# Patient Record
Sex: FEMALE | Race: WHITE | NOT HISPANIC OR LATINO | Employment: FULL TIME | URBAN - METROPOLITAN AREA
[De-identification: names, ages, dates, MRNs, and addresses within clinical notes are randomized per-mention and may not be internally consistent; named-entity substitution may affect disease eponyms.]

---

## 2019-08-08 ENCOUNTER — OFFICE VISIT (OUTPATIENT)
Dept: FAMILY MEDICINE | Facility: CLINIC | Age: 59
End: 2019-08-08
Payer: COMMERCIAL

## 2019-08-08 VITALS
DIASTOLIC BLOOD PRESSURE: 78 MMHG | TEMPERATURE: 98 F | WEIGHT: 211.63 LBS | BODY MASS INDEX: 35.26 KG/M2 | HEIGHT: 65 IN | SYSTOLIC BLOOD PRESSURE: 126 MMHG | HEART RATE: 81 BPM | OXYGEN SATURATION: 96 %

## 2019-08-08 DIAGNOSIS — Z86.19 HISTORY OF HEPATITIS C: ICD-10-CM

## 2019-08-08 DIAGNOSIS — K57.92 DIVERTICULITIS: Primary | ICD-10-CM

## 2019-08-08 PROCEDURE — 99999 PR PBB SHADOW E&M-NEW PATIENT-LVL III: ICD-10-PCS | Mod: PBBFAC,,, | Performed by: FAMILY MEDICINE

## 2019-08-08 PROCEDURE — 99204 OFFICE O/P NEW MOD 45 MIN: CPT | Mod: S$GLB,,, | Performed by: FAMILY MEDICINE

## 2019-08-08 PROCEDURE — 99204 PR OFFICE/OUTPT VISIT, NEW, LEVL IV, 45-59 MIN: ICD-10-PCS | Mod: S$GLB,,, | Performed by: FAMILY MEDICINE

## 2019-08-08 PROCEDURE — 99999 PR PBB SHADOW E&M-NEW PATIENT-LVL III: CPT | Mod: PBBFAC,,, | Performed by: FAMILY MEDICINE

## 2019-08-08 RX ORDER — METRONIDAZOLE 500 MG/1
500 TABLET ORAL 3 TIMES DAILY
Qty: 21 TABLET | Refills: 0 | Status: SHIPPED | OUTPATIENT
Start: 2019-08-08 | End: 2020-02-03 | Stop reason: ALTCHOICE

## 2019-08-08 RX ORDER — CIPROFLOXACIN 500 MG/1
500 TABLET ORAL EVERY 12 HOURS
Qty: 14 TABLET | Refills: 0 | Status: SHIPPED | OUTPATIENT
Start: 2019-08-08 | End: 2020-02-03 | Stop reason: ALTCHOICE

## 2019-08-08 NOTE — LETTER
August 8, 2019      [facility]  [address]  [facility], [place] [address]  Phone: [phone]  Fax: [phone]       Patient: Sharri House   YOB: 1960  Date of Visit: 08/08/2019    To Whom It May Concern:    Jenifer House  was at [facility] on 08/08/2019. She may return to work on 8/12/19 with no restrictions. If you have any questions or concerns, or if I can be of further assistance, please do not hesitate to contact me.    Sincerely,    Rajani Bowens MA
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
## 2020-02-03 ENCOUNTER — OFFICE VISIT (OUTPATIENT)
Dept: FAMILY MEDICINE | Facility: CLINIC | Age: 60
End: 2020-02-03
Payer: COMMERCIAL

## 2020-02-03 ENCOUNTER — HOSPITAL ENCOUNTER (OUTPATIENT)
Facility: HOSPITAL | Age: 60
Discharge: HOME | End: 2020-02-04
Attending: EMERGENCY MEDICINE | Admitting: FAMILY MEDICINE
Payer: COMMERCIAL

## 2020-02-03 VITALS
HEIGHT: 65 IN | DIASTOLIC BLOOD PRESSURE: 86 MMHG | TEMPERATURE: 97 F | HEART RATE: 81 BPM | WEIGHT: 227.5 LBS | BODY MASS INDEX: 37.9 KG/M2 | SYSTOLIC BLOOD PRESSURE: 124 MMHG | OXYGEN SATURATION: 99 %

## 2020-02-03 DIAGNOSIS — R03.0 ELEVATED BLOOD PRESSURE READING WITHOUT DIAGNOSIS OF HYPERTENSION: ICD-10-CM

## 2020-02-03 DIAGNOSIS — Z72.0 TOBACCO ABUSE DISORDER: ICD-10-CM

## 2020-02-03 DIAGNOSIS — R07.9 CHEST PAIN WITH MODERATE RISK OF ACUTE CORONARY SYNDROME: Primary | ICD-10-CM

## 2020-02-03 DIAGNOSIS — F17.218 CIGARETTE NICOTINE DEPENDENCE WITH OTHER NICOTINE-INDUCED DISORDER: ICD-10-CM

## 2020-02-03 DIAGNOSIS — R07.9 CHEST PAIN: ICD-10-CM

## 2020-02-03 DIAGNOSIS — R07.9 CHEST PAIN, UNSPECIFIED TYPE: Primary | ICD-10-CM

## 2020-02-03 LAB
ALBUMIN SERPL BCP-MCNC: 3.8 G/DL (ref 3.5–5.2)
ALP SERPL-CCNC: 68 U/L (ref 55–135)
ALT SERPL W/O P-5'-P-CCNC: 28 U/L (ref 10–44)
ANION GAP SERPL CALC-SCNC: 12 MMOL/L (ref 8–16)
AST SERPL-CCNC: 23 U/L (ref 10–40)
BASOPHILS # BLD AUTO: 0.05 K/UL (ref 0–0.2)
BASOPHILS NFR BLD: 1 % (ref 0–1.9)
BILIRUB SERPL-MCNC: 0.5 MG/DL (ref 0.1–1)
BILIRUB UR QL STRIP: NEGATIVE
BNP SERPL-MCNC: 14 PG/ML (ref 0–99)
BUN SERPL-MCNC: 14 MG/DL (ref 6–20)
CALCIUM SERPL-MCNC: 9.5 MG/DL (ref 8.7–10.5)
CHLORIDE SERPL-SCNC: 104 MMOL/L (ref 95–110)
CK SERPL-CCNC: 112 U/L (ref 20–180)
CLARITY UR: CLEAR
CO2 SERPL-SCNC: 22 MMOL/L (ref 23–29)
COLOR UR: YELLOW
CREAT SERPL-MCNC: 0.8 MG/DL (ref 0.5–1.4)
DIFFERENTIAL METHOD: NORMAL
EOSINOPHIL # BLD AUTO: 0.2 K/UL (ref 0–0.5)
EOSINOPHIL NFR BLD: 3.1 % (ref 0–8)
ERYTHROCYTE [DISTWIDTH] IN BLOOD BY AUTOMATED COUNT: 12.4 % (ref 11.5–14.5)
EST. GFR  (AFRICAN AMERICAN): >60 ML/MIN/1.73 M^2
EST. GFR  (NON AFRICAN AMERICAN): >60 ML/MIN/1.73 M^2
GLUCOSE SERPL-MCNC: 97 MG/DL (ref 70–110)
GLUCOSE UR QL STRIP: NEGATIVE
HCT VFR BLD AUTO: 42.1 % (ref 37–48.5)
HGB BLD-MCNC: 14 G/DL (ref 12–16)
HGB UR QL STRIP: ABNORMAL
IMM GRANULOCYTES # BLD AUTO: 0.01 K/UL (ref 0–0.04)
IMM GRANULOCYTES NFR BLD AUTO: 0.2 % (ref 0–0.5)
KETONES UR QL STRIP: ABNORMAL
LEUKOCYTE ESTERASE UR QL STRIP: NEGATIVE
LYMPHOCYTES # BLD AUTO: 1.7 K/UL (ref 1–4.8)
LYMPHOCYTES NFR BLD: 33.9 % (ref 18–48)
MCH RBC QN AUTO: 28.4 PG (ref 27–31)
MCHC RBC AUTO-ENTMCNC: 33.3 G/DL (ref 32–36)
MCV RBC AUTO: 85 FL (ref 82–98)
MONOCYTES # BLD AUTO: 0.5 K/UL (ref 0.3–1)
MONOCYTES NFR BLD: 10.8 % (ref 4–15)
NEUTROPHILS # BLD AUTO: 2.5 K/UL (ref 1.8–7.7)
NEUTROPHILS NFR BLD: 51 % (ref 38–73)
NITRITE UR QL STRIP: NEGATIVE
NRBC BLD-RTO: 0 /100 WBC
PH UR STRIP: 5 [PH] (ref 5–8)
PLATELET # BLD AUTO: 252 K/UL (ref 150–350)
PMV BLD AUTO: 10.2 FL (ref 9.2–12.9)
POTASSIUM SERPL-SCNC: 4.1 MMOL/L (ref 3.5–5.1)
PROT SERPL-MCNC: 7.2 G/DL (ref 6–8.4)
PROT UR QL STRIP: NEGATIVE
RBC # BLD AUTO: 4.93 M/UL (ref 4–5.4)
SODIUM SERPL-SCNC: 138 MMOL/L (ref 136–145)
SP GR UR STRIP: >=1.03 (ref 1–1.03)
TROPONIN I SERPL DL<=0.01 NG/ML-MCNC: <0.006 NG/ML (ref 0–0.03)
URN SPEC COLLECT METH UR: ABNORMAL
UROBILINOGEN UR STRIP-ACNC: NEGATIVE EU/DL
WBC # BLD AUTO: 4.89 K/UL (ref 3.9–12.7)

## 2020-02-03 PROCEDURE — 99285 EMERGENCY DEPT VISIT HI MDM: CPT | Mod: 25

## 2020-02-03 PROCEDURE — 25000003 PHARM REV CODE 250: Performed by: EMERGENCY MEDICINE

## 2020-02-03 PROCEDURE — 84484 ASSAY OF TROPONIN QUANT: CPT

## 2020-02-03 PROCEDURE — 83880 ASSAY OF NATRIURETIC PEPTIDE: CPT

## 2020-02-03 PROCEDURE — 36415 COLL VENOUS BLD VENIPUNCTURE: CPT

## 2020-02-03 PROCEDURE — 99214 PR OFFICE/OUTPT VISIT, EST, LEVL IV, 30-39 MIN: ICD-10-PCS | Mod: S$GLB,,, | Performed by: FAMILY MEDICINE

## 2020-02-03 PROCEDURE — 99214 OFFICE O/P EST MOD 30 MIN: CPT | Mod: S$GLB,,, | Performed by: FAMILY MEDICINE

## 2020-02-03 PROCEDURE — 80053 COMPREHEN METABOLIC PANEL: CPT

## 2020-02-03 PROCEDURE — G0378 HOSPITAL OBSERVATION PER HR: HCPCS

## 2020-02-03 PROCEDURE — 93005 ELECTROCARDIOGRAM TRACING: CPT

## 2020-02-03 PROCEDURE — 84484 ASSAY OF TROPONIN QUANT: CPT | Mod: 91

## 2020-02-03 PROCEDURE — 81003 URINALYSIS AUTO W/O SCOPE: CPT

## 2020-02-03 PROCEDURE — 85025 COMPLETE CBC W/AUTO DIFF WBC: CPT

## 2020-02-03 PROCEDURE — 99999 PR PBB SHADOW E&M-EST. PATIENT-LVL III: ICD-10-PCS | Mod: PBBFAC,,, | Performed by: FAMILY MEDICINE

## 2020-02-03 PROCEDURE — 82550 ASSAY OF CK (CPK): CPT

## 2020-02-03 PROCEDURE — 99999 PR PBB SHADOW E&M-EST. PATIENT-LVL III: CPT | Mod: PBBFAC,,, | Performed by: FAMILY MEDICINE

## 2020-02-03 PROCEDURE — 93010 EKG 12-LEAD: ICD-10-PCS | Mod: ,,, | Performed by: INTERNAL MEDICINE

## 2020-02-03 PROCEDURE — 93010 ELECTROCARDIOGRAM REPORT: CPT | Mod: ,,, | Performed by: INTERNAL MEDICINE

## 2020-02-03 RX ORDER — ONDANSETRON 2 MG/ML
4 INJECTION INTRAMUSCULAR; INTRAVENOUS EVERY 6 HOURS PRN
Status: DISCONTINUED | OUTPATIENT
Start: 2020-02-04 | End: 2020-02-04 | Stop reason: HOSPADM

## 2020-02-03 RX ORDER — ASPIRIN 325 MG
325 TABLET ORAL
Status: COMPLETED | OUTPATIENT
Start: 2020-02-03 | End: 2020-02-03

## 2020-02-03 RX ORDER — NITROGLYCERIN 0.4 MG/1
0.4 TABLET SUBLINGUAL EVERY 5 MIN PRN
Status: DISCONTINUED | OUTPATIENT
Start: 2020-02-04 | End: 2020-02-04 | Stop reason: HOSPADM

## 2020-02-03 RX ORDER — ACETAMINOPHEN 325 MG/1
650 TABLET ORAL EVERY 6 HOURS PRN
Status: DISCONTINUED | OUTPATIENT
Start: 2020-02-04 | End: 2020-02-04 | Stop reason: HOSPADM

## 2020-02-03 RX ORDER — ENOXAPARIN SODIUM 100 MG/ML
40 INJECTION SUBCUTANEOUS EVERY 24 HOURS
Status: DISCONTINUED | OUTPATIENT
Start: 2020-02-04 | End: 2020-02-04 | Stop reason: HOSPADM

## 2020-02-03 RX ORDER — ASPIRIN 81 MG/1
81 TABLET ORAL DAILY
Status: DISCONTINUED | OUTPATIENT
Start: 2020-02-04 | End: 2020-02-04 | Stop reason: HOSPADM

## 2020-02-03 RX ORDER — PANTOPRAZOLE SODIUM 40 MG/1
40 TABLET, DELAYED RELEASE ORAL DAILY
Status: DISCONTINUED | OUTPATIENT
Start: 2020-02-04 | End: 2020-02-04 | Stop reason: HOSPADM

## 2020-02-03 RX ORDER — TALC
6 POWDER (GRAM) TOPICAL NIGHTLY PRN
Status: DISCONTINUED | OUTPATIENT
Start: 2020-02-04 | End: 2020-02-04 | Stop reason: HOSPADM

## 2020-02-03 RX ADMIN — ASPIRIN 325 MG: 325 TABLET ORAL at 04:02

## 2020-02-03 RX ADMIN — NITROGLYCERIN 1 INCH: 20 OINTMENT TOPICAL at 04:02

## 2020-02-03 NOTE — PROGRESS NOTES
"Subjective:       Patient ID: Sharri House is a 59 y.o. female.    Chief Complaint: Chest Pain      HPI Comments:     No current facility-administered medications for this visit.   No current outpatient medications on file.    Facility-Administered Medications Ordered in Other Visits:     aspirin tablet 325 mg, 325 mg, Oral, ED 1 Time, Robbie Mcdermott MD    nitroGLYCERIN 2% TD oint ointment 1 inch, 1 inch, Topical (Top), ED 1 Time, Robbie Mcdermott MD      Was talking on the phone about 90 min ago when she began having substernal chest pressure.  Radiate up into both jaws and ears.  Currently she is having some discomfort in her chest, can't focus.  No nausea or the vomiting.  Slight shortness of breath with no diaphoresis.  EMS was called at work and informed her that her vital signs and blood sugar were stable.  EKG was normal.  Recommended she get further evaluated with enzymes.  No history of cardiac problems.  Has had some palpitations over the last month but no increase in stress levels.  History of more heartburn now than before.  History of hiatal hernia.  Takes Tums and Nexium    Review of Systems   Constitutional: Positive for fatigue. Negative for activity change, appetite change and fever.   HENT: Negative for sore throat.    Respiratory: Positive for chest tightness and shortness of breath. Negative for cough.    Cardiovascular: Negative for chest pain.   Gastrointestinal: Negative for abdominal pain, diarrhea and nausea.   Genitourinary: Negative for difficulty urinating.   Musculoskeletal: Negative for arthralgias and myalgias.   Neurological: Negative for dizziness and headaches.   Psychiatric/Behavioral: Positive for decreased concentration.       Objective:      Vitals:    02/03/20 1507   BP: 124/86   Pulse: 81   Temp: 97 °F (36.1 °C)   TempSrc: Tympanic   SpO2: 99%   Weight: 103.2 kg (227 lb 8.2 oz)   Height: 5' 5" (1.651 m)   PainSc:   8   PainLoc: Chest     Physical Exam "   Constitutional: She is oriented to person, place, and time. She appears well-developed and well-nourished. No distress.   HENT:   Head: Normocephalic.   Mouth/Throat: No oropharyngeal exudate.   Neck: Neck supple. No thyromegaly present.   Cardiovascular: Normal rate, regular rhythm and normal heart sounds.   No murmur heard.  Pulmonary/Chest: Effort normal and breath sounds normal. She has no wheezes. She has no rales.   Abdominal: Soft. She exhibits no distension.   Musculoskeletal: She exhibits no edema.   Lymphadenopathy:     She has no cervical adenopathy.   Neurological: She is alert and oriented to person, place, and time.   Skin: Skin is warm and dry. She is not diaphoretic.   Psychiatric: Her speech is normal and behavior is normal. Judgment and thought content normal. Her mood appears anxious.   Nursing note and vitals reviewed.      Assessment:       1. Chest pain, unspecified type        Plan:   Chest pain, unspecified type  Comments:  Angina verses GERD/esophageal spasm.  Sent to ER for further evaluation.   will drive patient directly

## 2020-02-03 NOTE — ED PROVIDER NOTES
SCRIBE #1 NOTE: I, Rm Trell, am scribing for, and in the presence of, Robbie Mcdermott MD and Bonny Hu MD. I have scribed the entire note.       History     Chief Complaint   Patient presents with    Chest Pain     sent by Dr. Brandt for sub-sternal CP radiating to jaw x 2 hours; denies SOB     Review of patient's allergies indicates:  No Known Allergies      History of Present Illness     HPI    2/3/2020, 3:59 PM  History obtained from the patient      History of Present Illness: Sharri House is a 59 y.o. female patient with a history of tobacco abuse who presents to the Emergency Department for evaluation of left sided CP radiating to the jaw which onset 2 hours ago. Symptoms are constant and moderate in severity. No mitigating or exacerbating factors reported. Associated sxs include diaphoresis and nausea. Patient denies any SOB, cough, palpitations, emesis, weakness, and all other sxs at this time. No prior Tx reported. No further complaints or concerns at this time. Patient referred to ED by PCP for further eval.      Arrival mode: Personal transportation    PCP: Ar Brandt MD      Past Medical History:  Past Medical History:   Diagnosis Date    GERD (gastroesophageal reflux disease)        Past Surgical History:  Past Surgical History:   Procedure Laterality Date    ABDOMINAL SURGERY       SECTION      HIP SURGERY      HYSTERECTOMY           Family History:  History reviewed. No pertinent family history.    Social History:   Social History     Tobacco Use    Smoking status: Current Every Day Smoker     Packs/day: 0.25     Types: Cigarettes    Smokeless tobacco: Never Used   Substance and Sexual Activity    Alcohol use: Yes     Comment: occasional     Drug use: Never    Sexual activity: Unknown         Review of Systems     Review of Systems   Constitutional: Positive for diaphoresis. Negative for fever.   HENT: Negative for sore throat.    Respiratory:  "Negative for cough and shortness of breath.    Cardiovascular: Positive for chest pain. Negative for palpitations.   Gastrointestinal: Positive for nausea. Negative for vomiting.   Genitourinary: Negative for dysuria.   Musculoskeletal: Negative for back pain.   Skin: Negative for rash.   Neurological: Negative for dizziness and weakness.   Hematological: Does not bruise/bleed easily.   All other systems reviewed and are negative.       Physical Exam     Initial Vitals [02/03/20 1541]   BP Pulse Resp Temp SpO2   (!) 178/84 75 20 98.1 °F (36.7 °C) 98 %      MAP       --          Physical Exam  Nursing Notes and Vital Signs Reviewed.  Constitutional: Well-developed and well-nourished. NAD  Head: Atraumatic. Normocephalic.  Eyes: PERRL. EOM intact. Conjunctivae are not pale. No scleral icterus.  ENT: Mucous membranes are moist. Oropharynx is clear and symmetric.    Neck: Supple. Full ROM. No lymphadenopathy.  Cardiovascular: Regular rate. Regular rhythm. No murmurs, rubs, or gallops. Distal pulses are 2+ and symmetric.  Pulmonary/Chest: No respiratory distress. Clear to auscultation bilaterally. No wheezing or rales.  Abdominal: Soft and non-distended.  There is no tenderness.  No rebound, guarding, or rigidity. Good bowel sounds.  Genitourinary: No CVA tenderness  Musculoskeletal: Moves all extremities. No obvious deformities. No calf tenderness.  Skin: Warm and dry.  Neurological:  Alert, awake, and appropriate.  Normal speech.  No acute focal neurological deficits are appreciated.  Psychiatric: Normal affect. Good eye contact. Appropriate in content.     ED Course   Procedures  ED Vital Signs:  Vitals:    02/03/20 1541 02/03/20 1554 02/03/20 1600 02/03/20 1615   BP: (!) 178/84  (!) 163/78 (!) 149/71   Pulse: 75 69 70 70   Resp: 20  14 20   Temp: 98.1 °F (36.7 °C)      TempSrc: Oral      SpO2: 98%  98% 96%   Weight: 103.1 kg (227 lb 6.5 oz)      Height: 5' 5" (1.651 m)       02/03/20 1630 02/03/20 1645   BP: (!) " 156/83 (!) 157/88   Pulse: 67 70   Resp: 20 19   Temp:     TempSrc:     SpO2: 98% 96%   Weight:     Height:         Abnormal Lab Results:  Labs Reviewed   COMPREHENSIVE METABOLIC PANEL - Abnormal; Notable for the following components:       Result Value    CO2 22 (*)     All other components within normal limits   CBC W/ AUTO DIFFERENTIAL   B-TYPE NATRIURETIC PEPTIDE   CK   TROPONIN I   URINALYSIS, REFLEX TO URINE CULTURE        All Lab Results:  Results for orders placed or performed during the hospital encounter of 02/03/20   CBC auto differential   Result Value Ref Range    WBC 4.89 3.90 - 12.70 K/uL    RBC 4.93 4.00 - 5.40 M/uL    Hemoglobin 14.0 12.0 - 16.0 g/dL    Hematocrit 42.1 37.0 - 48.5 %    Mean Corpuscular Volume 85 82 - 98 fL    Mean Corpuscular Hemoglobin 28.4 27.0 - 31.0 pg    Mean Corpuscular Hemoglobin Conc 33.3 32.0 - 36.0 g/dL    RDW 12.4 11.5 - 14.5 %    Platelets 252 150 - 350 K/uL    MPV 10.2 9.2 - 12.9 fL    Immature Granulocytes 0.2 0.0 - 0.5 %    Gran # (ANC) 2.5 1.8 - 7.7 K/uL    Immature Grans (Abs) 0.01 0.00 - 0.04 K/uL    Lymph # 1.7 1.0 - 4.8 K/uL    Mono # 0.5 0.3 - 1.0 K/uL    Eos # 0.2 0.0 - 0.5 K/uL    Baso # 0.05 0.00 - 0.20 K/uL    nRBC 0 0 /100 WBC    Gran% 51.0 38.0 - 73.0 %    Lymph% 33.9 18.0 - 48.0 %    Mono% 10.8 4.0 - 15.0 %    Eosinophil% 3.1 0.0 - 8.0 %    Basophil% 1.0 0.0 - 1.9 %    Differential Method Automated    Comprehensive metabolic panel   Result Value Ref Range    Sodium 138 136 - 145 mmol/L    Potassium 4.1 3.5 - 5.1 mmol/L    Chloride 104 95 - 110 mmol/L    CO2 22 (L) 23 - 29 mmol/L    Glucose 97 70 - 110 mg/dL    BUN, Bld 14 6 - 20 mg/dL    Creatinine 0.8 0.5 - 1.4 mg/dL    Calcium 9.5 8.7 - 10.5 mg/dL    Total Protein 7.2 6.0 - 8.4 g/dL    Albumin 3.8 3.5 - 5.2 g/dL    Total Bilirubin 0.5 0.1 - 1.0 mg/dL    Alkaline Phosphatase 68 55 - 135 U/L    AST 23 10 - 40 U/L    ALT 28 10 - 44 U/L    Anion Gap 12 8 - 16 mmol/L    eGFR if African American >60 >60  mL/min/1.73 m^2    eGFR if non African American >60 >60 mL/min/1.73 m^2   Brain natriuretic peptide   Result Value Ref Range    BNP 14 0 - 99 pg/mL   CK   Result Value Ref Range     20 - 180 U/L   Troponin I   Result Value Ref Range    Troponin I <0.006 0.000 - 0.026 ng/mL         Imaging Results          X-Ray Chest PA And Lateral (Final result)  Result time 02/03/20 16:30:51    Final result by YI Dunham Sr., MD (02/03/20 16:30:51)                 Impression:      Normal study.      Electronically signed by: Bruno Dunham MD  Date:    02/03/2020  Time:    16:30             Narrative:    EXAMINATION:  XR CHEST PA AND LATERAL    CLINICAL HISTORY:  chest pain;    COMPARISON:  10/15/2009    FINDINGS:  The size and contour of the heart are normal. The lungs are clear. There is no pneumothorax or pleural effusion.                                 The EKG was ordered, reviewed, and independently interpreted by the ED provider.  Interpretation time: 1556  Rate: 68 BPM  Rhythm: NSR  Interpretation: no acute ST changes. No STEMI.      The Emergency Provider reviewed the vital signs and test results, which are outlined above.     ED Discussion     4:08 PM: Dr. Mcdermott transfers care of pt to Dr. Hu pending lab and imaging results.    5:21 PM: Discussed case with  (Hospital Medicine). Dr. Altamirano agrees with current care and management of pt and accepts admission for ACS rule out.   Admitting Service: Hospital Medicine  Admit to: obs / tele    Re-evaluated pt. I have discussed test results, shared treatment plan, and the need for admission with patient and family at bedside. Pt and family express understanding at this time and agree with all information. All questions answered. Pt and family have no further questions or concerns at this time. Pt is ready for admit.       MDM        Medical Decision Making:   Clinical Tests:   Lab Tests: Ordered and Reviewed  Radiological Study: Reviewed and Ordered  Medical  Tests: Reviewed and Ordered     Additional MDM:   Smoking Cessation: The patient is a smoker. The patient was counseled on smoking cessation for: 4 minutes. The patient was counseled on tobacco related  health complications.        ED Medication(s):  Medications   nitroGLYCERIN 2% TD oint ointment 1 inch (1 inch Topical (Top) Given 2/3/20 1632)   aspirin tablet 325 mg (325 mg Oral Given 2/3/20 1632)       New Prescriptions    No medications on file               Scribe Attestation:   Scribe #1: I performed the above scribed service and the documentation accurately describes the services I performed. I attest to the accuracy of the note.     Attending:   Physician Attestation Statement for Scribe #1: I, Robbie Mcdermott MD, personally performed the services described in this documentation, as scribed by Rm Cai, in my presence, and it is both accurate and complete.     Physician Attestation Statement for Scribe #1: I, Bonny Hu MD, personally performed the services described in this documentation, as scribed by Rm Cai, in my presence, and it is both accurate and complete.           Clinical Impression       ICD-10-CM ICD-9-CM   1. Chest pain with moderate risk of acute coronary syndrome R07.9 786.50   2. Chest pain R07.9 786.50   3. Tobacco abuse disorder Z72.0 305.1       Disposition:   Disposition: Placed in Observation  Condition: Fair         Bonny Hu MD  02/03/20 7301

## 2020-02-04 VITALS
WEIGHT: 226.19 LBS | DIASTOLIC BLOOD PRESSURE: 65 MMHG | HEART RATE: 70 BPM | RESPIRATION RATE: 18 BRPM | SYSTOLIC BLOOD PRESSURE: 129 MMHG | BODY MASS INDEX: 37.69 KG/M2 | HEIGHT: 65 IN | TEMPERATURE: 98 F | OXYGEN SATURATION: 94 %

## 2020-02-04 DIAGNOSIS — R07.9 CHEST PAIN, UNSPECIFIED TYPE: Primary | ICD-10-CM

## 2020-02-04 DIAGNOSIS — E66.01 SEVERE OBESITY (BMI 35.0-35.9 WITH COMORBIDITY): ICD-10-CM

## 2020-02-04 DIAGNOSIS — Z72.0 TOBACCO USE: ICD-10-CM

## 2020-02-04 PROBLEM — J44.9 COPD (CHRONIC OBSTRUCTIVE PULMONARY DISEASE): Status: ACTIVE | Noted: 2020-02-04

## 2020-02-04 PROBLEM — F17.210 DEPENDENCE ON NICOTINE FROM CIGARETTES: Status: ACTIVE | Noted: 2020-02-03

## 2020-02-04 PROBLEM — R03.0 ELEVATED BLOOD PRESSURE READING WITHOUT DIAGNOSIS OF HYPERTENSION: Status: RESOLVED | Noted: 2020-02-03 | Resolved: 2020-02-04

## 2020-02-04 LAB
CHOLEST SERPL-MCNC: 206 MG/DL (ref 120–199)
CHOLEST/HDLC SERPL: 4.5 {RATIO} (ref 2–5)
DIASTOLIC DYSFUNCTION: NO
ESTIMATED PA SYSTOLIC PRESSURE: 22.36
HDLC SERPL-MCNC: 46 MG/DL (ref 40–75)
HDLC SERPL: 22.3 % (ref 20–50)
LDLC SERPL CALC-MCNC: 117.2 MG/DL (ref 63–159)
NONHDLC SERPL-MCNC: 160 MG/DL
RETIRED EF AND QEF - SEE NOTES: 60 (ref 55–65)
TRIGL SERPL-MCNC: 214 MG/DL (ref 30–150)
TROPONIN I SERPL DL<=0.01 NG/ML-MCNC: <0.006 NG/ML (ref 0–0.03)
TROPONIN I SERPL DL<=0.01 NG/ML-MCNC: <0.006 NG/ML (ref 0–0.03)

## 2020-02-04 PROCEDURE — 93306 2D ECHO WITH COLOR FLOW DOPPLER: ICD-10-PCS | Mod: 26,,, | Performed by: INTERNAL MEDICINE

## 2020-02-04 PROCEDURE — 99244 PR OFFICE CONSULTATION,LEVEL IV: ICD-10-PCS | Mod: 25,,, | Performed by: INTERNAL MEDICINE

## 2020-02-04 PROCEDURE — 93306 TTE W/DOPPLER COMPLETE: CPT

## 2020-02-04 PROCEDURE — 99244 OFF/OP CNSLTJ NEW/EST MOD 40: CPT | Mod: 25,,, | Performed by: INTERNAL MEDICINE

## 2020-02-04 PROCEDURE — G0378 HOSPITAL OBSERVATION PER HR: HCPCS

## 2020-02-04 PROCEDURE — 84484 ASSAY OF TROPONIN QUANT: CPT

## 2020-02-04 PROCEDURE — 80061 LIPID PANEL: CPT

## 2020-02-04 PROCEDURE — 25000003 PHARM REV CODE 250: Performed by: NURSE PRACTITIONER

## 2020-02-04 PROCEDURE — 93306 TTE W/DOPPLER COMPLETE: CPT | Mod: 26,,, | Performed by: INTERNAL MEDICINE

## 2020-02-04 RX ORDER — PANTOPRAZOLE SODIUM 40 MG/1
40 TABLET, DELAYED RELEASE ORAL DAILY
Qty: 14 TABLET | Refills: 0 | Status: SHIPPED | OUTPATIENT
Start: 2020-02-05 | End: 2020-05-28 | Stop reason: ALTCHOICE

## 2020-02-04 RX ORDER — ACETAMINOPHEN 325 MG/1
650 TABLET ORAL EVERY 6 HOURS PRN
Refills: 0
Start: 2020-02-04 | End: 2020-05-28 | Stop reason: ALTCHOICE

## 2020-02-04 RX ORDER — REGADENOSON 0.08 MG/ML
0.4 INJECTION, SOLUTION INTRAVENOUS ONCE
Status: DISCONTINUED | OUTPATIENT
Start: 2020-02-04 | End: 2020-02-04 | Stop reason: HOSPADM

## 2020-02-04 RX ADMIN — ASPIRIN 81 MG: 81 TABLET, COATED ORAL at 09:02

## 2020-02-04 RX ADMIN — ACETAMINOPHEN 650 MG: 325 TABLET ORAL at 07:02

## 2020-02-04 RX ADMIN — PANTOPRAZOLE SODIUM 40 MG: 40 TABLET, DELAYED RELEASE ORAL at 09:02

## 2020-02-04 NOTE — SUBJECTIVE & OBJECTIVE
Past Medical History:   Diagnosis Date    GERD (gastroesophageal reflux disease)        Past Surgical History:   Procedure Laterality Date    ABDOMINAL SURGERY       SECTION      HIP SURGERY      HYSTERECTOMY         Review of patient's allergies indicates:  No Known Allergies    No current facility-administered medications on file prior to encounter.      Current Outpatient Medications on File Prior to Encounter   Medication Sig    [DISCONTINUED] ciprofloxacin HCl (CIPRO) 500 MG tablet Take 1 tablet (500 mg total) by mouth every 12 (twelve) hours.    [DISCONTINUED] metroNIDAZOLE (FLAGYL) 500 MG tablet Take 1 tablet (500 mg total) by mouth 3 (three) times daily.     Family History     Reviewed and not pertinent.         Tobacco Use    Smoking status: Current Every Day Smoker     Packs/day: 0.25     Types: Cigarettes    Smokeless tobacco: Never Used   Substance and Sexual Activity    Alcohol use: Yes     Comment: occasional     Drug use: Never    Sexual activity: Not on file     Review of Systems   Constitutional: Negative for chills, diaphoresis, fatigue and fever.   HENT: Negative for congestion, postnasal drip, rhinorrhea, sinus pressure and sore throat.    Respiratory: Positive for shortness of breath. Negative for cough and wheezing.    Cardiovascular: Positive for chest pain. Negative for palpitations and leg swelling.   Gastrointestinal: Negative for abdominal pain, diarrhea, nausea and vomiting.   Genitourinary: Negative for urgency.   Musculoskeletal: Positive for neck pain. Negative for arthralgias, back pain and myalgias.   Skin: Negative for pallor and rash.   Neurological: Negative for dizziness, syncope, weakness, light-headedness, numbness and headaches.   Psychiatric/Behavioral: The patient is not nervous/anxious.    All other systems reviewed and are negative.    Objective:     Vital Signs (Most Recent):  Temp: 97.8 °F (36.6 °C)   Pulse: 72   Resp: 18   BP: 133/71   SpO2: 96 %   Vital Signs (24h Range):  Temp:  [97 °F (36.1 °C)-98.1 °F (36.7 °C)] 97.8 °F (36.6 °C)  Pulse:  [67-81] 72  Resp:  [14-21] 18  SpO2:  [94 %-99 %] 96 %  BP: (124-178)/(63-89) 133/71     Weight: 103.1 kg (227 lb 6.5 oz)  Body mass index is 37.84 kg/m².    Physical Exam   Constitutional: She is oriented to person, place, and time. She appears well-developed and well-nourished. No distress.   HENT:   Head: Normocephalic and atraumatic.   Eyes: Conjunctivae are normal.   PERRL; EOM intact.   Neck: Normal range of motion. Neck supple.   Cardiovascular: Normal rate, regular rhythm, S1 normal, S2 normal and intact distal pulses.  No extrasystoles are present. Exam reveals no gallop and no friction rub.   No murmur heard.  Pulses:       Radial pulses are 2+ on the right side, and 2+ on the left side.        Dorsalis pedis pulses are 2+ on the right side, and 2+ on the left side.        Posterior tibial pulses are 2+ on the right side, and 2+ on the left side.   Pulmonary/Chest: Effort normal and breath sounds normal. No accessory muscle usage. No tachypnea. No respiratory distress. She has no wheezes. She has no rhonchi. She has no rales.   Abdominal: Soft. Bowel sounds are normal. She exhibits no distension. There is no tenderness. There is no rebound, no guarding and no CVA tenderness.   Musculoskeletal: Normal range of motion. She exhibits no edema, tenderness or deformity.   Neurological: She is alert and oriented to person, place, and time. No cranial nerve deficit or sensory deficit.   Skin: Skin is warm, dry and intact. Capillary refill takes less than 2 seconds. No rash noted. She is not diaphoretic. No cyanosis or erythema.   Psychiatric: She has a normal mood and affect. Her speech is normal and behavior is normal. Cognition and memory are normal.   Nursing note and vitals reviewed.          Significant Labs:  Results for orders placed or performed during the hospital encounter of 02/03/20   CBC auto differential    Result Value Ref Range    WBC 4.89 3.90 - 12.70 K/uL    RBC 4.93 4.00 - 5.40 M/uL    Hemoglobin 14.0 12.0 - 16.0 g/dL    Hematocrit 42.1 37.0 - 48.5 %    Mean Corpuscular Volume 85 82 - 98 fL    Mean Corpuscular Hemoglobin 28.4 27.0 - 31.0 pg    Mean Corpuscular Hemoglobin Conc 33.3 32.0 - 36.0 g/dL    RDW 12.4 11.5 - 14.5 %    Platelets 252 150 - 350 K/uL    MPV 10.2 9.2 - 12.9 fL    Immature Granulocytes 0.2 0.0 - 0.5 %    Gran # (ANC) 2.5 1.8 - 7.7 K/uL    Immature Grans (Abs) 0.01 0.00 - 0.04 K/uL    Lymph # 1.7 1.0 - 4.8 K/uL    Mono # 0.5 0.3 - 1.0 K/uL    Eos # 0.2 0.0 - 0.5 K/uL    Baso # 0.05 0.00 - 0.20 K/uL    nRBC 0 0 /100 WBC    Gran% 51.0 38.0 - 73.0 %    Lymph% 33.9 18.0 - 48.0 %    Mono% 10.8 4.0 - 15.0 %    Eosinophil% 3.1 0.0 - 8.0 %    Basophil% 1.0 0.0 - 1.9 %    Differential Method Automated    Comprehensive metabolic panel   Result Value Ref Range    Sodium 138 136 - 145 mmol/L    Potassium 4.1 3.5 - 5.1 mmol/L    Chloride 104 95 - 110 mmol/L    CO2 22 (L) 23 - 29 mmol/L    Glucose 97 70 - 110 mg/dL    BUN, Bld 14 6 - 20 mg/dL    Creatinine 0.8 0.5 - 1.4 mg/dL    Calcium 9.5 8.7 - 10.5 mg/dL    Total Protein 7.2 6.0 - 8.4 g/dL    Albumin 3.8 3.5 - 5.2 g/dL    Total Bilirubin 0.5 0.1 - 1.0 mg/dL    Alkaline Phosphatase 68 55 - 135 U/L    AST 23 10 - 40 U/L    ALT 28 10 - 44 U/L    Anion Gap 12 8 - 16 mmol/L    eGFR if African American >60 >60 mL/min/1.73 m^2    eGFR if non African American >60 >60 mL/min/1.73 m^2   Urinalysis, Reflex to Urine Culture Urine, Clean Catch   Result Value Ref Range    Specimen UA Urine, Clean Catch     Color, UA Yellow Yellow, Straw, Ebony    Appearance, UA Clear Clear    pH, UA 5.0 5.0 - 8.0    Specific Gravity, UA >=1.030 (A) 1.005 - 1.030    Protein, UA Negative Negative    Glucose, UA Negative Negative    Ketones, UA Trace (A) Negative    Bilirubin (UA) Negative Negative    Occult Blood UA Trace (A) Negative    Nitrite, UA Negative Negative    Urobilinogen, UA  Negative <2.0 EU/dL    Leukocytes, UA Negative Negative   Brain natriuretic peptide   Result Value Ref Range    BNP 14 0 - 99 pg/mL   CK   Result Value Ref Range     20 - 180 U/L   Troponin I   Result Value Ref Range    Troponin I <0.006 0.000 - 0.026 ng/mL      All pertinent labs within the past 24 hours have been reviewed.    Significant Imaging:  Imaging Results          X-Ray Chest PA And Lateral (Final result)  Result time 02/03/20 16:30:51    Final result by YI Dunham Sr., MD (02/03/20 16:30:51)                 Impression:      Normal study.      Electronically signed by: Bruno Dunham MD  Date:    02/03/2020  Time:    16:30             Narrative:    EXAMINATION:  XR CHEST PA AND LATERAL    CLINICAL HISTORY:  chest pain;    COMPARISON:  10/15/2009    FINDINGS:  The size and contour of the heart are normal. The lungs are clear. There is no pneumothorax or pleural effusion.                               I have reviewed all pertinent imaging results/findings within the past 24 hours.     EKG: (personally reviewed)  Normal sinus rhythm, no acute ischemic ST-T abnormality.  No previous to compare.

## 2020-02-04 NOTE — DISCHARGE SUMMARY
[facility] Hospital Medicine  Discharge Summary      Patient Name: Sharri House  MRN: [id number]  Admission Date: 2/3/2020  Hospital Length of Stay: 0 days  Discharge Date and Time:  02/04/2020 12:38 PM  Attending Physician: Abner Altamirano MD   Discharging Provider: HOLLAND Greene  Primary Care Provider: Ar Brandt MD      HPI:   This is a 59 y.o. female  with a PMHx significant only for GERD and tobacco use, who presented to the ED with complaints of substernal chest pain that started suddenly approximately 2 hr PTA.  Pain was pressure-like in nature, moderate in intensity, radiated into bilateral jaw and ears, and nonexertional.  No aggravating factors.  Pain was relieved by 325 mg ASA and 1 in nitro paste given PTA.  Associated mild shortness of breath.  Denies any diaphoresis, nausea, vomiting, palpitations, cough, rhinorrhea, sore throat, congestion, abdominal pain, back pain, headache, lightheadedness or dizziness, syncope, weakness, fever or chills.  Initial workup in the ED was unremarkable with negative troponin x 1 and unrevealing EKG.  Patient was placed in the hospital for further evaluation treatment.    * No surgery found *      Hospital Course:   The patient was monitored closely during her stay.  She was kept on continuous telemetry monitoring where she remained in sinus rhythm no significant arrhythmias.  Cardiology was consulted.  Her troponins were trended and remained negative. Patient reported long history of cigarette smoking.  Patient with O2 sats on room air 93-94%.  Patient suspected with early signs of COPD.  Patient educated on the importance of smoking cessation.  The patient underwent echocardiogram.  Patient was also initially scheduled for a Lexiscan stress test however patient was found to have had a cup of non decaffeinated coffee prior to test.  Her overall condition remained stable with no further complaints of chest pain.  Patient was cleared for  discharge and to follow up with outpatient stress test to be ordered by Cardiology.     Consults:   Consults (From admission, onward)        Status Ordering Provider     Inpatient consult to Cardiology  Once     Provider:  Chidi Cohn MD    Completed SOLOMON ALCAZAR          Service: Hospital Medicine    Final Active Diagnoses:    Diagnosis Date Noted POA    Dependence on nicotine from cigarettes [F17.210] 02/03/2020 Yes      Problems Resolved During this Admission:    Diagnosis Date Noted Date Resolved POA    PRINCIPAL PROBLEM:  Chest pain [R07.9] 02/03/2020 02/04/2020 Yes    Elevated blood pressure reading without diagnosis of hypertension [R03.0] 02/03/2020 02/04/2020 Yes       Discharged Condition: stable    Disposition: Home or Self Care    Follow Up:  Follow-up Information     Ar Brandt MD In 2 weeks.    Specialty:  Family Medicine  Contact information:  [address] [place] [id number] [phone]             Outpatient stress test.    Why:  To be scheduled by Cardiology               Patient Instructions:      Diet Cardiac     Notify your health care provider if you experience any of the following:  severe uncontrolled pain     Notify your health care provider if you experience any of the following:  persistent nausea and vomiting or diarrhea     Notify your health care provider if you experience any of the following:  temperature >100.4     Notify your health care provider if you experience any of the following:  difficulty breathing or increased cough     Notify your health care provider if you experience any of the following:   Order Comments: Any decline in condition     Activity as tolerated       Significant Diagnostic Studies:     BNP 14        Recent Labs   Lab 02/04/20  0438   TROPONINI <0.006       Urinalysis, Reflex to Urine Culture Urine, Clean Catch   Status:  Final result    Ref Range & Units 02/03/20 1725   Specimen UA  Urine, Clean Catch    Color, UA Yellow,  Straw, Ebony Yellow    Appearance, UA Clear Clear    pH, UA 5.0 - 8.0 5.0    Specific Gravity, UA 1.005 - 1.030 >=1.030Abnormal     Protein, UA Negative Negative    Comment: Recommend a 24 hour urine protein or a urine   protein/creatinine ratio if globulin induced proteinuria is   clinically suspected.    Glucose, UA Negative Negative    Ketones, UA Negative TraceAbnormal     Bilirubin (UA) Negative Negative    Occult Blood UA Negative TraceAbnormal     Nitrite, UA Negative Negative    Urobilinogen, UA <2.0 EU/dL Negative    Leukocytes, UA Negative Negative    Resulting Agency  [facility]   Narrative     Preferred Collection Type->Urine, Clean Catch        Specimen Collected: 02/03/20 1725 Last Resulted: 02/03/20 1810           XR CHEST PA AND LATERAL-    CLINICAL HISTORY:  chest pain;    COMPARISON:  10/15/2009    FINDINGS:  The size and contour of the heart are normal. The lungs are clear. There is no pneumothorax or pleural effusion.      Impression       Normal study.      Electronically signed by: Bruno Dunham MD  Date: 02/03/2020  Time: 16:30             Last Resulted: 02/03/20 16:30        2D echocardiogram with final results currently pending at time of discharge    CMP   Recent Labs   Lab 02/03/20  1610      K 4.1      CO2 22*   GLU 97   BUN 14   CREATININE 0.8   CALCIUM 9.5   PROT 7.2   ALBUMIN 3.8   BILITOT 0.5   ALKPHOS 68   AST 23   ALT 28   ANIONGAP 12   ESTGFRAFRICA >60   EGFRNONAA >60   CBC   Recent Labs   Lab 02/03/20  1610   WBC 4.89   HGB 14.0   HCT 42.1         Pending Diagnostic Studies:     Procedure Component Value Units Date/Time    Lipid panel [[id number]] Collected:  02/04/20 0438    Order Status:  Sent Lab Status:  In process Updated:  02/04/20 1112    Specimen:  Blood       Medications:  Reconciled Home Medications:      Medication List      START taking these medications    acetaminophen 325 MG tablet  Commonly known as:  TYLENOL  Take 2 tablets (650 mg total) by mouth  every 6 (six) hours as needed.     pantoprazole 40 MG tablet  Commonly known as:  PROTONIX  Take 1 tablet (40 mg total) by mouth once daily. for 14 days  Start taking on:  February 5, 2020            Indwelling Lines/Drains at time of discharge:   Lines/Drains/Airways     None                 Time spent on the discharge of patient: 58 minutes  Patient was seen and examined on the date of discharge and determined to be suitable for discharge.         Jyoti Maurer, HOLLAND  Department of Hospital Medicine  [facility]

## 2020-02-04 NOTE — SUBJECTIVE & OBJECTIVE
Past Medical History:   Diagnosis Date    GERD (gastroesophageal reflux disease)        Past Surgical History:   Procedure Laterality Date    ABDOMINAL SURGERY       SECTION      HIP SURGERY      HYSTERECTOMY         Review of patient's allergies indicates:  No Known Allergies    No current facility-administered medications on file prior to encounter.      No current outpatient medications on file prior to encounter.     Family History     None        Tobacco Use    Smoking status: Current Every Day Smoker     Packs/day: 0.25     Types: Cigarettes    Smokeless tobacco: Never Used   Substance and Sexual Activity    Alcohol use: Yes     Comment: occasional     Drug use: Never    Sexual activity: Not on file     Review of Systems   Constitution: Positive for malaise/fatigue.   HENT: Negative for hearing loss and hoarse voice.    Eyes: Negative for blurred vision and visual disturbance.   Cardiovascular: Positive for chest pain. Negative for claudication, dyspnea on exertion, irregular heartbeat, leg swelling, near-syncope, orthopnea, palpitations, paroxysmal nocturnal dyspnea and syncope.   Respiratory: Positive for shortness of breath. Negative for cough, hemoptysis, sleep disturbances due to breathing, snoring and wheezing.    Endocrine: Negative for cold intolerance and heat intolerance.   Hematologic/Lymphatic: Does not bruise/bleed easily.   Skin: Negative for color change, dry skin and nail changes.   Musculoskeletal: Positive for arthritis. Negative for back pain, joint pain and myalgias.   Gastrointestinal: Negative for bloating, abdominal pain, constipation, nausea and vomiting.   Genitourinary: Negative for dysuria, flank pain, hematuria and hesitancy.   Neurological: Positive for dizziness. Negative for headaches, light-headedness, loss of balance, numbness, paresthesias and weakness.   Psychiatric/Behavioral: Negative for altered mental status.   Allergic/Immunologic: Negative for  environmental allergies.     Objective:     Vital Signs (Most Recent):  Temp: 97.9 °F (36.6 °C) (02/04/20 1104)  Pulse: 70 (02/04/20 1104)  Resp: 18 (02/04/20 1104)  BP: 129/65 (02/04/20 1104)  SpO2: (!) 94 % (02/04/20 1104) Vital Signs (24h Range):  Temp:  [97 °F (36.1 °C)-98.1 °F (36.7 °C)] 97.9 °F (36.6 °C)  Pulse:  [57-87] 70  Resp:  [14-21] 18  SpO2:  [93 %-99 %] 94 %  BP: (109-178)/(57-89) 129/65     Weight: 102.6 kg (226 lb 3.1 oz)  Body mass index is 37.64 kg/m².    SpO2: (!) 94 %  O2 Device (Oxygen Therapy): room air      Intake/Output Summary (Last 24 hours) at 2/4/2020 1116  Last data filed at 2/4/2020 0835  Gross per 24 hour   Intake 358 ml   Output 202 ml   Net 156 ml       Lines/Drains/Airways     Peripheral Intravenous Line                 Peripheral IV - Single Lumen 02/03/20 1610 20 G Left Antecubital less than 1 day                Physical Exam   Constitutional: She is oriented to person, place, and time. She appears well-developed and well-nourished. No distress.   HENT:   Head: Normocephalic and atraumatic.   Eyes: Pupils are equal, round, and reactive to light.   Neck: Normal range of motion and full passive range of motion without pain. Neck supple. No JVD present.   Cardiovascular: Normal rate, regular rhythm, S1 normal, S2 normal and intact distal pulses. PMI is not displaced. Exam reveals no distant heart sounds.   No murmur heard.  Pulses:       Radial pulses are 2+ on the right side, and 2+ on the left side.        Dorsalis pedis pulses are 2+ on the right side, and 2+ on the left side.   CHEST PAIN FREE   Pulmonary/Chest: Effort normal and breath sounds normal. No accessory muscle usage. No respiratory distress. She has no decreased breath sounds. She has no wheezes. She has no rales.   Abdominal: Soft. Bowel sounds are normal. She exhibits no distension. There is no tenderness.   +obese abdomen   Musculoskeletal: Normal range of motion. She exhibits no edema.        Right ankle: She  exhibits no swelling.        Left ankle: She exhibits no swelling.   Neurological: She is alert and oriented to person, place, and time.   Skin: Skin is warm. She is not diaphoretic. No cyanosis. Nails show no clubbing.   Psychiatric: She has a normal mood and affect. Her speech is normal and behavior is normal. Judgment and thought content normal. Cognition and memory are normal.   Nursing note and vitals reviewed.      Significant Labs:   BMP:   Recent Labs   Lab 02/03/20  1610   GLU 97      K 4.1      CO2 22*   BUN 14   CREATININE 0.8   CALCIUM 9.5   , CBC   Recent Labs   Lab 02/03/20  1610   WBC 4.89   HGB 14.0   HCT 42.1      , Troponin   Recent Labs   Lab 02/03/20  1610 02/03/20  2328 02/04/20  0438   TROPONINI <0.006 <0.006 <0.006    and All pertinent lab results from the last 24 hours have been reviewed.    Significant Imaging: Echocardiogram: 2D echo with color flow doppler: No results found for this or any previous visit.

## 2020-02-04 NOTE — CONSULTS
"[facility]   Cardiology  Consult Note    Patient Name: Sharri House  MRN: [id number]  Admission Date: 2/3/2020  Hospital Length of Stay: 0 days  Code Status: Full Code   Attending Provider: Abner Altamirano MD   Consulting Provider: BRIT Salas  Primary Care Physician: Ar Brandt MD  Principal Problem:Chest pain    Patient information was obtained from patient, spouse/SO, past medical records and ER records.     Inpatient consult to Cardiology  Consult performed by: BRIT Sherwood  Consult ordered by: HOLLAND Erazo        Subjective:     Chief Complaint:  Chest pain     HPI:   Sharri House is a 59 year old female who presented to [facility] from PCP office due to chest pain with associated SOB. She received ASA and NTG SL PTA with relief of chest pain. No previous medical history per patient. ED workup revealed negative troponin x 3, EKG unremarkable. She was placed in observation under the care of hospital medicine. Cardiology consulted to assist with medical management. Chart reviewed, Patient seen and examined. She endorses episode of tunnel vision, dizziness, chest pain at work yesterday after lunch and EMS was called to scene but told her everything was "fine" and she was seen at PCP office with additional episode of chest pain and SOB episode. No further chest pain since arrival to facility. Denies shortness of breath, WALTER or palpitations. No leg swelling or claudications. ECHO pending. Plan for MPI stress test today, further recs to follow.     Past Medical History:   Diagnosis Date    GERD (gastroesophageal reflux disease)        Past Surgical History:   Procedure Laterality Date    ABDOMINAL SURGERY       SECTION      HIP SURGERY      HYSTERECTOMY         Review of patient's allergies indicates:  No Known Allergies    No current facility-administered medications on file prior to encounter.      No current outpatient medications on file prior to " encounter.     Family History     None        Tobacco Use    Smoking status: Current Every Day Smoker     Packs/day: 0.25     Types: Cigarettes    Smokeless tobacco: Never Used   Substance and Sexual Activity    Alcohol use: Yes     Comment: occasional     Drug use: Never    Sexual activity: Not on file     Review of Systems   Constitution: Positive for malaise/fatigue.   HENT: Negative for hearing loss and hoarse voice.    Eyes: Negative for blurred vision and visual disturbance.   Cardiovascular: Positive for chest pain. Negative for claudication, dyspnea on exertion, irregular heartbeat, leg swelling, near-syncope, orthopnea, palpitations, paroxysmal nocturnal dyspnea and syncope.   Respiratory: Positive for shortness of breath. Negative for cough, hemoptysis, sleep disturbances due to breathing, snoring and wheezing.    Endocrine: Negative for cold intolerance and heat intolerance.   Hematologic/Lymphatic: Does not bruise/bleed easily.   Skin: Negative for color change, dry skin and nail changes.   Musculoskeletal: Positive for arthritis. Negative for back pain, joint pain and myalgias.   Gastrointestinal: Negative for bloating, abdominal pain, constipation, nausea and vomiting.   Genitourinary: Negative for dysuria, flank pain, hematuria and hesitancy.   Neurological: Positive for dizziness. Negative for headaches, light-headedness, loss of balance, numbness, paresthesias and weakness.   Psychiatric/Behavioral: Negative for altered mental status.   Allergic/Immunologic: Negative for environmental allergies.     Objective:     Vital Signs (Most Recent):  Temp: 97.9 °F (36.6 °C) (02/04/20 1104)  Pulse: 70 (02/04/20 1104)  Resp: 18 (02/04/20 1104)  BP: 129/65 (02/04/20 1104)  SpO2: (!) 94 % (02/04/20 1104) Vital Signs (24h Range):  Temp:  [97 °F (36.1 °C)-98.1 °F (36.7 °C)] 97.9 °F (36.6 °C)  Pulse:  [57-87] 70  Resp:  [14-21] 18  SpO2:  [93 %-99 %] 94 %  BP: (109-178)/(57-89) 129/65     Weight: 102.6 kg (226  lb 3.1 oz)  Body mass index is 37.64 kg/m².    SpO2: (!) 94 %  O2 Device (Oxygen Therapy): room air      Intake/Output Summary (Last 24 hours) at 2/4/2020 1116  Last data filed at 2/4/2020 0835  Gross per 24 hour   Intake 358 ml   Output 202 ml   Net 156 ml       Lines/Drains/Airways     Peripheral Intravenous Line                 Peripheral IV - Single Lumen 02/03/20 1610 20 G Left Antecubital less than 1 day                Physical Exam   Constitutional: She is oriented to person, place, and time. She appears well-developed and well-nourished. No distress.   HENT:   Head: Normocephalic and atraumatic.   Eyes: Pupils are equal, round, and reactive to light.   Neck: Normal range of motion and full passive range of motion without pain. Neck supple. No JVD present.   Cardiovascular: Normal rate, regular rhythm, S1 normal, S2 normal and intact distal pulses. PMI is not displaced. Exam reveals no distant heart sounds.   No murmur heard.  Pulses:       Radial pulses are 2+ on the right side, and 2+ on the left side.        Dorsalis pedis pulses are 2+ on the right side, and 2+ on the left side.   CHEST PAIN FREE   Pulmonary/Chest: Effort normal and breath sounds normal. No accessory muscle usage. No respiratory distress. She has no decreased breath sounds. She has no wheezes. She has no rales.   Abdominal: Soft. Bowel sounds are normal. She exhibits no distension. There is no tenderness.   +obese abdomen   Musculoskeletal: Normal range of motion. She exhibits no edema.        Right ankle: She exhibits no swelling.        Left ankle: She exhibits no swelling.   Neurological: She is alert and oriented to person, place, and time.   Skin: Skin is warm. She is not diaphoretic. No cyanosis. Nails show no clubbing.   Psychiatric: She has a normal mood and affect. Her speech is normal and behavior is normal. Judgment and thought content normal. Cognition and memory are normal.   Nursing note and vitals reviewed.      Significant  Labs:   BMP:   Recent Labs   Lab 02/03/20  1610   GLU 97      K 4.1      CO2 22*   BUN 14   CREATININE 0.8   CALCIUM 9.5   , CBC   Recent Labs   Lab 02/03/20  1610   WBC 4.89   HGB 14.0   HCT 42.1      , Troponin   Recent Labs   Lab 02/03/20  1610 02/03/20  2328 02/04/20  0438   TROPONINI <0.006 <0.006 <0.006    and All pertinent lab results from the last 24 hours have been reviewed.    Significant Imaging: Echocardiogram: 2D echo with color flow doppler: No results found for this or any previous visit.    Assessment and Plan:     * Chest pain  -troponin negative x 3  -ECHO pending  -Given +smoking history and multiple episodes yesterday, plan for MPI stress test today  -Continue ASA daily  -Given stigmata for RAFFAELE, would recommend OP sleep study    Tobacco abuse disorder  -encourage complete smoking cessation        VTE Risk Mitigation (From admission, onward)         Ordered     enoxaparin injection 40 mg  Daily      02/03/20 2304     IP VTE HIGH RISK PATIENT  Once      02/03/20 2304     Place sequential compression device  Until discontinued      02/03/20 2304                Thank you for your consult. I will follow-up with patient. Please contact us if you have any additional questions.    BRIT Salas  Cardiology   [facility]

## 2020-02-04 NOTE — PLAN OF CARE
SW met with patient and spouse at bedside to assess for discharge planning.  Patient was alert and oriented.  Patient denied the use of any medical/respiratory assistive equipment and home health services before coming to the hospital.  Patient identified her  as her help at home and stated that she manages her own healthcare.  Patient denied the need for any assistive equipment, home health services, and all other community resources at this time.  Patient anticipates discharging with no needs.  SW provided a transitional care folder, information on advanced directives, information on pharmacy bedside delivery, and discharge planning begins on admission with contact information for any needs/questions.     D/C Plan:  Home  PCP:  Dr. Ar Brandt  Preferred Pharmacy:    [facility] [address] [place] [address] [place] [address]  [address] [address] [address]  Phone: [phone] Fax: [phone]    Discharge transportation:  Home  [facility]:  Pending  Pharmacy Bedside Delivery:  Yes        02/04/20 1311   Discharge Assessment   Assessment Type Discharge Planning Assessment   Confirmed/corrected address and phone number on facesheet? Yes   Assessment information obtained from? Patient   Expected Length of Stay (days)   (TBD)   Communicated expected length of stay with patient/caregiver yes   Prior to hospitilization cognitive status: Alert/Oriented   Prior to hospitalization functional status: Independent   Current cognitive status: Alert/Oriented   Current Functional Status: Independent   Facility Arrived From: Home   Lives With spouse   Able to Return to Prior Arrangements yes   Is patient able to care for self after discharge? Yes   Who are your caregiver(s) and their phone number(s)? [facility] (spouse) [phone]   Patient's perception of discharge disposition home or selfcare   Readmission Within the Last 30 Days no previous admission in last 30 days   Patient currently  being followed by outpatient case management? No   Patient currently receives any other outside agency services? No   Equipment Currently Used at Home none   Do you have any problems affording any of your prescribed medications? No   Is the patient taking medications as prescribed? yes   Does the patient have transportation home? Yes   Transportation Anticipated family or friend will provide   Dialysis Name and Scheduled days N/A   Does the patient receive services at the [facility]? Yes   Discharge Plan A Home with family   DME Needed Upon Discharge  none

## 2020-02-04 NOTE — HPI
This is a 59 y.o. female with a PMHx significant only for GERD and tobacco use, who presented to the ED with complaints of substernal chest pain that started suddenly approximately 2 hr PTA.  Pain was pressure-like in nature, moderate in intensity, radiated into bilateral jaw and ears, and nonexertional.  No aggravating factors.  Pain was relieved by 325 mg ASA and 1 in nitro paste given PTA.  Associated mild shortness of breath.  Denies any diaphoresis, nausea, vomiting, palpitations, cough, rhinorrhea, sore throat, congestion, abdominal pain, back pain, headache, lightheadedness or dizziness, syncope, weakness, fever or chills.  Initial workup in the ED was unremarkable with negative troponin x 1 and unrevealing EKG.  Patient was placed in the hospital for further evaluation treatment.

## 2020-02-04 NOTE — H&P
[facility] Hospital Medicine  History & Physical    Patient Name: Sharri House  MRN: [id number]  Admission Date: 2/3/2020  Attending Physician: Zeyad Mendoza MD   Primary Care Provider: Ar Brandt MD         Patient information was obtained from patient, spouse/SO and ER records.     Subjective:     Principal Problem:Chest pain    Chief Complaint:   Chief Complaint   Patient presents with    Chest Pain     sent by Dr. Brandt for sub-sternal CP radiating to jaw x 2 hours; denies SOB        HPI: Ms. House is a 59 y.o. female  with a PMHx significant only for GERD and tobacco use, who presented to the ED with complaints of substernal chest pain that started suddenly approximately 2 hr PTA.  Pain was pressure-like in nature, moderate in intensity, radiated into bilateral jaw and ears, and nonexertional.  No aggravating factors.  Pain was relieved by 325 mg ASA and 1 in nitro paste given PTA.  Associated mild shortness of breath.  Denies any diaphoresis, nausea, vomiting, palpitations, cough, rhinorrhea, sore throat, congestion, abdominal pain, back pain, headache, lightheadedness or dizziness, syncope, weakness, fever or chills.  Initial workup in the ED was unremarkable with negative troponin x 1 and unrevealing EKG.  Hospital Medicine was called for admission.  Currently, patient appears comfortable in no acute distress. She denies any chest pain at present.      Past Medical History:   Diagnosis Date    GERD (gastroesophageal reflux disease)        Past Surgical History:   Procedure Laterality Date    ABDOMINAL SURGERY       SECTION      HIP SURGERY      HYSTERECTOMY         Review of patient's allergies indicates:  No Known Allergies    No current facility-administered medications on file prior to encounter.      Current Outpatient Medications on File Prior to Encounter   Medication Sig    [DISCONTINUED] ciprofloxacin HCl (CIPRO) 500 MG tablet Take 1 tablet (500 mg total) by mouth  every 12 (twelve) hours.    [DISCONTINUED] metroNIDAZOLE (FLAGYL) 500 MG tablet Take 1 tablet (500 mg total) by mouth 3 (three) times daily.     Family History     Reviewed and not pertinent.         Tobacco Use    Smoking status: Current Every Day Smoker     Packs/day: 0.25     Types: Cigarettes    Smokeless tobacco: Never Used   Substance and Sexual Activity    Alcohol use: Yes     Comment: occasional     Drug use: Never    Sexual activity: Not on file     Review of Systems   Constitutional: Negative for chills, diaphoresis, fatigue and fever.   HENT: Negative for congestion, postnasal drip, rhinorrhea, sinus pressure and sore throat.    Respiratory: Positive for shortness of breath. Negative for cough and wheezing.    Cardiovascular: Positive for chest pain. Negative for palpitations and leg swelling.   Gastrointestinal: Negative for abdominal pain, diarrhea, nausea and vomiting.   Genitourinary: Negative for urgency.   Musculoskeletal: Positive for neck pain. Negative for arthralgias, back pain and myalgias.   Skin: Negative for pallor and rash.   Neurological: Negative for dizziness, syncope, weakness, light-headedness, numbness and headaches.   Psychiatric/Behavioral: The patient is not nervous/anxious.    All other systems reviewed and are negative.    Objective:     Vital Signs (Most Recent):  Temp: 97.8 °F (36.6 °C)   Pulse: 72   Resp: 18   BP: 133/71   SpO2: 96 %  Vital Signs (24h Range):  Temp:  [97 °F (36.1 °C)-98.1 °F (36.7 °C)] 97.8 °F (36.6 °C)  Pulse:  [67-81] 72  Resp:  [14-21] 18  SpO2:  [94 %-99 %] 96 %  BP: (124-178)/(63-89) 133/71     Weight: 103.1 kg (227 lb 6.5 oz)  Body mass index is 37.84 kg/m².    Physical Exam   Constitutional: She is oriented to person, place, and time. She appears well-developed and well-nourished. No distress.   HENT:   Head: Normocephalic and atraumatic.   Eyes: Conjunctivae are normal.   PERRL; EOM intact.   Neck: Normal range of motion. Neck supple.    Cardiovascular: Normal rate, regular rhythm, S1 normal, S2 normal and intact distal pulses.  No extrasystoles are present. Exam reveals no gallop and no friction rub.   No murmur heard.  Pulses:       Radial pulses are 2+ on the right side, and 2+ on the left side.        Dorsalis pedis pulses are 2+ on the right side, and 2+ on the left side.        Posterior tibial pulses are 2+ on the right side, and 2+ on the left side.   Pulmonary/Chest: Effort normal and breath sounds normal. No accessory muscle usage. No tachypnea. No respiratory distress. She has no wheezes. She has no rhonchi. She has no rales.   Abdominal: Soft. Bowel sounds are normal. She exhibits no distension. There is no tenderness. There is no rebound, no guarding and no CVA tenderness.   Musculoskeletal: Normal range of motion. She exhibits no edema, tenderness or deformity.   Neurological: She is alert and oriented to person, place, and time. No cranial nerve deficit or sensory deficit.   Skin: Skin is warm, dry and intact. Capillary refill takes less than 2 seconds. No rash noted. She is not diaphoretic. No cyanosis or erythema.   Psychiatric: She has a normal mood and affect. Her speech is normal and behavior is normal. Cognition and memory are normal.   Nursing note and vitals reviewed.          Significant Labs:  Results for orders placed or performed during the hospital encounter of 02/03/20   CBC auto differential   Result Value Ref Range    WBC 4.89 3.90 - 12.70 K/uL    RBC 4.93 4.00 - 5.40 M/uL    Hemoglobin 14.0 12.0 - 16.0 g/dL    Hematocrit 42.1 37.0 - 48.5 %    Mean Corpuscular Volume 85 82 - 98 fL    Mean Corpuscular Hemoglobin 28.4 27.0 - 31.0 pg    Mean Corpuscular Hemoglobin Conc 33.3 32.0 - 36.0 g/dL    RDW 12.4 11.5 - 14.5 %    Platelets 252 150 - 350 K/uL    MPV 10.2 9.2 - 12.9 fL    Immature Granulocytes 0.2 0.0 - 0.5 %    Gran # (ANC) 2.5 1.8 - 7.7 K/uL    Immature Grans (Abs) 0.01 0.00 - 0.04 K/uL    Lymph # 1.7 1.0 - 4.8  K/uL    Mono # 0.5 0.3 - 1.0 K/uL    Eos # 0.2 0.0 - 0.5 K/uL    Baso # 0.05 0.00 - 0.20 K/uL    nRBC 0 0 /100 WBC    Gran% 51.0 38.0 - 73.0 %    Lymph% 33.9 18.0 - 48.0 %    Mono% 10.8 4.0 - 15.0 %    Eosinophil% 3.1 0.0 - 8.0 %    Basophil% 1.0 0.0 - 1.9 %    Differential Method Automated    Comprehensive metabolic panel   Result Value Ref Range    Sodium 138 136 - 145 mmol/L    Potassium 4.1 3.5 - 5.1 mmol/L    Chloride 104 95 - 110 mmol/L    CO2 22 (L) 23 - 29 mmol/L    Glucose 97 70 - 110 mg/dL    BUN, Bld 14 6 - 20 mg/dL    Creatinine 0.8 0.5 - 1.4 mg/dL    Calcium 9.5 8.7 - 10.5 mg/dL    Total Protein 7.2 6.0 - 8.4 g/dL    Albumin 3.8 3.5 - 5.2 g/dL    Total Bilirubin 0.5 0.1 - 1.0 mg/dL    Alkaline Phosphatase 68 55 - 135 U/L    AST 23 10 - 40 U/L    ALT 28 10 - 44 U/L    Anion Gap 12 8 - 16 mmol/L    eGFR if African American >60 >60 mL/min/1.73 m^2    eGFR if non African American >60 >60 mL/min/1.73 m^2   Urinalysis, Reflex to Urine Culture Urine, Clean Catch   Result Value Ref Range    Specimen UA Urine, Clean Catch     Color, UA Yellow Yellow, Straw, Ebony    Appearance, UA Clear Clear    pH, UA 5.0 5.0 - 8.0    Specific Gravity, UA >=1.030 (A) 1.005 - 1.030    Protein, UA Negative Negative    Glucose, UA Negative Negative    Ketones, UA Trace (A) Negative    Bilirubin (UA) Negative Negative    Occult Blood UA Trace (A) Negative    Nitrite, UA Negative Negative    Urobilinogen, UA Negative <2.0 EU/dL    Leukocytes, UA Negative Negative   Brain natriuretic peptide   Result Value Ref Range    BNP 14 0 - 99 pg/mL   CK   Result Value Ref Range     20 - 180 U/L   Troponin I   Result Value Ref Range    Troponin I <0.006 0.000 - 0.026 ng/mL      All pertinent labs within the past 24 hours have been reviewed.    Significant Imaging:  Imaging Results          X-Ray Chest PA And Lateral (Final result)  Result time 02/03/20 16:30:51    Final result by YI Dunham Sr., MD (02/03/20 16:30:51)                  Impression:      Normal study.      Electronically signed by: Bruno Dunham MD  Date:    02/03/2020  Time:    16:30             Narrative:    EXAMINATION:  XR CHEST PA AND LATERAL    CLINICAL HISTORY:  chest pain;    COMPARISON:  10/15/2009    FINDINGS:  The size and contour of the heart are normal. The lungs are clear. There is no pneumothorax or pleural effusion.                               I have reviewed all pertinent imaging results/findings within the past 24 hours.     EKG: (personally reviewed)  Normal sinus rhythm, no acute ischemic ST-T abnormality.  No previous to compare.            Assessment/Plan:     * Chest pain  - Chest pain-free on admission.  Troponin negative x 1.  EKG unrevealing.  - Trends serial cardiac enzymes.  - Add 81 mg aspirin daily.  - Check FLP in add statin if warranted.  - Further recs pending clinical course.    Elevated blood pressure reading without diagnosis of hypertension  - Blood pressure controlled on admission.  Will follow BP trends and add oral antihypertensive if needed.    Tobacco abuse disorder  - Counseled on cessation.  - Nicotine patch if needed.      VTE Risk Mitigation (From admission, onward)         Ordered     enoxaparin injection 40 mg  Daily      02/03/20 2304     IP VTE HIGH RISK PATIENT  Once      02/03/20 2304     Place sequential compression device  Until discontinued      02/03/20 2304                   Patricia Otero NP  Department of Hospital Medicine   [facility]

## 2020-02-04 NOTE — ASSESSMENT & PLAN NOTE
- Blood pressure controlled on admission.  Will follow BP trends and add oral antihypertensive if needed.

## 2020-02-04 NOTE — PLAN OF CARE
Pt resting quietly in bed with eyes closed. Denies cp at this time  Pt AAO x4.  Pt remains NSR on Cardiac Monitor.  Pt remains afebrile and free from falls.  Pt denies pain and needs at this time.  Poc reviewed with pt and verbalized understanding.   Safety intact. Bed in low position. Call light in reach sr up x2. Will cont to monitor and eval throughout shift.

## 2020-02-04 NOTE — DISCHARGE INSTRUCTIONS
Thank you for choosing [facility] for your medical care. The primary doctor who is taking care of you at the time of your discharge is Abner Altamirano MD.     You were admitted to the hospital with Chest pain.     Please note your discharge instructions, including diet/activity restrictions, follow-up appointments, and medication changes.  If you have any questions about your medical issues, prescriptions, or any other questions, please feel free to contact the [facility] Medicine Dept at [phone] and we will help.    Please direct all long term medication refills and follow up to your primary care provider, Ar Brandt MD. Thank you again for letting us take care of your health care needs.    Please note the following discharge instructions per your discharging physician-  Jyoti Maurer Np

## 2020-02-04 NOTE — NURSING
Discharge summary, health teachings and instructions given to patient--verbalized understanding. IV removed-no complications noted. Tele monitor removed and returned to the monitor room. All questions answered with regards to discharge instructions. Reminded of the importance of follow-up appointments. For possible stress test tomorrow--patient aware of.

## 2020-02-04 NOTE — ASSESSMENT & PLAN NOTE
- Chest pain-free on admission.  Troponin negative x 1.  EKG unrevealing.  - Trends serial cardiac enzymes.  - Add 81 mg aspirin daily.  - Check FLP in add statin if warranted.  - Further recs pending clinical course.

## 2020-02-04 NOTE — HPI
"Sharri House is a 59 year old female who presented to [facility] from PCP office due to chest pain with associated SOB. She received ASA and NTG SL PTA with relief of chest pain. No previous medical history per patient. ED workup revealed negative troponin x 3, EKG unremarkable. She was placed in observation under the care of hospital medicine. Cardiology consulted to assist with medical management. Chart reviewed, Patient seen and examined. She endorses episode of tunnel vision, dizziness, chest pain at work yesterday after lunch and EMS was called to scene but told her everything was "fine" and she was seen at PCP office with additional episode of chest pain and SOB episode. No further chest pain since arrival to facility. Denies shortness of breath, WALTER or palpitations. No leg swelling or claudications. ECHO pending. Plan for MPI stress test today, further recs to follow.   "

## 2020-02-04 NOTE — ASSESSMENT & PLAN NOTE
-troponin negative x 3  -ECHO pending  -Given +smoking history and multiple episodes yesterday, plan for MPI stress test today  -Continue ASA daily  -Given stigmata for RAFFAELE, would recommend OP sleep study

## 2020-02-05 ENCOUNTER — TELEPHONE (OUTPATIENT)
Dept: CARDIOLOGY | Facility: CLINIC | Age: 60
End: 2020-02-05

## 2020-02-05 ENCOUNTER — PATIENT MESSAGE (OUTPATIENT)
Dept: CARDIOLOGY | Facility: CLINIC | Age: 60
End: 2020-02-05

## 2020-02-07 DIAGNOSIS — R07.9 CHEST PAIN: Primary | ICD-10-CM

## 2020-02-11 ENCOUNTER — HOSPITAL ENCOUNTER (OUTPATIENT)
Dept: RADIOLOGY | Facility: HOSPITAL | Age: 60
Discharge: HOME | End: 2020-02-11
Attending: NURSE PRACTITIONER
Payer: COMMERCIAL

## 2020-02-11 ENCOUNTER — TELEPHONE (OUTPATIENT)
Dept: CARDIOLOGY | Facility: CLINIC | Age: 60
End: 2020-02-11

## 2020-02-11 ENCOUNTER — HOSPITAL ENCOUNTER (OUTPATIENT)
Dept: CARDIOLOGY | Facility: HOSPITAL | Age: 60
Discharge: HOME | End: 2020-02-11
Attending: NURSE PRACTITIONER
Payer: COMMERCIAL

## 2020-02-11 VITALS
DIASTOLIC BLOOD PRESSURE: 67 MMHG | SYSTOLIC BLOOD PRESSURE: 127 MMHG | HEIGHT: 65 IN | HEART RATE: 68 BPM | BODY MASS INDEX: 37.65 KG/M2 | WEIGHT: 226 LBS

## 2020-02-11 DIAGNOSIS — R07.9 CHEST PAIN: ICD-10-CM

## 2020-02-11 LAB
CV PHARM DOSE: 0.4 MG
CV STRESS BASE HR: 68 BPM
DIASTOLIC BLOOD PRESSURE: 67 MMHG
NUC REST EJECTION FRACTION: 77
NUC STRESS EJECTION FRACTION: 68 %
OHS CV CPX 85 PERCENT MAX PREDICTED HEART RATE MALE: 131
OHS CV CPX MAX PREDICTED HEART RATE: 154
OHS CV CPX PATIENT IS FEMALE: 1
OHS CV CPX PATIENT IS MALE: 0
OHS CV CPX PEAK DIASTOLIC BLOOD PRESSURE: 69 MMHG
OHS CV CPX PEAK HEAR RATE: 105 BPM
OHS CV CPX PEAK RATE PRESSURE PRODUCT: NORMAL
OHS CV CPX PEAK SYSTOLIC BLOOD PRESSURE: 149 MMHG
OHS CV CPX PERCENT MAX PREDICTED HEART RATE ACHIEVED: 68
OHS CV CPX RATE PRESSURE PRODUCT PRESENTING: 8636
STRESS ECHO POST EXERCISE DUR SEC: 57 SECONDS
STRESS ECHO TARGET HR: 137 BPM
SYSTOLIC BLOOD PRESSURE: 127 MMHG

## 2020-02-11 PROCEDURE — 93018 CV STRESS TEST I&R ONLY: CPT | Mod: ,,, | Performed by: INTERNAL MEDICINE

## 2020-02-11 PROCEDURE — 93018 STRESS TEST WITH MYOCARDIAL PERFUSION (CUPID ONLY): ICD-10-PCS | Mod: ,,, | Performed by: INTERNAL MEDICINE

## 2020-02-11 PROCEDURE — 78452 STRESS TEST WITH MYOCARDIAL PERFUSION (CUPID ONLY): ICD-10-PCS | Mod: 26,,, | Performed by: INTERNAL MEDICINE

## 2020-02-11 PROCEDURE — A9502 TC99M TETROFOSMIN: HCPCS

## 2020-02-11 PROCEDURE — 78452 HT MUSCLE IMAGE SPECT MULT: CPT | Mod: 26,,, | Performed by: INTERNAL MEDICINE

## 2020-02-11 PROCEDURE — 93016 STRESS TEST WITH MYOCARDIAL PERFUSION (CUPID ONLY): ICD-10-PCS | Mod: ,,, | Performed by: INTERNAL MEDICINE

## 2020-02-11 PROCEDURE — 93016 CV STRESS TEST SUPVJ ONLY: CPT | Mod: ,,, | Performed by: INTERNAL MEDICINE

## 2020-02-11 RX ORDER — REGADENOSON 0.08 MG/ML
0.4 INJECTION, SOLUTION INTRAVENOUS ONCE
Status: COMPLETED | OUTPATIENT
Start: 2020-02-11 | End: 2020-02-11

## 2020-02-11 RX ADMIN — REGADENOSON 0.4 MG: 0.08 INJECTION, SOLUTION INTRAVENOUS at 09:02

## 2020-02-11 NOTE — TELEPHONE ENCOUNTER
----- Message from HOLLAND Sherwood-DEVON sent at 2/11/2020  3:45 PM CST -----  Please call patient    I have reviewed her stress test  It did not reveal any blockages    Keiko

## 2020-02-13 PROBLEM — R07.89 ATYPICAL CHEST PAIN: Status: ACTIVE | Noted: 2020-02-03

## 2020-02-13 PROBLEM — Z01.89 NEED FOR ASSESSMENT FOR SLEEP APNEA: Status: ACTIVE | Noted: 2020-02-13

## 2020-02-18 ENCOUNTER — OFFICE VISIT (OUTPATIENT)
Dept: FAMILY MEDICINE | Facility: CLINIC | Age: 60
End: 2020-02-18
Payer: COMMERCIAL

## 2020-02-18 ENCOUNTER — LAB VISIT (OUTPATIENT)
Dept: LAB | Facility: HOSPITAL | Age: 60
End: 2020-02-18
Attending: FAMILY MEDICINE
Payer: COMMERCIAL

## 2020-02-18 VITALS
HEIGHT: 65 IN | HEART RATE: 91 BPM | SYSTOLIC BLOOD PRESSURE: 136 MMHG | BODY MASS INDEX: 37.61 KG/M2 | WEIGHT: 225.75 LBS | OXYGEN SATURATION: 96 % | DIASTOLIC BLOOD PRESSURE: 84 MMHG | TEMPERATURE: 97 F

## 2020-02-18 DIAGNOSIS — J44.9 CHRONIC OBSTRUCTIVE PULMONARY DISEASE, UNSPECIFIED COPD TYPE: ICD-10-CM

## 2020-02-18 DIAGNOSIS — Z87.19 H/O CHRONIC HEPATITIS: ICD-10-CM

## 2020-02-18 DIAGNOSIS — Z12.39 SCREENING FOR BREAST CANCER: ICD-10-CM

## 2020-02-18 DIAGNOSIS — M19.049 HAND ARTHRITIS: ICD-10-CM

## 2020-02-18 DIAGNOSIS — Z12.11 SCREENING FOR COLON CANCER: ICD-10-CM

## 2020-02-18 DIAGNOSIS — Z72.0 TOBACCO ABUSE: ICD-10-CM

## 2020-02-18 DIAGNOSIS — R07.9 CHEST PAIN, UNSPECIFIED TYPE: Primary | ICD-10-CM

## 2020-02-18 PROCEDURE — 87522 HEPATITIS C REVRS TRNSCRPJ: CPT

## 2020-02-18 PROCEDURE — 86803 HEPATITIS C AB TEST: CPT

## 2020-02-18 PROCEDURE — 99214 PR OFFICE/OUTPT VISIT, EST, LEVL IV, 30-39 MIN: ICD-10-PCS | Mod: S$GLB,,, | Performed by: FAMILY MEDICINE

## 2020-02-18 PROCEDURE — 99214 OFFICE O/P EST MOD 30 MIN: CPT | Mod: S$GLB,,, | Performed by: FAMILY MEDICINE

## 2020-02-18 PROCEDURE — 99999 PR PBB SHADOW E&M-EST. PATIENT-LVL IV: ICD-10-PCS | Mod: PBBFAC,,, | Performed by: FAMILY MEDICINE

## 2020-02-18 PROCEDURE — 99999 PR PBB SHADOW E&M-EST. PATIENT-LVL IV: CPT | Mod: PBBFAC,,, | Performed by: FAMILY MEDICINE

## 2020-02-18 RX ORDER — IBUPROFEN 200 MG
200 TABLET ORAL EVERY 6 HOURS PRN
Status: ON HOLD | COMMUNITY
End: 2020-09-11 | Stop reason: HOSPADM

## 2020-02-18 NOTE — PROGRESS NOTES
Subjective:       Patient ID: Sharri House is a 59 y.o. female.    Chief Complaint: Hospital Follow Up      HPI Comments:       Current Outpatient Medications:     ibuprofen (ADVIL,MOTRIN) 200 MG tablet, Take 200 mg by mouth every 6 (six) hours as needed for Pain., Disp: , Rfl:     acetaminophen (TYLENOL) 325 MG tablet, Take 2 tablets (650 mg total) by mouth every 6 (six) hours as needed. (Patient not taking: Reported on 2/18/2020), Disp: , Rfl: 0    pantoprazole (PROTONIX) 40 MG tablet, Take 1 tablet (40 mg total) by mouth once daily. for 14 days (Patient not taking: Reported on 2/18/2020), Disp: 14 tablet, Rfl: 0      Last time she was here I sent her to the emergency room a chest pains.  That hospitalization resulted in no good explanation for the chest pain.  She subsequently had a nuclear medicine stress test that was completely normal.  No more chest pain.     Other past medical history:  Treated by 8 years ago for hepatitis C.  Was told that she was cured.  Most of the treatment was in [place].  Like to be retested.  Also had schwannomas removed from her stomach, pancreas, liver areas years ago.    Does not get heartburn very often.  Takes Nexium when she needs something.    Has problems with her hands.  Knots on her D IP joints.  Some pain.  Would like to seek treatment for this    He smokes a handful of cigarettes a day.  Some baseline shortness of breath.  Has never been evaluated for COPD    Review of Systems   Constitutional: Negative for activity change, appetite change and fever.   HENT: Negative for sore throat.    Respiratory: Negative for cough and shortness of breath.    Cardiovascular: Negative for chest pain.   Gastrointestinal: Negative for abdominal pain, diarrhea and nausea.   Genitourinary: Negative for difficulty urinating.   Musculoskeletal: Positive for arthralgias. Negative for myalgias.   Neurological: Negative for dizziness and headaches.       Objective:      Vitals:     "02/18/20 0828   BP: 136/84   Pulse: 91   Temp: 97.4 °F (36.3 °C)   TempSrc: Tympanic   SpO2: 96%   Weight: 102.4 kg (225 lb 12 oz)   Height: 5' 5" (1.651 m)   PainSc:   4   PainLoc: Generalized     Physical Exam   Constitutional: She is oriented to person, place, and time. She appears well-developed and well-nourished. No distress.   HENT:   Head: Normocephalic.   Mouth/Throat: No oropharyngeal exudate.   Neck: Neck supple. No thyromegaly present.   Cardiovascular: Normal rate, regular rhythm and normal heart sounds.   No murmur heard.  Pulmonary/Chest: Effort normal and breath sounds normal. She has no wheezes. She has no rales.   Abdominal: Soft. She exhibits no distension.   Musculoskeletal: She exhibits no edema.        Hands:  Lymphadenopathy:     She has no cervical adenopathy.   Neurological: She is alert and oriented to person, place, and time.   Skin: Skin is warm and dry. She is not diaphoretic.   Psychiatric: She has a normal mood and affect. Her behavior is normal. Judgment and thought content normal.   Nursing note and vitals reviewed.      Assessment:       1. Chest pain, unspecified type    2. Tobacco abuse    3. H/O chronic hepatitis    4. Screening for breast cancer    5. Screening for colon cancer    6. Hand arthritis    7. Chronic obstructive pulmonary disease, unspecified COPD type        Plan:   Chest pain, unspecified type  Comments:  No further chest pain.  Will monitor.  Not taking any PPI on a regular basis currently    Tobacco abuse  Comments:  Down to few cigarettes per day    H/O chronic hepatitis  Comments:  Successfully cured several years ago.  Wants to be recheck  today  Orders:  -     Hepatitis C antibody; Future; Expected date: 02/18/2020  -     HEPATITIS C RNA, QUANTITATIVE, PCR; Future; Expected date: 02/18/2020    Screening for breast cancer  Comments:  Mammogram ordered  Orders:  -     Mammo Digital Screening Bilat; Future; Expected date: 02/18/2020    Screening for colon " cancer  Comments:  Initial screening colonoscopy ordered  Orders:  -     Case request GI: COLONOSCOPY    Hand arthritis  Comments:  DIP nodules.  Rheumatology consult  Orders:  -     Ambulatory referral/consult to Rheumatology; Future; Expected date: 02/25/2020    Chronic obstructive pulmonary disease, unspecified COPD type  Comments:  Pulmonology consult  Orders:  -     Ambulatory referral/consult to Pulmonology; Future; Expected date: 02/25/2020

## 2020-02-19 ENCOUNTER — TELEPHONE (OUTPATIENT)
Dept: ENDOSCOPY | Facility: HOSPITAL | Age: 60
End: 2020-02-19

## 2020-02-19 ENCOUNTER — HOSPITAL ENCOUNTER (OUTPATIENT)
Dept: RADIOLOGY | Facility: HOSPITAL | Age: 60
Discharge: HOME | End: 2020-02-19
Attending: INTERNAL MEDICINE
Payer: COMMERCIAL

## 2020-02-19 ENCOUNTER — OFFICE VISIT (OUTPATIENT)
Dept: RHEUMATOLOGY | Facility: CLINIC | Age: 60
End: 2020-02-19
Payer: COMMERCIAL

## 2020-02-19 VITALS
DIASTOLIC BLOOD PRESSURE: 88 MMHG | WEIGHT: 226.88 LBS | BODY MASS INDEX: 37.75 KG/M2 | SYSTOLIC BLOOD PRESSURE: 148 MMHG | HEART RATE: 75 BPM

## 2020-02-19 DIAGNOSIS — M06.4 UNDIFFERENTIATED INFLAMMATORY ARTHRITIS: Primary | ICD-10-CM

## 2020-02-19 DIAGNOSIS — M06.4 UNDIFFERENTIATED INFLAMMATORY ARTHRITIS: ICD-10-CM

## 2020-02-19 DIAGNOSIS — M19.049 HAND ARTHRITIS: ICD-10-CM

## 2020-02-19 PROBLEM — M19.90 UNDIFFERENTIATED INFLAMMATORY ARTHRITIS: Status: ACTIVE | Noted: 2020-02-19

## 2020-02-19 PROCEDURE — 99999 PR PBB SHADOW E&M-EST. PATIENT-LVL III: CPT | Mod: PBBFAC,,, | Performed by: INTERNAL MEDICINE

## 2020-02-19 PROCEDURE — 99999 PR PBB SHADOW E&M-EST. PATIENT-LVL III: ICD-10-PCS | Mod: PBBFAC,,, | Performed by: INTERNAL MEDICINE

## 2020-02-19 PROCEDURE — 73630 XR FOOT COMPLETE 3 VIEW BILATERAL: ICD-10-PCS | Mod: 26,50,, | Performed by: RADIOLOGY

## 2020-02-19 PROCEDURE — 99204 PR OFFICE/OUTPT VISIT, NEW, LEVL IV, 45-59 MIN: ICD-10-PCS | Mod: S$GLB,,, | Performed by: INTERNAL MEDICINE

## 2020-02-19 PROCEDURE — 99204 OFFICE O/P NEW MOD 45 MIN: CPT | Mod: S$GLB,,, | Performed by: INTERNAL MEDICINE

## 2020-02-19 PROCEDURE — 73630 X-RAY EXAM OF FOOT: CPT | Mod: 26,50,, | Performed by: RADIOLOGY

## 2020-02-19 PROCEDURE — 73130 XR HAND COMPLETE 3 VIEWS BILATERAL: ICD-10-PCS | Mod: 26,50,, | Performed by: RADIOLOGY

## 2020-02-19 PROCEDURE — 73130 X-RAY EXAM OF HAND: CPT | Mod: 26,50,, | Performed by: RADIOLOGY

## 2020-02-19 PROCEDURE — 73630 X-RAY EXAM OF FOOT: CPT | Mod: TC,50

## 2020-02-19 PROCEDURE — 73130 X-RAY EXAM OF HAND: CPT | Mod: TC,50

## 2020-02-19 RX ORDER — METHYLPREDNISOLONE 4 MG/1
TABLET ORAL
Qty: 1 PACKAGE | Refills: 0 | Status: SHIPPED | OUTPATIENT
Start: 2020-02-19 | End: 2020-05-28 | Stop reason: ALTCHOICE

## 2020-02-19 NOTE — LETTER
February 20, 2020      Ar Brandt MD  [address] [place] [id number]           [facility] Rheumatology  [address] [facility] [address]  Phone: [phone]  Fax: [phone]          Patient: Sharri House   MR Number: [id number]   YOB: 1960   Date of Visit: 2/19/2020       Dear Dr. Ar Brandt:    Thank you for referring Sharri House to me for evaluation. Attached you will find relevant portions of my assessment and plan of care.    If you have questions, please do not hesitate to call me. I look forward to following Sharri House along with you.    Sincerely,    Inocente Wong MD    Enclosure  CC:  No Recipients    If you would like to receive this communication electronically, please contact [email or web address] or [phone] to request more information on [employer] access.    For providers and/or their staff who would like to refer a patient to [facility], please contact us through our one-stop-shop provider referral line, [facility] , at [phone].    If you feel you have received this communication in error or would no longer like to receive these types of communications, please e-mail [email or web address]

## 2020-02-19 NOTE — PROGRESS NOTES
RHEUMATOLOGY CLINIC INITIAL VISIT    Reason for consult:-  Hand pain    Chief complaints:-  Pain over bilateral hands.    HPI:-  Sharri Gannon a 59 y.o. pleasant female comes in for an initial visit with above chief complaints.  She has longstanding history of chronic, progressive, achy, activity-related pain over bilateral fingers.  Recently the pain has gotten significantly worse associated with deformities.  Prolonged morning stiffness sometimes lasting for an hour.  Not responding to over-the-counter medications including anti-inflammatories.  Her mom had severe deformities in her hands.  She denies any personal history of psoriasis.  No family history of psoriasis.  No photosensitive malar rash, sicca syndrome or Raynaud's phenomenon.  Pain is primarily restricted to the PIP joints and sometimes involves both DIP and MCP joints.  She has activity-related pain around large joints associated with crepitus but no significant stiffness or swelling.  Some pain in her toes.    Review of Systems   Constitutional: Negative for chills and fever.   HENT: Negative for congestion and sore throat.    Eyes: Negative for blurred vision and redness.   Respiratory: Negative for cough and shortness of breath.    Cardiovascular: Negative for chest pain and leg swelling.   Gastrointestinal: Negative for abdominal pain.   Genitourinary: Negative for dysuria.   Musculoskeletal: Positive for back pain and joint pain. Negative for falls, myalgias and neck pain.   Skin: Negative for rash.   Neurological: Negative for headaches.   Endo/Heme/Allergies: Does not bruise/bleed easily.   Psychiatric/Behavioral: Negative for memory loss. The patient does not have insomnia.        Past Medical History:   Diagnosis Date    GERD (gastroesophageal reflux disease)        Past Surgical History:   Procedure Laterality Date    ABDOMINAL SURGERY       SECTION      HIP SURGERY      HYSTERECTOMY          Social History     Tobacco Use     Smoking status: Current Every Day Smoker     Packs/day: 0.25     Types: Cigarettes    Smokeless tobacco: Never Used   Substance Use Topics    Alcohol use: Yes     Comment: occasional     Drug use: Never       History reviewed. No pertinent family history.    Review of patient's allergies indicates:  No Known Allergies    Vitals:    02/19/20 1630   BP: (!) 148/88   Pulse: 75   Weight: 102.9 kg (226 lb 13.7 oz)   PainSc:   4   PainLoc: Hand       Physical Exam   Constitutional: She is oriented to person, place, and time and well-developed, well-nourished, and in no distress. No distress.   HENT:   Head: Normocephalic.   Mouth/Throat: Oropharynx is clear and moist.   Eyes: Pupils are equal, round, and reactive to light. Conjunctivae and EOM are normal.   Neck: Normal range of motion. Neck supple.   Cardiovascular: Normal rate and intact distal pulses.   Pulmonary/Chest: Effort normal. No respiratory distress.   Abdominal: Soft. There is no tenderness.   Musculoskeletal:   FUSIFORM SWELLING OF BILATERAL FINGERS WITH PIP AND DIP NODULES associated with synovitis of few nodules.  MCP joints were relatively spared.  No dactylitis of toes.  Nontender bilateral 1st MTP joints.  Crepitus over large joints.  No effusion   Neurological: She is alert and oriented to person, place, and time. No cranial nerve deficit.   Skin: Skin is warm. No rash noted. No erythema.   Psychiatric: Mood and affect normal.   Nursing note and vitals reviewed.      Labs:-  Normal CBC.     Radiographs:-  Independent visualization of images done.    Old and Outside medical records :-  Reviewed old and all outside medical records available in Care Everywhere.    Medication List with Changes/Refills   New Medications    METHYLPREDNISOLONE (MEDROL DOSEPACK) 4 MG TABLET    use as directed   Current Medications    ACETAMINOPHEN (TYLENOL) 325 MG TABLET    Take 2 tablets (650 mg total) by mouth every 6 (six) hours as needed.    IBUPROFEN (ADVIL,MOTRIN)  200 MG TABLET    Take 200 mg by mouth every 6 (six) hours as needed for Pain.    PANTOPRAZOLE (PROTONIX) 40 MG TABLET    Take 1 tablet (40 mg total) by mouth once daily. for 14 days       Assessment/Plans:-  1. Undifferentiated inflammatory arthritis    2. Hand arthritis      Problem List Items Addressed This Visit        Orthopedic    Undifferentiated inflammatory arthritis - Primary    Current Assessment & Plan     Based on her history and physical exam she most likely has osteoarthritis but depending on her size of nodules and associated synovitis she has more of an inflammatory arthritis than osteoarthritis.  It could be an inflammatory osteoarthritis or connective tissue disease arthritis like rheumatoid/psoriatic/lupus arthritis.  X-ray joints today.  Check labs to rule out any evidence of rheumatoid arthritis or lupus.  No personal or family history of psoriasis.  Check inflammatory markers today.  Try 1 Medrol Dosepak to see how much active inflammation she has.         Relevant Medications    methylPREDNISolone (MEDROL DOSEPACK) 4 mg tablet    Other Relevant Orders    ABBEY Screen w/Reflex    Cyclic citrul peptide antibody, IgG    C-reactive protein    Sedimentation rate (Completed)    Rheumatoid factor (Completed)    X-Ray Hand 3 View Bilateral (Completed)    X-Ray Foot Complete Bilateral (Completed)    Uric acid      Other Visit Diagnoses     Hand arthritis        DIP nodules.  Rheumatology consult    Relevant Medications    methylPREDNISolone (MEDROL DOSEPACK) 4 mg tablet    Other Relevant Orders    ABBEY Screen w/Reflex    Cyclic citrul peptide antibody, IgG    C-reactive protein    Sedimentation rate (Completed)    Rheumatoid factor (Completed)    X-Ray Hand 3 View Bilateral (Completed)    X-Ray Foot Complete Bilateral (Completed)          Follow up in about 3 weeks (around 3/11/2020).    Thank you for allowing me to participate in the care ofSindicandace House.    Disclaimer: This note was prepared using  voice recognition system and is likely to have sound alike errors and is not proof read.  Please call me with any questions.

## 2020-02-19 NOTE — ASSESSMENT & PLAN NOTE
Based on her history and physical exam she most likely has osteoarthritis but depending on her size of nodules and associated synovitis she has more of an inflammatory arthritis than osteoarthritis.  It could be an inflammatory osteoarthritis or connective tissue disease arthritis like rheumatoid/psoriatic/lupus arthritis.  X-ray joints today.  Check labs to rule out any evidence of rheumatoid arthritis or lupus.  No personal or family history of psoriasis.  Check inflammatory markers today.  Try 1 Medrol Dosepak to see how much active inflammation she has.

## 2020-02-20 LAB
HCV AB SERPL QL IA: POSITIVE
HCV RNA SERPL NAA+PROBE-LOG IU: <1.08 LOG (10) IU/ML
HCV RNA SERPL QL NAA+PROBE: NOT DETECTED IU/ML
HCV RNA SPEC NAA+PROBE-ACNC: <12 IU/ML

## 2020-02-24 ENCOUNTER — OFFICE VISIT (OUTPATIENT)
Dept: FAMILY MEDICINE | Facility: CLINIC | Age: 60
End: 2020-02-24
Payer: COMMERCIAL

## 2020-02-24 VITALS
OXYGEN SATURATION: 95 % | HEART RATE: 83 BPM | DIASTOLIC BLOOD PRESSURE: 74 MMHG | WEIGHT: 226.19 LBS | SYSTOLIC BLOOD PRESSURE: 122 MMHG | BODY MASS INDEX: 37.69 KG/M2 | TEMPERATURE: 98 F | HEIGHT: 65 IN

## 2020-02-24 DIAGNOSIS — N30.01 ACUTE CYSTITIS WITH HEMATURIA: Primary | ICD-10-CM

## 2020-02-24 DIAGNOSIS — Z23 NEED FOR VACCINATION: ICD-10-CM

## 2020-02-24 DIAGNOSIS — Z00.00 HEALTHCARE MAINTENANCE: ICD-10-CM

## 2020-02-24 LAB
BILIRUB SERPL-MCNC: NEGATIVE MG/DL
BLOOD URINE, POC: 250
COLOR, POC UA: ABNORMAL
GLUCOSE UR QL STRIP: NORMAL
KETONES UR QL STRIP: NEGATIVE
LEUKOCYTE ESTERASE URINE, POC: ABNORMAL
NITRITE, POC UA: POSITIVE
PH, POC UA: 5
PROTEIN, POC: 30
SPECIFIC GRAVITY, POC UA: 1.01
UROBILINOGEN, POC UA: NORMAL

## 2020-02-24 PROCEDURE — 99999 PR PBB SHADOW E&M-EST. PATIENT-LVL III: ICD-10-PCS | Mod: PBBFAC,,, | Performed by: FAMILY MEDICINE

## 2020-02-24 PROCEDURE — 99999 PR PBB SHADOW E&M-EST. PATIENT-LVL III: CPT | Mod: PBBFAC,,, | Performed by: FAMILY MEDICINE

## 2020-02-24 PROCEDURE — 87077 CULTURE AEROBIC IDENTIFY: CPT

## 2020-02-24 PROCEDURE — 87088 URINE BACTERIA CULTURE: CPT

## 2020-02-24 PROCEDURE — 90732 PNEUMOCOCCAL POLYSACCHARIDE VACCINE 23-VALENT =>2YO SQ IM: ICD-10-PCS | Mod: S$GLB,,, | Performed by: FAMILY MEDICINE

## 2020-02-24 PROCEDURE — 87186 SC STD MICRODIL/AGAR DIL: CPT

## 2020-02-24 PROCEDURE — 90471 IMMUNIZATION ADMIN: CPT | Mod: S$GLB,,, | Performed by: FAMILY MEDICINE

## 2020-02-24 PROCEDURE — 87086 URINE CULTURE/COLONY COUNT: CPT

## 2020-02-24 PROCEDURE — 99214 OFFICE O/P EST MOD 30 MIN: CPT | Mod: 25,S$GLB,, | Performed by: FAMILY MEDICINE

## 2020-02-24 PROCEDURE — 90732 PPSV23 VACC 2 YRS+ SUBQ/IM: CPT | Mod: S$GLB,,, | Performed by: FAMILY MEDICINE

## 2020-02-24 PROCEDURE — 99214 PR OFFICE/OUTPT VISIT, EST, LEVL IV, 30-39 MIN: ICD-10-PCS | Mod: 25,S$GLB,, | Performed by: FAMILY MEDICINE

## 2020-02-24 PROCEDURE — 90471 PNEUMOCOCCAL POLYSACCHARIDE VACCINE 23-VALENT =>2YO SQ IM: ICD-10-PCS | Mod: S$GLB,,, | Performed by: FAMILY MEDICINE

## 2020-02-24 PROCEDURE — 81002 URINALYSIS NONAUTO W/O SCOPE: CPT | Mod: S$GLB,,, | Performed by: FAMILY MEDICINE

## 2020-02-24 PROCEDURE — 81002 POCT URINE DIPSTICK WITHOUT MICROSCOPE: ICD-10-PCS | Mod: S$GLB,,, | Performed by: FAMILY MEDICINE

## 2020-02-24 RX ORDER — NITROFURANTOIN 25; 75 MG/1; MG/1
100 CAPSULE ORAL 2 TIMES DAILY
Qty: 20 CAPSULE | Refills: 0 | Status: SHIPPED | OUTPATIENT
Start: 2020-02-24 | End: 2020-03-19 | Stop reason: SDUPTHER

## 2020-02-24 RX ORDER — PHENAZOPYRIDINE HYDROCHLORIDE 200 MG/1
200 TABLET, FILM COATED ORAL 3 TIMES DAILY PRN
Qty: 6 TABLET | Refills: 0 | Status: SHIPPED | OUTPATIENT
Start: 2020-02-24 | End: 2020-03-05

## 2020-02-24 NOTE — PROGRESS NOTES
Subjective:       Patient ID: Sharri House is a 59 y.o. female.    Chief Complaint: Cystitis      HPI Comments:       Current Outpatient Medications:     acetaminophen (TYLENOL) 325 MG tablet, Take 2 tablets (650 mg total) by mouth every 6 (six) hours as needed. (Patient not taking: Reported on 2/18/2020), Disp: , Rfl: 0    ibuprofen (ADVIL,MOTRIN) 200 MG tablet, Take 200 mg by mouth every 6 (six) hours as needed for Pain., Disp: , Rfl:     methylPREDNISolone (MEDROL DOSEPACK) 4 mg tablet, use as directed, Disp: 1 Package, Rfl: 0    nitrofurantoin, macrocrystal-monohydrate, (MACROBID) 100 MG capsule, Take 1 capsule (100 mg total) by mouth 2 (two) times daily., Disp: 20 capsule, Rfl: 0    pantoprazole (PROTONIX) 40 MG tablet, Take 1 tablet (40 mg total) by mouth once daily. for 14 days (Patient not taking: Reported on 2/18/2020), Disp: 14 tablet, Rfl: 0    phenazopyridine (PYRIDIUM) 200 MG tablet, Take 1 tablet (200 mg total) by mouth 3 (three) times daily as needed for Pain., Disp: 6 tablet, Rfl: 0      Complains of a 4 for day history of bladder spasms, urgency, decreased urine volume, lower abdominal pain, pink tinge to the urine.  No abdominal or back pain. No fever chills.  Some nausea and vomiting.  Has not taken anything over-the-counter.    For her finger pain she started taking Turmeric.  Has not started the steroid pack get.    Health care maintenance.  Mammogram scheduled tomorrow.  Accepts  Pneumovax today.    Review of Systems   Constitutional: Negative for activity change, appetite change, chills and fever.   HENT: Negative for sore throat.    Respiratory: Negative for cough and shortness of breath.    Cardiovascular: Negative for chest pain.   Gastrointestinal: Positive for nausea and vomiting. Negative for abdominal pain and diarrhea.   Genitourinary: Positive for decreased urine volume, difficulty urinating, dysuria, frequency, hematuria and urgency. Negative for flank pain.  "  Musculoskeletal: Negative for arthralgias, back pain and myalgias.   Neurological: Negative for dizziness and headaches.       Objective:      Vitals:    02/24/20 1549   BP: 122/74   Pulse: 83   Temp: 97.5 °F (36.4 °C)   TempSrc: Tympanic   SpO2: 95%   Weight: 102.6 kg (226 lb 3.1 oz)   Height: 5' 5" (1.651 m)   PainSc:   8   PainLoc: Abdomen     Physical Exam   Constitutional: She is oriented to person, place, and time. She appears well-developed and well-nourished. No distress.   HENT:   Head: Normocephalic.   Neck: Neck supple. No thyromegaly present.   Cardiovascular: Normal rate, regular rhythm and normal heart sounds.   No murmur heard.  Pulmonary/Chest: Effort normal and breath sounds normal. She has no wheezes. She has no rales.   Abdominal: Soft. She exhibits no distension and no mass. There is no hepatosplenomegaly. There is tenderness in the suprapubic area. There is no rigidity, no guarding and no CVA tenderness.   Musculoskeletal: She exhibits no edema.   Lymphadenopathy:     She has no cervical adenopathy.   Neurological: She is alert and oriented to person, place, and time.   Skin: Skin is warm and dry. She is not diaphoretic.   Psychiatric: She has a normal mood and affect. Her behavior is normal. Judgment and thought content normal.   Nursing note and vitals reviewed.      Assessment:       1. Acute cystitis with hematuria    2. Need for vaccination    3. Healthcare maintenance        Plan:   Acute cystitis with hematuria  Comments:  Macrobid for 10 days.  Pyridium as needed.  Urine culture sent  Orders:  -     Urine culture; Future; Expected date: 02/24/2020  -     POCT URINE DIPSTICK WITHOUT MICROSCOPE    Need for vaccination  Comments:  Pneumovax today  Orders:  -     Pneumococcal Polysaccharide Vaccine (23 Valent) (SQ/IM)    Healthcare maintenance  Comments:  Mammogram scheduled tomorrow.  Colonoscopy ordered, but not yet scheduled    Other orders  -     nitrofurantoin, " macrocrystal-monohydrate, (MACROBID) 100 MG capsule; Take 1 capsule (100 mg total) by mouth 2 (two) times daily.  Dispense: 20 capsule; Refill: 0  -     phenazopyridine (PYRIDIUM) 200 MG tablet; Take 1 tablet (200 mg total) by mouth 3 (three) times daily as needed for Pain.  Dispense: 6 tablet; Refill: 0

## 2020-02-25 ENCOUNTER — HOSPITAL ENCOUNTER (OUTPATIENT)
Dept: RADIOLOGY | Facility: HOSPITAL | Age: 60
Discharge: HOME | End: 2020-02-25
Attending: FAMILY MEDICINE
Payer: COMMERCIAL

## 2020-02-25 DIAGNOSIS — Z12.39 SCREENING FOR BREAST CANCER: ICD-10-CM

## 2020-02-25 PROCEDURE — 77067 SCR MAMMO BI INCL CAD: CPT | Mod: TC

## 2020-02-25 PROCEDURE — 77067 SCR MAMMO BI INCL CAD: CPT | Mod: 26,,, | Performed by: RADIOLOGY

## 2020-02-25 PROCEDURE — 77063 MAMMO DIGITAL SCREENING BILAT WITH TOMOSYNTHESIS_CAD: ICD-10-PCS | Mod: 26,,, | Performed by: RADIOLOGY

## 2020-02-25 PROCEDURE — 77067 MAMMO DIGITAL SCREENING BILAT WITH TOMOSYNTHESIS_CAD: ICD-10-PCS | Mod: 26,,, | Performed by: RADIOLOGY

## 2020-02-25 PROCEDURE — 77063 BREAST TOMOSYNTHESIS BI: CPT | Mod: 26,,, | Performed by: RADIOLOGY

## 2020-02-26 LAB — BACTERIA UR CULT: ABNORMAL

## 2020-03-06 ENCOUNTER — PATIENT OUTREACH (OUTPATIENT)
Dept: ADMINISTRATIVE | Facility: HOSPITAL | Age: 60
End: 2020-03-06

## 2020-03-06 NOTE — PROGRESS NOTES
[facility] Mammogram Payor Report Audit         Missy CAMEJO LPN Care Coordinator  Care Coordination Department  [facility]  [phone]

## 2020-03-10 ENCOUNTER — PATIENT OUTREACH (OUTPATIENT)
Dept: ADMINISTRATIVE | Facility: HOSPITAL | Age: 60
End: 2020-03-10

## 2020-03-10 DIAGNOSIS — Z90.710 H/O: HYSTERECTOMY: ICD-10-CM

## 2020-03-10 NOTE — PROGRESS NOTES
[facility] Pap audit, Pt has Hysterectomy noted in Chart         Missy CAMEJO LPN Care Coordinator  Care Coordination Department  [facility]  [phone]

## 2020-03-11 ENCOUNTER — PATIENT OUTREACH (OUTPATIENT)
Dept: ADMINISTRATIVE | Facility: OTHER | Age: 60
End: 2020-03-11

## 2020-03-11 DIAGNOSIS — Z12.11 ENCOUNTER FOR FIT (FECAL IMMUNOCHEMICAL TEST) SCREENING: Primary | ICD-10-CM

## 2020-03-11 NOTE — PROGRESS NOTES
Chart Reviewed  Care Everywhere updated  Immunizations reconciled  Health Maintenance updated  Orders placed fit kit  Upcoming Appts: n/a

## 2020-03-19 ENCOUNTER — PATIENT MESSAGE (OUTPATIENT)
Dept: FAMILY MEDICINE | Facility: CLINIC | Age: 60
End: 2020-03-19

## 2020-03-19 RX ORDER — NITROFURANTOIN 25; 75 MG/1; MG/1
100 CAPSULE ORAL 2 TIMES DAILY
Qty: 20 CAPSULE | Refills: 0 | Status: SHIPPED | OUTPATIENT
Start: 2020-03-19 | End: 2020-05-28 | Stop reason: ALTCHOICE

## 2020-05-07 ENCOUNTER — TELEPHONE (OUTPATIENT)
Dept: PULMONOLOGY | Facility: CLINIC | Age: 60
End: 2020-05-07

## 2020-05-19 ENCOUNTER — PATIENT OUTREACH (OUTPATIENT)
Dept: ADMINISTRATIVE | Facility: OTHER | Age: 60
End: 2020-05-19

## 2020-05-19 ENCOUNTER — OFFICE VISIT (OUTPATIENT)
Dept: FAMILY MEDICINE | Facility: CLINIC | Age: 60
End: 2020-05-19
Payer: COMMERCIAL

## 2020-05-19 ENCOUNTER — OFFICE VISIT (OUTPATIENT)
Dept: DERMATOLOGY | Facility: CLINIC | Age: 60
End: 2020-05-19
Payer: COMMERCIAL

## 2020-05-19 VITALS
TEMPERATURE: 98 F | HEIGHT: 65 IN | HEART RATE: 100 BPM | BODY MASS INDEX: 38.02 KG/M2 | DIASTOLIC BLOOD PRESSURE: 83 MMHG | SYSTOLIC BLOOD PRESSURE: 127 MMHG | WEIGHT: 228.19 LBS | OXYGEN SATURATION: 96 %

## 2020-05-19 DIAGNOSIS — L82.0 INFLAMED SEBORRHEIC KERATOSIS: ICD-10-CM

## 2020-05-19 DIAGNOSIS — R21 RASH: ICD-10-CM

## 2020-05-19 DIAGNOSIS — Z72.0 TOBACCO ABUSE: Primary | ICD-10-CM

## 2020-05-19 DIAGNOSIS — R03.0 BLOOD PRESSURE ELEVATED WITHOUT HISTORY OF HTN: ICD-10-CM

## 2020-05-19 DIAGNOSIS — L50.9 URTICARIA: Primary | ICD-10-CM

## 2020-05-19 DIAGNOSIS — Z12.4 CERVICAL CANCER SCREENING: ICD-10-CM

## 2020-05-19 PROCEDURE — 99999 PR PBB SHADOW E&M-EST. PATIENT-LVL III: ICD-10-PCS | Mod: PBBFAC,,, | Performed by: DERMATOLOGY

## 2020-05-19 PROCEDURE — 99999 PR PBB SHADOW E&M-EST. PATIENT-LVL IV: ICD-10-PCS | Mod: PBBFAC,,, | Performed by: FAMILY MEDICINE

## 2020-05-19 PROCEDURE — 99202 PR OFFICE/OUTPT VISIT, NEW, LEVL II, 15-29 MIN: ICD-10-PCS | Mod: 25,S$GLB,, | Performed by: DERMATOLOGY

## 2020-05-19 PROCEDURE — 99999 PR PBB SHADOW E&M-EST. PATIENT-LVL IV: CPT | Mod: PBBFAC,,, | Performed by: FAMILY MEDICINE

## 2020-05-19 PROCEDURE — 99214 PR OFFICE/OUTPT VISIT, EST, LEVL IV, 30-39 MIN: ICD-10-PCS | Mod: S$GLB,,, | Performed by: FAMILY MEDICINE

## 2020-05-19 PROCEDURE — 99999 PR PBB SHADOW E&M-EST. PATIENT-LVL III: CPT | Mod: PBBFAC,,, | Performed by: DERMATOLOGY

## 2020-05-19 PROCEDURE — 17110 PR DESTRUCTION BENIGN LESIONS UP TO 14: ICD-10-PCS | Mod: S$GLB,,, | Performed by: DERMATOLOGY

## 2020-05-19 PROCEDURE — 99214 OFFICE O/P EST MOD 30 MIN: CPT | Mod: S$GLB,,, | Performed by: FAMILY MEDICINE

## 2020-05-19 PROCEDURE — 17110 DESTRUCTION B9 LES UP TO 14: CPT | Mod: S$GLB,,, | Performed by: DERMATOLOGY

## 2020-05-19 PROCEDURE — 99202 OFFICE O/P NEW SF 15 MIN: CPT | Mod: 25,S$GLB,, | Performed by: DERMATOLOGY

## 2020-05-19 RX ORDER — HYDROXYZINE HYDROCHLORIDE 25 MG/1
25 TABLET, FILM COATED ORAL NIGHTLY PRN
Qty: 30 TABLET | Refills: 1 | Status: SHIPPED | OUTPATIENT
Start: 2020-05-19 | End: 2024-01-29

## 2020-05-19 RX ORDER — TRIAMCINOLONE ACETONIDE 1 MG/G
CREAM TOPICAL 2 TIMES DAILY PRN
Qty: 454 G | Refills: 1 | Status: SHIPPED | OUTPATIENT
Start: 2020-05-19 | End: 2024-01-29

## 2020-05-19 NOTE — LETTER
May 19, 2020      Ar Brandt MD  [address] [place] [id number]           [facility]  [address] [address] [facility] [address]  Phone: [phone]  Fax: [phone]          Patient: Sharri House   MR Number: [id number]   YOB: 1960   Date of Visit: 5/19/2020       Dear Dr. Ar Brandt:    Thank you for referring Sharri House to me for evaluation. Attached you will find relevant portions of my assessment and plan of care.    If you have questions, please do not hesitate to call me. I look forward to following Sharri House along with you.    Sincerely,    Andrew Brown MD    Enclosure  CC:  No Recipients    If you would like to receive this communication electronically, please contact [email or web address] or [phone] to request more information on [facility] access.    For providers and/or their staff who would like to refer a patient to [facility], please contact us through our one-stop-shop provider referral line, [facility] , at [phone].    If you feel you have received this communication in error or would no longer like to receive these types of communications, please e-mail [email or web address]

## 2020-05-19 NOTE — PROGRESS NOTES
Subjective:       Patient ID: Sharri House is a 59 y.o. female.    Chief Complaint: Follow-up      HPI Comments:       Current Outpatient Medications:     acetaminophen (TYLENOL) 325 MG tablet, Take 2 tablets (650 mg total) by mouth every 6 (six) hours as needed., Disp: , Rfl: 0    ibuprofen (ADVIL,MOTRIN) 200 MG tablet, Take 200 mg by mouth every 6 (six) hours as needed for Pain., Disp: , Rfl:     methylPREDNISolone (MEDROL DOSEPACK) 4 mg tablet, use as directed, Disp: 1 Package, Rfl: 0    nitrofurantoin, macrocrystal-monohydrate, (MACROBID) 100 MG capsule, Take 1 capsule (100 mg total) by mouth 2 (two) times daily., Disp: 20 capsule, Rfl: 0    pantoprazole (PROTONIX) 40 MG tablet, Take 1 tablet (40 mg total) by mouth once daily. for 14 days (Patient not taking: Reported on 2/18/2020), Disp: 14 tablet, Rfl: 0    Presents with intermittent itchy rash X 6 weeks. Upper chest and neck, back.  Sometimes forehead. No rash below this level. Tried changing out detergent, etc. Antihistamines don't seem to work      S/P hyst. MG normal. Wants to get pap of cuff    Wants to quit smoking. Got down to 2 cigs/d but now back to about 6. Will try low dose patch and return in 3 weeks. Also interested in Chantix.     Review of Systems   Constitutional: Negative for activity change and unexpected weight change.   HENT: Negative for hearing loss, rhinorrhea and trouble swallowing.    Eyes: Negative for discharge and visual disturbance.   Respiratory: Negative for chest tightness and wheezing.    Cardiovascular: Negative for chest pain and palpitations.   Gastrointestinal: Negative for blood in stool, constipation, diarrhea and vomiting.   Endocrine: Negative for polydipsia and polyuria.   Genitourinary: Negative for difficulty urinating, dysuria, hematuria and menstrual problem.   Musculoskeletal: Positive for arthralgias and joint swelling. Negative for neck pain.   Skin: Positive for rash.   Neurological: Negative for  "weakness and headaches.   Psychiatric/Behavioral: Negative for confusion and dysphoric mood.       Objective:      Vitals:    05/19/20 0905 05/19/20 0928   BP: (!) 154/86 127/83   Pulse: 100    Temp: 97.6 °F (36.4 °C)    SpO2: 96%    Weight: 103.5 kg (228 lb 2.8 oz)    Height: 5' 5" (1.651 m)    PainSc: 0-No pain      Physical Exam   Constitutional: She is oriented to person, place, and time. She appears well-developed and well-nourished. No distress.   HENT:   Head: Normocephalic.   Mouth/Throat: No oropharyngeal exudate.   Neck: Neck supple. No thyromegaly present.   Cardiovascular: Normal rate, regular rhythm and normal heart sounds.   No murmur heard.  Pulmonary/Chest: Effort normal and breath sounds normal. She has no wheezes. She has no rales.   Abdominal: Soft. She exhibits no distension.   Musculoskeletal: She exhibits no edema.   Lymphadenopathy:     She has no cervical adenopathy.   Neurological: She is alert and oriented to person, place, and time.   Skin: Skin is warm and dry. She is not diaphoretic.   No significant rash currently. Some excoriations on back   Psychiatric: She has a normal mood and affect. Her behavior is normal. Judgment and thought content normal.   Nursing note and vitals reviewed.      Assessment:       1. Tobacco abuse    2. Rash    3. Cervical cancer screening    4. Blood pressure elevated without history of HTN        Plan:   Tobacco abuse  Comments:  start low dose patch. Recheck progress in 3 weeks    Rash  Comments:  derm consult  Orders:  -     Ambulatory referral/consult to Dermatology; Future; Expected date: 05/26/2020    Cervical cancer screening  Comments:  gyn ref  Orders:  -     Ambulatory referral/consult to Gynecology; Future; Expected date: 05/26/2020    Blood pressure elevated without history of HTN  Comments:  receheck in 3 weeks          "

## 2020-05-19 NOTE — PROGRESS NOTES
Subjective:       Patient ID:  Sharri House is a 59 y.o. female who presents for   Chief Complaint   Patient presents with    Rash     c/o rash to face and neck x 6 wks     History of Present Illness: The patient presents with chief complaint of rash.  Location: face, back, chest and neck  Duration: 6 weeks--comes and goes, individual lesion resolve within hours  Signs/Symptoms: itchy, swollen and red, then leaves scaly feeling behind    Prior treatments: OTC antihistamines     Of note, a few weeks prior to onset patient had UTI treated with antibiotics    Also today, patient c/o growth on right back. It has been present for months to years, growing, itching and tender. No previous treatment.       Review of Systems   Constitutional: Negative for fever and malaise.   Skin: Positive for itching, rash and dry skin.        Objective:    Physical Exam   Constitutional: She appears well-developed and well-nourished. No distress.   Neurological: She is alert and oriented to person, place, and time.   Psychiatric: She has a normal mood and affect.   Skin:   Areas Examined (abnormalities noted in diagram):   Head / Face Inspection Performed  Neck Inspection Performed  Chest / Axilla Inspection Performed  Back Inspection Performed  RUE Inspected  LUE Inspection Performed                  Diagram Legend     Erythematous scaling macule/papule c/w actinic keratosis       Vascular papule c/w angioma      Pigmented verrucoid papule/plaque c/w seborrheic keratosis      Yellow umbilicated papule c/w sebaceous hyperplasia      Irregularly shaped tan macule c/w lentigo     1-2 mm smooth white papules consistent with Milia      Movable subcutaneous cyst with punctum c/w epidermal inclusion cyst      Subcutaneous movable cyst c/w pilar cyst      Firm pink to brown papule c/w dermatofibroma      Pedunculated fleshy papule(s) c/w skin tag(s)      Evenly pigmented macule c/w junctional nevus     Mildly variegated pigmented,  slightly irregular-bordered macule c/w mildly atypical nevus      Flesh colored to evenly pigmented papule c/w intradermal nevus       Pink pearly papule/plaque c/w basal cell carcinoma      Erythematous hyperkeratotic cursted plaque c/w SCC      Surgical scar with no sign of skin cancer recurrence      Open and closed comedones      Inflammatory papules and pustules      Verrucoid papule consistent consistent with wart     Erythematous eczematous patches and plaques     Dystrophic onycholytic nail with subungual debris c/w onychomycosis     Umbilicated papule    Erythematous-base heme-crusted tan verrucoid plaque consistent with inflamed seborrheic keratosis     Erythematous Silvery Scaling Plaque c/w Psoriasis     See annotation      Assessment / Plan:        Urticaria, acute  - may have been precipitated by UTI  - start OTC Allegra 180mg po BID  - start triamcinolone 0.1% cream BID prn flare  - start hydroxyzine 25mg po nightly prn itch  - counseled on gentle skin care, handout and cerave anti-itch samples provided    Inflamed seborrheic keratosis  - reassurance  - cryo x 1 today, right lower back    Cryosurgery procedure note:    Verbal consent from the patient is obtained including, but not limited to, risk of hypopigmentation/hyperpigmentation, scar, recurrence of lesion. Liquid nitrogen cryosurgery is applied to 1 lesions to produce a freeze injury. The patient is aware that blisters may form and is instructed on wound care with gentle cleansing and use of vaseline ointment to keep moist until healed. The patient is supplied a handout on cryosurgery and is instructed to call if lesions do not completely resolve.               Follow up in about 1 month (around 6/19/2020).

## 2020-05-19 NOTE — PATIENT INSTRUCTIONS
**In addition to the medications prescribed, please start taking Allegra 180mg twice daily.     XEROSIS (DRY SKIN)      1. Definition    Xerosis is the term for dry skin.  We all have a natural oil coating over our skin produced by the skin oil glands.  If this oil is removed, the skin becomes dry which can lead to cracking, which can lead to inflammation.  Xerosis is usually a long-term problem that recurs often, especially in the winter.    2. Cause     Long hot baths or showers can remove our natural oil and lead to xerosis.  One should never take more than one bath or shower a day and for no longer than ten minutes.   Use of harsh soaps such as Zest, Dial, Maltese Spring, Lever and Ivory can worsen and cause xerosis.   Cold winter weather worsens xerosis because the amount of moisture contained in cold air is much less than the amount of moisture in warm air.    3. Treatment     Treatment is intended to restore the natural oil to your skin.  Keep the skin lubricated.     Do not take more than one bath or shower a day.  Use lukewarm water, not hot.  Hot water dries out the skin.     Use a gentle moisturizing soap such as Cetaphil soap, Dove, or Cetaphil Restoraderm cleanser.     When toweling dry, dont rub.  Blot the skin so there is still some water left on the skin.  You should apply a moisturizing cream to all of the skin such as Cerave cream, Cetaphil cream, Restoraderm or Eucerin Original Formula cream.   Alpha hydroxyacid lotions, i.e., AmLactin, also work very well for preventing dry skin, but may burn when used on inflamed or reddened skin.        CRYOSURGERY      Your doctor has used a method called cryosurgery to treat your skin condition. Cryosurgery refers to the use of very cold substances to treat a variety of skin conditions such as warts, pre-skin cancers, molluscum contagiosum, sun spots, and several benign growths. The substance we use in cryosurgery is liquid nitrogen and is so cold (-195  degrees Celsius) that is burns when administered.     Following treatment in the office, the skin may immediately burn and become red. You may find the area around the lesion is affected as well. It is sometimes necessary to treat not only the lesion, but a small area of the surrounding normal skin to achieve a good response.     A blister, and even a blood filled blister, may form after treatment.   This is a normal response. If the blister is painful, it is acceptable to sterilize a needle and with rubbing alcohol and gently pop the blister. It is important that you gently wash the area with soap and warm water as the blister fluid may contain wart virus if a wart was treated. Do no remove the roof of the blister.     The area treated can take anywhere from 1-3 weeks to heal. Healing time depends on the kind skin lesion treated, the location, and how aggressively the lesion was treated. It is recommended that the areas treated are covered with Vaseline or bacitracin ointment and a band-aid. If a band-aid is not practical, just ointment applied several times per day will do. Keeping these areas moist will speed the healing time.    Treatment with liquid nitrogen can leave a scar. In dark skin, it may be a light or dark scar, in light skin it may be a white or pink scar. These will generally fade with time, but may never go away completely.     If you have any concerns after your treatment, please feel free to call the office.

## 2020-05-27 ENCOUNTER — TELEPHONE (OUTPATIENT)
Dept: RHEUMATOLOGY | Facility: CLINIC | Age: 60
End: 2020-05-27

## 2020-05-27 ENCOUNTER — OFFICE VISIT (OUTPATIENT)
Dept: OBSTETRICS AND GYNECOLOGY | Facility: CLINIC | Age: 60
End: 2020-05-27
Payer: COMMERCIAL

## 2020-05-27 VITALS
DIASTOLIC BLOOD PRESSURE: 62 MMHG | BODY MASS INDEX: 38.57 KG/M2 | HEIGHT: 65 IN | WEIGHT: 231.5 LBS | SYSTOLIC BLOOD PRESSURE: 126 MMHG

## 2020-05-27 DIAGNOSIS — Z12.4 CERVICAL CANCER SCREENING: ICD-10-CM

## 2020-05-27 DIAGNOSIS — F17.200 SMOKER: Primary | ICD-10-CM

## 2020-05-27 DIAGNOSIS — Z00.00 PREVENTATIVE HEALTH CARE: ICD-10-CM

## 2020-05-27 DIAGNOSIS — Z01.419 GYNECOLOGIC EXAM NORMAL: ICD-10-CM

## 2020-05-27 PROBLEM — Z01.89 NEED FOR ASSESSMENT FOR SLEEP APNEA: Status: RESOLVED | Noted: 2020-02-13 | Resolved: 2020-05-27

## 2020-05-27 PROBLEM — R07.89 ATYPICAL CHEST PAIN: Status: RESOLVED | Noted: 2020-02-03 | Resolved: 2020-05-27

## 2020-05-27 PROCEDURE — 99999 PR PBB SHADOW E&M-EST. PATIENT-LVL III: ICD-10-PCS | Mod: PBBFAC,,, | Performed by: NURSE PRACTITIONER

## 2020-05-27 PROCEDURE — 99999 PR PBB SHADOW E&M-EST. PATIENT-LVL III: CPT | Mod: PBBFAC,,, | Performed by: NURSE PRACTITIONER

## 2020-05-27 PROCEDURE — 99386 PR PREVENTIVE VISIT,NEW,40-64: ICD-10-PCS | Mod: S$GLB,,, | Performed by: NURSE PRACTITIONER

## 2020-05-27 PROCEDURE — 99386 PREV VISIT NEW AGE 40-64: CPT | Mod: S$GLB,,, | Performed by: NURSE PRACTITIONER

## 2020-05-27 RX ORDER — VALACYCLOVIR HYDROCHLORIDE 500 MG/1
500 TABLET, FILM COATED ORAL DAILY
Qty: 30 TABLET | Refills: 12 | Status: SHIPPED | OUTPATIENT
Start: 2020-05-27 | End: 2020-05-28 | Stop reason: ALTCHOICE

## 2020-05-27 NOTE — TELEPHONE ENCOUNTER
Left message to confirm apt for 5.28.20 at 2.15 pm with Dr. Wong at the [facility].

## 2020-05-27 NOTE — LETTER
May 27, 2020      Ar Brandt MD  [address] [facility] [address]           [facility]  [address] [facility] [address]  Phone: [phone]  Fax: [phone]          Patient: Sharri House   MR Number: [id number]   YOB: 1960   Date of Visit: 5/27/2020       Dear Dr. Ar Brandt:    Thank you for referring Sharri House to me for evaluation. Attached you will find relevant portions of my assessment and plan of care.    If you have questions, please do not hesitate to call me. I look forward to following Sharri House along with you.    Sincerely,    More Pratt NP    Enclosure  CC:  No Recipients    If you would like to receive this communication electronically, please contact [email or web address] or [phone] to request more information on [facility] Link access.    For providers and/or their staff who would like to refer a patient to [facility], please contact us through our one-stop-shop provider referral line, [facility], at [phone].    If you feel you have received this communication in error or would no longer like to receive these types of communications, please e-mail [email or web address]

## 2020-05-27 NOTE — PROGRESS NOTES
"CC: Well woman exam    Sharri House is a 59 y.o. female  presents for a well woman exam.  No issues, problems, or complaints.Patient is sexually active, no issues. S/P benign hysterectomy with ovary conservation. Patient has a long h/o smoking and " was told that she is at risk for bone loss". Last mammogram,, was normal. No reported vasomotor symptoms.  H/O HSV, several outbreaks a year.     Past Medical History:   Diagnosis Date    GERD (gastroesophageal reflux disease)      Past Surgical History:   Procedure Laterality Date    ABDOMINAL SURGERY       SECTION      HIP SURGERY      HYSTERECTOMY       No family history on file.  Social History     Tobacco Use    Smoking status: Current Every Day Smoker     Packs/day: 0.25     Types: Cigarettes    Smokeless tobacco: Never Used   Substance Use Topics    Alcohol use: Yes     Frequency: 2-3 times a week     Drinks per session: 1 or 2     Binge frequency: Never     Comment: occasional     Drug use: Never     OB History        6    Para        Term                AB   1    Living   5       SAB   1    TAB        Ectopic        Multiple        Live Births   5                 /62   Ht 5' 5" (1.651 m)   Wt 105 kg (231 lb 7.7 oz)   BMI 38.52 kg/m²     ROS:  GENERAL: Denies weight gain or weight loss. Feeling well overall.   SKIN: Denies rash or lesions.   HEAD: Denies head injury or headache.   NODES: Denies enlarged lymph nodes.   CHEST: Denies chest pain or shortness of breath.   CARDIOVASCULAR: Denies palpitations or left sided chest pain.   ABDOMEN: No abdominal pain, constipation, diarrhea, nausea, vomiting or rectal bleeding.   URINARY: No frequency, dysuria, hematuria, or burning on urination.  REPRODUCTIVE: See HPI.   BREASTS: The patient performs breast self-examination and denies pain, lumps, or nipple discharge.   HEMATOLOGIC: No easy bruisability or excessive bleeding.   MUSCULOSKELETAL: Denies joint pain or " swelling.   NEUROLOGIC: Denies syncope or weakness.   PSYCHIATRIC: Denies depression, anxiety or mood swings.    PE:   APPEARANCE: Obese female, in no acute distress.  AFFECT: WNL, alert and oriented x 3.  SKIN: No acne or hirsutism.  NECK: Neck symmetric without masses or thyromegaly.  NODES: No inguinal, cervical, axillary or femoral lymph node enlargement.  CHEST: Good respiratory effort.   ABDOMEN: Soft. No tenderness or masses. No hepatosplenomegaly. No hernias.  BREASTS: Symmetrical, no skin changes or visible lesions. No palpable masses, nipple discharge bilaterally.  PELVIC: Normal external female genitalia without lesions. Normal hair distribution. Adequate perineal body, normal urethral meatus. Vagina atrophic without lesions or discharge. No significant cystocele or rectocele. Bimanual exam shows uterus and cervix to be surgically absent. Adnexa without masses or tenderness.  RECTAL: Rectovaginal exam confirms above with normal sphincter tone, no masses.  EXTREMITIES: No edema.    1. Smoker  DXA Bone Density Spine And Hip   2. Gynecologic exam normal  DXA Bone Density Spine And Hip   3. Preventative health care  DXA Bone Density Spine And Hip   4. Cervical cancer screening  Ambulatory referral/consult to Gynecology    gyn ref     PLAN:  Dexa scan  Discussed the need to improve adherence to exercise and low carb diet  Valtrex rx for daily suppression   Patient was counseled today on A.C.S. Pap guidelines and recommendations for yearly pelvic exams, mammograms and monthly self breast exams; to see her PCP for other health maintenance.

## 2020-05-28 ENCOUNTER — OFFICE VISIT (OUTPATIENT)
Dept: RHEUMATOLOGY | Facility: CLINIC | Age: 60
End: 2020-05-28
Payer: COMMERCIAL

## 2020-05-28 ENCOUNTER — HOSPITAL ENCOUNTER (OUTPATIENT)
Dept: RADIOLOGY | Facility: HOSPITAL | Age: 60
Discharge: HOME | End: 2020-05-28
Attending: INTERNAL MEDICINE
Payer: COMMERCIAL

## 2020-05-28 ENCOUNTER — PATIENT OUTREACH (OUTPATIENT)
Dept: ADMINISTRATIVE | Facility: OTHER | Age: 60
End: 2020-05-28

## 2020-05-28 VITALS
DIASTOLIC BLOOD PRESSURE: 80 MMHG | HEART RATE: 75 BPM | BODY MASS INDEX: 38.09 KG/M2 | SYSTOLIC BLOOD PRESSURE: 130 MMHG | WEIGHT: 228.63 LBS | HEIGHT: 65 IN

## 2020-05-28 DIAGNOSIS — R60.0 PEDAL EDEMA: ICD-10-CM

## 2020-05-28 DIAGNOSIS — M54.41 CHRONIC MIDLINE LOW BACK PAIN WITH RIGHT-SIDED SCIATICA: ICD-10-CM

## 2020-05-28 DIAGNOSIS — G89.29 CHRONIC MIDLINE LOW BACK PAIN WITH RIGHT-SIDED SCIATICA: ICD-10-CM

## 2020-05-28 DIAGNOSIS — M15.9 PRIMARY OSTEOARTHRITIS INVOLVING MULTIPLE JOINTS: Primary | ICD-10-CM

## 2020-05-28 PROBLEM — M15.0 PRIMARY OSTEOARTHRITIS INVOLVING MULTIPLE JOINTS: Status: ACTIVE | Noted: 2020-05-28

## 2020-05-28 PROCEDURE — 99999 PR PBB SHADOW E&M-EST. PATIENT-LVL III: CPT | Mod: PBBFAC,,, | Performed by: INTERNAL MEDICINE

## 2020-05-28 PROCEDURE — 99214 PR OFFICE/OUTPT VISIT, EST, LEVL IV, 30-39 MIN: ICD-10-PCS | Mod: S$GLB,,, | Performed by: INTERNAL MEDICINE

## 2020-05-28 PROCEDURE — 72100 X-RAY EXAM L-S SPINE 2/3 VWS: CPT | Mod: TC

## 2020-05-28 PROCEDURE — 72100 X-RAY EXAM L-S SPINE 2/3 VWS: CPT | Mod: 26,,, | Performed by: RADIOLOGY

## 2020-05-28 PROCEDURE — 99214 OFFICE O/P EST MOD 30 MIN: CPT | Mod: S$GLB,,, | Performed by: INTERNAL MEDICINE

## 2020-05-28 PROCEDURE — 99999 PR PBB SHADOW E&M-EST. PATIENT-LVL III: ICD-10-PCS | Mod: PBBFAC,,, | Performed by: INTERNAL MEDICINE

## 2020-05-28 PROCEDURE — 72100 XR LUMBAR SPINE AP AND LATERAL: ICD-10-PCS | Mod: 26,,, | Performed by: RADIOLOGY

## 2020-05-28 RX ORDER — FUROSEMIDE 20 MG/1
20 TABLET ORAL DAILY
Qty: 30 TABLET | Refills: 2 | Status: SHIPPED | OUTPATIENT
Start: 2020-05-28 | End: 2020-09-18 | Stop reason: SDUPTHER

## 2020-05-28 RX ORDER — DULOXETIN HYDROCHLORIDE 30 MG/1
30 CAPSULE, DELAYED RELEASE ORAL DAILY
Qty: 30 CAPSULE | Refills: 11 | Status: SHIPPED | OUTPATIENT
Start: 2020-05-28 | End: 2024-01-29 | Stop reason: SDUPTHER

## 2020-05-29 ENCOUNTER — PATIENT MESSAGE (OUTPATIENT)
Dept: PAIN MEDICINE | Facility: CLINIC | Age: 60
End: 2020-05-29

## 2020-05-29 ENCOUNTER — OFFICE VISIT (OUTPATIENT)
Dept: PAIN MEDICINE | Facility: CLINIC | Age: 60
End: 2020-05-29
Payer: COMMERCIAL

## 2020-05-29 VITALS
WEIGHT: 226 LBS | HEART RATE: 89 BPM | SYSTOLIC BLOOD PRESSURE: 147 MMHG | HEIGHT: 65 IN | DIASTOLIC BLOOD PRESSURE: 94 MMHG | BODY MASS INDEX: 37.65 KG/M2

## 2020-05-29 DIAGNOSIS — M54.41 CHRONIC MIDLINE LOW BACK PAIN WITH RIGHT-SIDED SCIATICA: Primary | ICD-10-CM

## 2020-05-29 DIAGNOSIS — G89.29 CHRONIC MIDLINE LOW BACK PAIN WITH RIGHT-SIDED SCIATICA: Primary | ICD-10-CM

## 2020-05-29 DIAGNOSIS — M47.816 LUMBAR SPONDYLOSIS: ICD-10-CM

## 2020-05-29 DIAGNOSIS — M54.16 LUMBAR RADICULOPATHY: ICD-10-CM

## 2020-05-29 PROCEDURE — 99204 OFFICE O/P NEW MOD 45 MIN: CPT | Mod: S$GLB,,, | Performed by: PAIN MEDICINE

## 2020-05-29 PROCEDURE — 99204 PR OFFICE/OUTPT VISIT, NEW, LEVL IV, 45-59 MIN: ICD-10-PCS | Mod: S$GLB,,, | Performed by: PAIN MEDICINE

## 2020-05-29 PROCEDURE — 99999 PR PBB SHADOW E&M-EST. PATIENT-LVL IV: ICD-10-PCS | Mod: PBBFAC,,, | Performed by: PAIN MEDICINE

## 2020-05-29 PROCEDURE — 99999 PR PBB SHADOW E&M-EST. PATIENT-LVL IV: CPT | Mod: PBBFAC,,, | Performed by: PAIN MEDICINE

## 2020-05-29 NOTE — PATIENT INSTRUCTIONS
-continue all medications as prescribed  -will provide referral for physical therapy  -obtain updated lumbar MRI  -patient can use topical Voltaren gel over-the-counter  -follow up in clinic in 6 weeks for lumbar MRI review
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
## 2020-05-29 NOTE — ASSESSMENT & PLAN NOTE
Mild pitting pedal edema.  Try low-dose Lasix.  Advised to take over-the-counter potassium supplement to avoid low potassium problems

## 2020-05-29 NOTE — PROGRESS NOTES
RHEUMATOLOGY CLINIC FOLLOW UP VISIT  Chief complaints:-  My legs are swollen.  Back and legs hurt within few min of walking.    HPI:-  Sharri Gannon a 59 y.o. pleasant female comes in for a follow up visit.  Chronic achy progressive intermittent episodic pain over large joints and lower back associated with numbness tingling pain of both legs.  No associated bladder or bowel incontinence.  Edema of bilateral lower extremities usually at the end of the day completely resolved when she wakes up in the morning.    Review of Systems   Constitutional: Negative for chills and fever.   HENT: Negative for congestion and sore throat.    Eyes: Negative for blurred vision and redness.   Respiratory: Negative for cough and shortness of breath.    Cardiovascular: Negative for chest pain and leg swelling.   Gastrointestinal: Negative for abdominal pain.   Genitourinary: Negative for dysuria.   Musculoskeletal: Positive for back pain, joint pain, myalgias and neck pain. Negative for falls.   Skin: Negative for rash.   Neurological: Negative for headaches.   Endo/Heme/Allergies: Does not bruise/bleed easily.   Psychiatric/Behavioral: Negative for memory loss. The patient does not have insomnia.        Past Medical History:   Diagnosis Date    GERD (gastroesophageal reflux disease)        Past Surgical History:   Procedure Laterality Date    ABDOMINAL SURGERY       SECTION      HIP SURGERY      HYSTERECTOMY          Social History     Tobacco Use    Smoking status: Current Every Day Smoker     Packs/day: 0.25     Types: Cigarettes    Smokeless tobacco: Never Used   Substance Use Topics    Alcohol use: Yes     Frequency: 2-3 times a week     Drinks per session: 1 or 2     Binge frequency: Never     Comment: occasional     Drug use: Never       History reviewed. No pertinent family history.    Review of patient's allergies indicates:  No Known  "Allergies    Vitals:    05/28/20 1357   BP: 130/80   Pulse: 75   Weight: 103.7 kg (228 lb 9.9 oz)   Height: 5' 5" (1.651 m)   PainSc:   7   PainLoc: Generalized       Physical Exam   Constitutional: She is oriented to person, place, and time and well-developed, well-nourished, and in no distress. No distress.   HENT:   Head: Normocephalic.   Mouth/Throat: Oropharynx is clear and moist.   Eyes: Pupils are equal, round, and reactive to light. Conjunctivae and EOM are normal.   Neck: Normal range of motion. Neck supple.   Cardiovascular: Normal rate and intact distal pulses.   Pitting pedal edema around ankles.    Pulmonary/Chest: Effort normal. No respiratory distress.   Abdominal: Soft. There is no tenderness.   Musculoskeletal:   Few tender points.  No synovitis over small joints of hands or feet.  Crepitus but No effusion over large joints.  Tender lower back.   Neurological: She is alert and oriented to person, place, and time. No cranial nerve deficit.   Skin: Skin is warm. No rash noted. No erythema.   Psychiatric: Mood and affect normal.   Nursing note and vitals reviewed.      Medication List with Changes/Refills   New Medications    DULOXETINE (CYMBALTA) 30 MG CAPSULE    Take 1 capsule (30 mg total) by mouth once daily.    FUROSEMIDE (LASIX) 20 MG TABLET    Take 1 tablet (20 mg total) by mouth once daily.   Current Medications    HYDROXYZINE HCL (ATARAX) 25 MG TABLET    Take 1 tablet (25 mg total) by mouth nightly as needed for Itching.    IBUPROFEN (ADVIL,MOTRIN) 200 MG TABLET    Take 200 mg by mouth every 6 (six) hours as needed for Pain.    TRIAMCINOLONE ACETONIDE 0.1% (KENALOG) 0.1 % CREAM    Apply topically 2 (two) times daily as needed. For itch.   Discontinued Medications    ACETAMINOPHEN (TYLENOL) 325 MG TABLET    Take 2 tablets (650 mg total) by mouth every 6 (six) hours as needed.    METHYLPREDNISOLONE (MEDROL DOSEPACK) 4 MG TABLET    use as directed    NITROFURANTOIN, MACROCRYSTAL-MONOHYDRATE, " (MACROBID) 100 MG CAPSULE    Take 1 capsule (100 mg total) by mouth 2 (two) times daily.    PANTOPRAZOLE (PROTONIX) 40 MG TABLET    Take 1 tablet (40 mg total) by mouth once daily. for 14 days    VALACYCLOVIR (VALTREX) 500 MG TABLET    Take 1 tablet (500 mg total) by mouth once daily.       Assessment/Plans:-  1. Primary osteoarthritis involving multiple joints    2. Pedal edema    3. Chronic midline low back pain with right-sided sciatica      Problem List Items Addressed This Visit        Orthopedic    Primary osteoarthritis involving multiple joints - Primary    Current Assessment & Plan     Try Cymbalta.  Not responding to conservative therapy.         Relevant Medications    DULoxetine (CYMBALTA) 30 MG capsule    Chronic midline low back pain with right-sided sciatica    Current Assessment & Plan     Referral sent to pain specialist for interventional therapy.         Relevant Orders    X-Ray Lumbar Spine AP And Lateral (Completed)    Ambulatory referral/consult to Pain Clinic       Other    Pedal edema    Current Assessment & Plan     Mild pitting pedal edema.  Try low-dose Lasix.  Advised to take over-the-counter potassium supplement to avoid low potassium problems         Relevant Medications    furosemide (LASIX) 20 MG tablet        # Follow up in about 2 months (around 7/28/2020).      Disclaimer: This note was prepared using voice recognition system and is likely to have sound alike errors and is not proof read.  Please call me with any questions.

## 2020-06-08 ENCOUNTER — CLINICAL SUPPORT (OUTPATIENT)
Dept: REHABILITATION | Facility: HOSPITAL | Age: 60
End: 2020-06-08
Attending: PAIN MEDICINE
Payer: COMMERCIAL

## 2020-06-08 DIAGNOSIS — M47.816 LUMBAR SPONDYLOSIS: ICD-10-CM

## 2020-06-08 PROCEDURE — 97161 PT EVAL LOW COMPLEX 20 MIN: CPT

## 2020-06-08 PROCEDURE — 97110 THERAPEUTIC EXERCISES: CPT
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
## 2020-06-09 ENCOUNTER — LAB VISIT (OUTPATIENT)
Dept: LAB | Facility: HOSPITAL | Age: 60
End: 2020-06-09
Attending: FAMILY MEDICINE
Payer: COMMERCIAL

## 2020-06-09 ENCOUNTER — OFFICE VISIT (OUTPATIENT)
Dept: FAMILY MEDICINE | Facility: CLINIC | Age: 60
End: 2020-06-09
Payer: COMMERCIAL

## 2020-06-09 VITALS
HEART RATE: 88 BPM | TEMPERATURE: 96 F | OXYGEN SATURATION: 96 % | BODY MASS INDEX: 37.37 KG/M2 | WEIGHT: 224.56 LBS | SYSTOLIC BLOOD PRESSURE: 124 MMHG | DIASTOLIC BLOOD PRESSURE: 64 MMHG

## 2020-06-09 DIAGNOSIS — R60.0 PERIPHERAL EDEMA: ICD-10-CM

## 2020-06-09 DIAGNOSIS — Z72.0 TOBACCO ABUSE: ICD-10-CM

## 2020-06-09 DIAGNOSIS — M54.16 LUMBAR RADICULOPATHY: ICD-10-CM

## 2020-06-09 DIAGNOSIS — L02.419 AXILLARY ABSCESS: Primary | ICD-10-CM

## 2020-06-09 DIAGNOSIS — Z23 NEED FOR VACCINATION: ICD-10-CM

## 2020-06-09 DIAGNOSIS — M15.9 PRIMARY OSTEOARTHRITIS INVOLVING MULTIPLE JOINTS: ICD-10-CM

## 2020-06-09 DIAGNOSIS — R03.0 BLOOD PRESSURE ELEVATED WITHOUT HISTORY OF HTN: ICD-10-CM

## 2020-06-09 LAB
ANION GAP SERPL CALC-SCNC: 12 MMOL/L (ref 8–16)
BUN SERPL-MCNC: 12 MG/DL (ref 6–20)
CALCIUM SERPL-MCNC: 9.6 MG/DL (ref 8.7–10.5)
CHLORIDE SERPL-SCNC: 102 MMOL/L (ref 95–110)
CO2 SERPL-SCNC: 24 MMOL/L (ref 23–29)
CREAT SERPL-MCNC: 0.9 MG/DL (ref 0.5–1.4)
EST. GFR  (AFRICAN AMERICAN): >60 ML/MIN/1.73 M^2
EST. GFR  (NON AFRICAN AMERICAN): >60 ML/MIN/1.73 M^2
GLUCOSE SERPL-MCNC: 113 MG/DL (ref 70–110)
POTASSIUM SERPL-SCNC: 4.6 MMOL/L (ref 3.5–5.1)
SODIUM SERPL-SCNC: 138 MMOL/L (ref 136–145)

## 2020-06-09 PROCEDURE — 99214 PR OFFICE/OUTPT VISIT, EST, LEVL IV, 30-39 MIN: ICD-10-PCS | Mod: 25,S$GLB,, | Performed by: FAMILY MEDICINE

## 2020-06-09 PROCEDURE — 99999 PR PBB SHADOW E&M-EST. PATIENT-LVL III: ICD-10-PCS | Mod: PBBFAC,,, | Performed by: FAMILY MEDICINE

## 2020-06-09 PROCEDURE — 36415 COLL VENOUS BLD VENIPUNCTURE: CPT | Mod: PO

## 2020-06-09 PROCEDURE — 99999 PR PBB SHADOW E&M-EST. PATIENT-LVL III: CPT | Mod: PBBFAC,,, | Performed by: FAMILY MEDICINE

## 2020-06-09 PROCEDURE — 90715 TDAP VACCINE 7 YRS/> IM: CPT | Mod: S$GLB,,, | Performed by: FAMILY MEDICINE

## 2020-06-09 PROCEDURE — 99214 OFFICE O/P EST MOD 30 MIN: CPT | Mod: 25,S$GLB,, | Performed by: FAMILY MEDICINE

## 2020-06-09 PROCEDURE — 90715 TDAP VACCINE GREATER THAN OR EQUAL TO 7YO IM: ICD-10-PCS | Mod: S$GLB,,, | Performed by: FAMILY MEDICINE

## 2020-06-09 PROCEDURE — 90471 IMMUNIZATION ADMIN: CPT | Mod: S$GLB,,, | Performed by: FAMILY MEDICINE

## 2020-06-09 PROCEDURE — 80048 BASIC METABOLIC PNL TOTAL CA: CPT

## 2020-06-09 PROCEDURE — 90471 TDAP VACCINE GREATER THAN OR EQUAL TO 7YO IM: ICD-10-PCS | Mod: S$GLB,,, | Performed by: FAMILY MEDICINE

## 2020-06-09 RX ORDER — SULFAMETHOXAZOLE AND TRIMETHOPRIM 800; 160 MG/1; MG/1
1 TABLET ORAL 2 TIMES DAILY
Qty: 14 TABLET | Refills: 0 | Status: SHIPPED | OUTPATIENT
Start: 2020-06-09 | End: 2020-07-17

## 2020-06-09 NOTE — PROGRESS NOTES
Subjective:       Patient ID: Sharri House is a 59 y.o. female.    Chief Complaint: Follow-up; Leg Swelling; and Abscess (left armpit, drained this morning. )      HPI Comments:       Current Outpatient Medications:     DULoxetine (CYMBALTA) 30 MG capsule, Take 1 capsule (30 mg total) by mouth once daily., Disp: 30 capsule, Rfl: 11    furosemide (LASIX) 20 MG tablet, Take 1 tablet (20 mg total) by mouth once daily., Disp: 30 tablet, Rfl: 2    hydroxyzine HCL (ATARAX) 25 MG tablet, Take 1 tablet (25 mg total) by mouth nightly as needed for Itching., Disp: 30 tablet, Rfl: 1    ibuprofen (ADVIL,MOTRIN) 200 MG tablet, Take 200 mg by mouth every 6 (six) hours as needed for Pain., Disp: , Rfl:     potassium 99 mg Tab, Take 1 tablet by mouth once daily., Disp: , Rfl:     triamcinolone acetonide 0.1% (KENALOG) 0.1 % cream, Apply topically 2 (two) times daily as needed. For itch., Disp: 454 g, Rfl: 1    sulfamethoxazole-trimethoprim 800-160mg (BACTRIM DS) 800-160 mg Tab, Take 1 tablet by mouth 2 (two) times daily., Disp: 14 tablet, Rfl: 0      Here for 3 week follow-up on blood pressure.  She has had multiple readings outside of this office in 3 of the for have been normal.  The elevated pressure corresponded with some increase in back pain during that visit.  Blood pressure today is normal as well.    Did not tried nicotine patches yet.  Did not really have a reason.    Ordered colonoscopy in February.  Never heard from them.  Will have her call.    Recently started on Cymbalta by rheumatology.  Seems to be helping the pain in her hands and feet.  Also her mood and anxiety improving.  Started PT yesterday for her back.  Sees the management in again in few weeks    Today complains of a few day history of a boil on her left armpit.   has been battling with MRSA recently, but this is her 1st time.  Today came to a head and she was able to express a lot of pus from it.    Significant amount of edema in her  feet and ankles at the end of the day.  Rheumatology started on Lasix 20 mg a day.  This seems to be really help being to prevent it.  Today we discussed this is likely manifestation venous insufficiency.  Check chemistry profile today.  Has already been tested for HIV in the past.    Review of Systems   Constitutional: Negative for activity change, appetite change and fever.   HENT: Negative for sore throat.    Respiratory: Negative for cough and shortness of breath.    Cardiovascular: Positive for leg swelling. Negative for chest pain.   Gastrointestinal: Negative for abdominal pain, diarrhea and nausea.   Genitourinary: Negative for difficulty urinating.   Musculoskeletal: Positive for arthralgias. Negative for myalgias.   Skin: Positive for wound.   Neurological: Negative for dizziness and headaches.       Objective:      Vitals:    06/09/20 0836   BP: 124/64   Pulse: 88   Temp: 96 °F (35.6 °C)   TempSrc: Tympanic   SpO2: 96%   Weight: 101.9 kg (224 lb 8.6 oz)   PainSc: 10-Worst pain ever   PainLoc: Arm     Physical Exam   Constitutional: She is oriented to person, place, and time. She appears well-developed and well-nourished. No distress.   HENT:   Head: Normocephalic.   Mouth/Throat: No oropharyngeal exudate.   Neck: Neck supple. No thyromegaly present.   Cardiovascular: Normal rate, regular rhythm and normal heart sounds.   No murmur heard.  Pulmonary/Chest: Effort normal and breath sounds normal. She has no wheezes. She has no rales.   Left axilla with a partially evacuated abscess.  Minimally tender.  3 x 2 cm.  Nonfluctuant   Abdominal: Soft. She exhibits no distension.   Musculoskeletal: She exhibits no edema.   No edema currently   Lymphadenopathy:     She has no cervical adenopathy.   Neurological: She is alert and oriented to person, place, and time.   Skin: Skin is warm and dry. She is not diaphoretic.   Psychiatric: She has a normal mood and affect. Her behavior is normal. Judgment and thought  content normal.   Nursing note and vitals reviewed.    yes Tdap   Assessment:       1. Axillary abscess    2. Tobacco abuse    3. Blood pressure elevated without history of HTN    4. Lumbar radiculopathy    5. Primary osteoarthritis involving multiple joints    6. Need for vaccination    7. Peripheral edema        Plan:   Axillary abscess  Comments:  Draining.  Add Bactrim.  Warm compresses.  Follow-up instructions given    Tobacco abuse  Comments:  Wants to continue smoking for now    Blood pressure elevated without history of HTN  Comments:  Normal.  Follow-up 3 months    Lumbar radiculopathy  Comments:  Followed by pain management.  Now getting PT    Primary osteoarthritis involving multiple joints  Comments:  Improving with Cymbalta  Orders:  -     Basic metabolic panel; Future; Expected date: 06/09/2020    Need for vaccination  Comments:  Tdap today.  Discussed shingles vaccine  Orders:  -     Tdap Vaccine    Peripheral edema  Comments:  Likely due to venous insufficiency.  Continue Lasix.  BMP today    Other orders  -     sulfamethoxazole-trimethoprim 800-160mg (BACTRIM DS) 800-160 mg Tab; Take 1 tablet by mouth 2 (two) times daily.  Dispense: 14 tablet; Refill: 0

## 2020-06-10 ENCOUNTER — CLINICAL SUPPORT (OUTPATIENT)
Dept: REHABILITATION | Facility: HOSPITAL | Age: 60
End: 2020-06-10
Attending: PAIN MEDICINE
Payer: COMMERCIAL

## 2020-06-10 ENCOUNTER — TELEPHONE (OUTPATIENT)
Dept: FAMILY MEDICINE | Facility: CLINIC | Age: 60
End: 2020-06-10

## 2020-06-10 DIAGNOSIS — R29.898 WEAKNESS OF BOTH LOWER EXTREMITIES: ICD-10-CM

## 2020-06-10 DIAGNOSIS — M53.86 DECREASED ROM OF LUMBAR SPINE: ICD-10-CM

## 2020-06-10 PROCEDURE — 97110 THERAPEUTIC EXERCISES: CPT

## 2020-06-10 NOTE — PROGRESS NOTES
Physical Therapy Treatment Note     Name: Sharri Condon [facility] Number: [id number]    Therapy Diagnosis:   Encounter Diagnoses   Name Primary?    Decreased ROM of lumbar spine     Weakness of both lower extremities      Physician: Adarsh Barreto MD    Visit Date: 6/10/2020    Physician Orders: PT Eval and Treat   Medical Diagnosis from Referral: M47.816 (ICD-10-CM) - Lumbar spondylosis    Evaluation Date: 6/8/2020  Authorization Period Expiration: 12/31/2020  Plan of Care Expiration: 07/20/2020  Visit # / Visits authorized: 2/ 30    Time In: 12:25  Time Out: 1:20  Total Billable Time: 45 minutes    Precautions: Standard    Subjective     Pt reports: her low back bothering her and cannot lift her left leg up high. Minimal soreness today.  She was compliant with home exercise program.  Response to previous treatment: minimal soreness  Functional change: no significant change since initial evaluation    Pain: 5/10  Location: bilateral back left > right      Objective     Sharri received therapeutic exercises to develop strength, endurance, ROM, flexibility, posture and core stabilization for 45 minutes including:  Nustep 5 min  LTR 2 min  DKTC with ball 2 min  Seated hamstring stretch 30 sec 3x BLE  Hip adduction iso with ball 5 sec hold, 2 min  Hip abduction iso with strap 5 sec hold, 2 min  Modified piriformis stretch 30 sec hold 3x  Standing hip flexion, abduction and extension 3 x 10 BLE    Sharri received the following manual therapy techniques: Joint mobilizations, Manual traction and Soft tissue Mobilization were applied to the: low back for 0 minutes, including:    Sharri received the following supervised modalities after being cleared for contradictions: IFC Electrical Stimulation:  Sharri received IFC Electrical Stimulation for pain control applied to the low back. Pt received stimulation for 0 minutes. Sharri tolderated treatment well without any adverse effects.      Sharri received hot pack for 10  minutes to low back.    Home Exercises Provided and Patient Education Provided     Education provided:   - postural education and breathing techniques    Written Home Exercises Provided: Patient instructed to cont prior HEP.  Exercises were reviewed and Sharri was able to demonstrate them prior to the end of the session.  Sharri demonstrated good  understanding of the education provided.     See EMR under Patient Instructions for exercises provided prior visit.    Assessment   Patient presents mild antalgic gait pattern, she also demonstrates LLD in which contributes to her abnormal gait pattern. She was able to tolerate all therex well, however patient had increase pain and end field with left hip flexion as well decreased strength on LLE. Patient required modified piriformis stretch on LLE. She will continue to benefit from further PT.    Sharri is progressing well towards her goals.   Pt prognosis is Good.     Pt will continue to benefit from skilled outpatient physical therapy to address the deficits listed in the problem list box on initial evaluation, provide pt/family education and to maximize pt's level of independence in the home and community environment.     Pt's spiritual, cultural and educational needs considered and pt agreeable to plan of care and goals.     Anticipated barriers to physical therapy: none    Goals: Goals:  Short Term Goals: 3 weeks   1. Pt will be 50% independent with HEP.  2. Pt will be able to walk 2 blocks without difficulty.  3. Pt will be able to lift 5 lbs without difficulty.     Long Term Goals: 6 weeks   1. Pt will be 100% independent with HEP.  2. Pt will be able to walk 1/2 mile without difficulty.  3. Pt will be able to increase the strength in her back and core to at least 4-/5.  4. Pt will be able to walk around [employer] without difficulty.  5. Pt will be able to drive for 1/2 hour without any increased pain or symptoms.     Plan       Monitor response to tx and progress with  PT POC as indicated.      Sia Garcia, PT

## 2020-06-15 ENCOUNTER — CLINICAL SUPPORT (OUTPATIENT)
Dept: REHABILITATION | Facility: HOSPITAL | Age: 60
End: 2020-06-15
Attending: PAIN MEDICINE
Payer: COMMERCIAL

## 2020-06-15 DIAGNOSIS — M53.86 DECREASED ROM OF LUMBAR SPINE: ICD-10-CM

## 2020-06-15 DIAGNOSIS — R29.898 WEAKNESS OF BOTH LOWER EXTREMITIES: ICD-10-CM

## 2020-06-15 PROCEDURE — 97014 ELECTRIC STIMULATION THERAPY: CPT

## 2020-06-15 PROCEDURE — 97110 THERAPEUTIC EXERCISES: CPT

## 2020-06-15 NOTE — PROGRESS NOTES
Physical Therapy Treatment Note     Name: Sharri Condno [facility] Number: [id number]    Therapy Diagnosis:   Encounter Diagnoses   Name Primary?    Decreased ROM of lumbar spine     Weakness of both lower extremities      Physician: Adarsh Barreto MD    Visit Date: 6/15/2020    Physician Orders: PT Eval and Treat   Medical Diagnosis from Referral: M47.816 (ICD-10-CM) - Lumbar spondylosis    Evaluation Date: 6/8/2020  Authorization Period Expiration: 12/31/2020  Plan of Care Expiration: 07/20/2020  Visit # / Visits authorized: 3/30    Time In: 12:45  Time Out: 13:40  Total Billable Time: 45 minutes    Precautions: Standard    Subjective     Pt reports: her low back bothering her and cannot lift her left leg up high. Minimal soreness today.  She was compliant with home exercise program.  Response to previous treatment: minimal soreness  Functional change: no significant change since initial evaluation    Pain: 5/10  Location: bilateral back left > right      Objective     Sharri received therapeutic exercises to develop strength, endurance, ROM, flexibility, posture and core stabilization for 30 minutes including:  Nustep 6 min  LTR 2 min  DKTC with ball 2 min  Seated hamstring stretch 30 sec 3x BLE  Hip adduction iso with ball 5 sec hold, 2 min-NP  Hip abduction iso with strap 5 sec hold, 2 min-NP  Modified piriformis stretch 30 sec hold 3x  Standing hip flexion, abduction and extension 3 x 10 BLE      Palpation Assessment to determine the necessity for Functional Dry Needling to lumbar paraspinals on either side of the lumbar spine   Patient provided written and verbal consent to Functional Dry Needling in the chart.    Patient demonstrated appropriate response to Functional Dry Needling x 5 min Fair  rhythmical contractions observed with estim to treated muscle groups    Sharri received the following manual therapy techniques: Joint mobilizations, Manual traction and Soft tissue Mobilization were applied to  the: low back for 0 minutes, including:    Sharri received the following supervised modalities after being cleared for contradictions: IFC Electrical Stimulation:  Sharri received IFC Electrical Stimulation for pain control applied to the low back. Pt received stimulation for 10 minutes. Sharri tolderated treatment well without any adverse effects.      Sharri received hot pack for 10 minutes to low back.    Home Exercises Provided and Patient Education Provided     Education provided:   - postural education and breathing techniques    Written Home Exercises Provided: Patient instructed to cont prior HEP.  Exercises were reviewed and Sharri was able to demonstrate them prior to the end of the session.  Sharri demonstrated good  understanding of the education provided.     See EMR under Patient Instructions for exercises provided prior visit.    Assessment   Patient presents mild antalgic gait pattern, she also demonstrates LLD in which contributes to her abnormal gait pattern. She tolerated her treatment well.  She did not have any increased pain in her legs or back after her treatment but her pain was about the same overall.     Sharri is progressing well towards her goals.   Pt prognosis is Good.     Pt will continue to benefit from skilled outpatient physical therapy to address the deficits listed in the problem list box on initial evaluation, provide pt/family education and to maximize pt's level of independence in the home and community environment.     Pt's spiritual, cultural and educational needs considered and pt agreeable to plan of care and goals.     Anticipated barriers to physical therapy: none    Goals: Goals:  Short Term Goals: 3 weeks   1. Pt will be 50% independent with HEP. Progressing, not met  2. Pt will be able to walk 2 blocks without difficulty. Progressing, not met  3. Pt will be able to lift 5 lbs without difficulty. Progressing, not met     Long Term Goals: 6 weeks   1. Pt will be 100%  independent with HEP. Progressing, not met  2. Pt will be able to walk 1/2 mile without difficulty. Progressing, not met  3. Pt will be able to increase the strength in her back and core to at least 4-/5. Progressing, not met  4. Pt will be able to walk around [employer] without difficulty. Progressing, not met  5. Pt will be able to drive for 1/2 hour without any increased pain or symptoms. Progressing, not met    Plan       Monitor response to tx and progress with PT POC as indicated.      Courtney Aiken, PT

## 2020-06-16 ENCOUNTER — TELEPHONE (OUTPATIENT)
Dept: DERMATOLOGY | Facility: CLINIC | Age: 60
End: 2020-06-16

## 2020-06-17 ENCOUNTER — CLINICAL SUPPORT (OUTPATIENT)
Dept: REHABILITATION | Facility: HOSPITAL | Age: 60
End: 2020-06-17
Attending: PAIN MEDICINE
Payer: COMMERCIAL

## 2020-06-17 DIAGNOSIS — R29.898 WEAKNESS OF BOTH LOWER EXTREMITIES: ICD-10-CM

## 2020-06-17 DIAGNOSIS — M53.86 DECREASED ROM OF LUMBAR SPINE: ICD-10-CM

## 2020-06-17 PROCEDURE — 97110 THERAPEUTIC EXERCISES: CPT

## 2020-06-17 PROCEDURE — 97014 ELECTRIC STIMULATION THERAPY: CPT

## 2020-06-17 NOTE — PROGRESS NOTES
Physical Therapy Treatment Note     Name: Sharri Condon [facility] Number: [id number]    Therapy Diagnosis:   Encounter Diagnoses   Name Primary?    Decreased ROM of lumbar spine     Weakness of both lower extremities      Physician: Adarsh Barreto MD    Visit Date: 6/17/2020    Physician Orders: PT Eval and Treat   Medical Diagnosis from Referral: M47.816 (ICD-10-CM) - Lumbar spondylosis    Evaluation Date: 6/8/2020  Authorization Period Expiration: 12/31/2020  Plan of Care Expiration: 07/20/2020  Visit # / Visits authorized: 4/30    Time In: 12:45  Time Out: 13:48  Total Billable Time: 43 minutes    Precautions: Standard    Subjective     Pt reports: her resting pain has improved  She was compliant with home exercise program.  Response to previous treatment: minimal soreness  Functional change: no significant change since initial evaluation    Pain: 5/10  Location: bilateral back left > right      Objective     Sharri received therapeutic exercises to develop strength, endurance, ROM, flexibility, posture and core stabilization for 28 minutes including:  Nustep 6 min  LTR 2 min    Hip adduction iso with ball 5 sec hold, 2 min  Hip abduction iso with strap 5 sec hold, 2 min  Modified piriformis stretch 30 sec hold 3x  Standing hip flexion, abduction and extension 3 x 10 BLE  Knee squeeze 20x  TA anton 20x  Glut sets 20x      Palpation Assessment to determine the necessity for Functional Dry Needling to lumbar paraspinals on either side of the lumbar spine   Patient provided written and verbal consent to Functional Dry Needling in the chart.    Patient demonstrated appropriate response to Functional Dry Needling x 5 min Fair  rhythmical contractions observed with estim to treated muscle groups    Sharri received the following manual therapy techniques: Joint mobilizations, Manual traction and Soft tissue Mobilization were applied to the: low back for 0 minutes, including:    Sharri received the following supervised  modalities after being cleared for contradictions: IFC Electrical Stimulation:  Sharri received IFC Electrical Stimulation for pain control applied to the low back. Pt received stimulation for 10 minutes. Sharri tolderated treatment well without any adverse effects.      Sharri received hot pack for 10 minutes to low back.    Home Exercises Provided and Patient Education Provided     Education provided:   - postural education and breathing techniques    Written Home Exercises Provided: Patient instructed to cont prior HEP.  Exercises were reviewed and Sharri was able to demonstrate them prior to the end of the session.  Sharri demonstrated good  understanding of the education provided.     See EMR under Patient Instructions for exercises provided prior visit.    Assessment   Patient has continued pain in the BLEs and low back but is noticing some improvement in the low back pain.  She responded well to the FDN.  Her back felt loose after her treatment.  She will benefit from core and back strengthening with the matrix machine at her next visit.    Sharri is progressing well towards her goals.   Pt prognosis is Good.     Pt will continue to benefit from skilled outpatient physical therapy to address the deficits listed in the problem list box on initial evaluation, provide pt/family education and to maximize pt's level of independence in the home and community environment.     Pt's spiritual, cultural and educational needs considered and pt agreeable to plan of care and goals.     Anticipated barriers to physical therapy: none    Goals: Goals:  Short Term Goals: 3 weeks   1. Pt will be 50% independent with HEP. Progressing, not met  2. Pt will be able to walk 2 blocks without difficulty. Progressing, not met  3. Pt will be able to lift 5 lbs without difficulty. Progressing, not met     Long Term Goals: 6 weeks   1. Pt will be 100% independent with HEP. Progressing, not met  2. Pt will be able to walk 1/2 mile without  difficulty. Progressing, not met  3. Pt will be able to increase the strength in her back and core to at least 4-/5. Progressing, not met  4. Pt will be able to walk around [employer] without difficulty. Progressing, not met  5. Pt will be able to drive for 1/2 hour without any increased pain or symptoms. Progressing, not met    Plan       Monitor response to tx and progress with PT POC as indicated.      Courtney Aiken, PT

## 2020-06-18 ENCOUNTER — HOSPITAL ENCOUNTER (OUTPATIENT)
Dept: RADIOLOGY | Facility: HOSPITAL | Age: 60
Discharge: HOME | End: 2020-06-18
Attending: PAIN MEDICINE
Payer: COMMERCIAL

## 2020-06-18 DIAGNOSIS — M47.816 LUMBAR SPONDYLOSIS: ICD-10-CM

## 2020-06-18 PROCEDURE — 72148 MRI LUMBAR SPINE W/O DYE: CPT | Mod: TC

## 2020-06-25 ENCOUNTER — CLINICAL SUPPORT (OUTPATIENT)
Dept: REHABILITATION | Facility: HOSPITAL | Age: 60
End: 2020-06-25
Attending: PAIN MEDICINE
Payer: COMMERCIAL

## 2020-06-25 DIAGNOSIS — R29.898 WEAKNESS OF BOTH LOWER EXTREMITIES: ICD-10-CM

## 2020-06-25 DIAGNOSIS — M53.86 DECREASED ROM OF LUMBAR SPINE: ICD-10-CM

## 2020-06-25 PROCEDURE — 97110 THERAPEUTIC EXERCISES: CPT

## 2020-06-25 NOTE — PROGRESS NOTES
Physical Therapy Treatment Note     Name: Sharri Condon [facility] Number: [id number]    Therapy Diagnosis:   Encounter Diagnoses   Name Primary?    Decreased ROM of lumbar spine     Weakness of both lower extremities      Physician: Adarsh Barreto MD    Visit Date: 6/25/2020    Physician Orders: PT Eval and Treat   Medical Diagnosis from Referral: M47.816 (ICD-10-CM) - Lumbar spondylosis    Evaluation Date: 6/8/2020  Authorization Period Expiration: 12/31/2020  Plan of Care Expiration: 07/20/2020  Visit # / Visits authorized: 5/30    Time In: 12:45  Time Out: 13:40  Total Billable Time: 45 minutes    Precautions: Standard    Subjective     Pt reports: that she fell last week after her last appointment.  She was in her raised garden and she missed a step and fell.  She hit her R knee on the concrete and she tried to catch herself with her upper body too.   She was compliant with home exercise program.  Response to previous treatment: minimal soreness  Functional change: no significant change since initial evaluation    Pain: 5/10  Location: bilateral back left > right      Objective     Sharri received therapeutic exercises to develop strength, endurance, ROM, flexibility, posture and core stabilization for 45 minutes including:  Nustep 6 min  LTR 2 min  DKTC with ball 2 min  Seated hamstring stretch 30 sec 3x BLE  Hip adduction iso with ball 5 sec hold, 2 min  Hip abduction iso with strap 5 sec hold, 2 min  Modified piriformis stretch 30 sec hold 3x  Standing hip flexion, abduction and extension 3 x 10 BLE  Knee squeeze 20x  TA anton 20x  Glut sets 20x  TA anton w/alt marching 10x      Palpation Assessment to determine the necessity for Functional Dry Needling to lumbar paraspinals on either side of the lumbar spine   Patient provided written and verbal consent to Functional Dry Needling in the chart.    Patient demonstrated appropriate response to Functional Dry Needling x 0 min Fair  rhythmical contractions  observed with estim to treated muscle groups    Sharri received the following manual therapy techniques: Joint mobilizations, Manual traction and Soft tissue Mobilization were applied to the: low back for 0 minutes, including:    Sharri received the following supervised modalities after being cleared for contradictions: IFC Electrical Stimulation:  Sharri received IFC Electrical Stimulation for pain control applied to the low back. Pt received stimulation for 0 minutes. Sharri tolderated treatment well without any adverse effects.      Sharri received hot pack for 10 minutes to low back.    Home Exercises Provided and Patient Education Provided     Education provided:   - postural education and breathing techniques    Written Home Exercises Provided: Patient instructed to cont prior HEP.  Exercises were reviewed and Sharri was able to demonstrate them prior to the end of the session.  Sharri demonstrated good  understanding of the education provided.     See EMR under Patient Instructions for exercises provided prior visit.    Assessment   Patient had increased pain in her lower extremities today due to her recent fall.  Her R knee was the most affected.  Her low back has still been feeling better overall. She fatigues easily with core strengthening exercises and needs continued work on strengthening in this area to improve her lumbar stability and reduce her overall pain and radicular symptoms.     Sharri is progressing well towards her goals.   Pt prognosis is Good.     Pt will continue to benefit from skilled outpatient physical therapy to address the deficits listed in the problem list box on initial evaluation, provide pt/family education and to maximize pt's level of independence in the home and community environment.     Pt's spiritual, cultural and educational needs considered and pt agreeable to plan of care and goals.     Anticipated barriers to physical therapy: none    Goals: Goals:  Short Term Goals: 3 weeks    1. Pt will be 50% independent with HEP. Progressing, not met  2. Pt will be able to walk 2 blocks without difficulty. Progressing, not met  3. Pt will be able to lift 5 lbs without difficulty. Progressing, not met     Long Term Goals: 6 weeks   1. Pt will be 100% independent with HEP. Progressing, not met  2. Pt will be able to walk 1/2 mile without difficulty. Progressing, not met  3. Pt will be able to increase the strength in her back and core to at least 4-/5. Progressing, not met  4. Pt will be able to walk around [employer] without difficulty. Progressing, not met  5. Pt will be able to drive for 1/2 hour without any increased pain or symptoms. Progressing, not met    Plan       Monitor response to tx and progress with PT POC as indicated.      Courtney Aiken, PT

## 2020-06-26 ENCOUNTER — PATIENT OUTREACH (OUTPATIENT)
Dept: ADMINISTRATIVE | Facility: OTHER | Age: 60
End: 2020-06-26

## 2020-06-26 NOTE — PROGRESS NOTES
Requested updates within Care Everywhere.  Patient's chart was reviewed for overdue LIDIA topics.  Immunizations reconciled.    Orders placed:  Tasked appts:  Labs Linked:

## 2020-06-30 ENCOUNTER — TELEPHONE (OUTPATIENT)
Dept: PAIN MEDICINE | Facility: CLINIC | Age: 60
End: 2020-06-30

## 2020-07-01 ENCOUNTER — TELEPHONE (OUTPATIENT)
Dept: PAIN MEDICINE | Facility: CLINIC | Age: 60
End: 2020-07-01

## 2020-07-01 ENCOUNTER — OFFICE VISIT (OUTPATIENT)
Dept: PAIN MEDICINE | Facility: CLINIC | Age: 60
End: 2020-07-01
Payer: COMMERCIAL

## 2020-07-01 VITALS
WEIGHT: 226 LBS | HEIGHT: 65 IN | SYSTOLIC BLOOD PRESSURE: 127 MMHG | DIASTOLIC BLOOD PRESSURE: 80 MMHG | BODY MASS INDEX: 37.65 KG/M2 | HEART RATE: 75 BPM

## 2020-07-01 DIAGNOSIS — Z03.818 ENCOUNTER FOR OBSERVATION FOR SUSPECTED EXPOSURE TO OTHER BIOLOGICAL AGENTS RULED OUT: Primary | ICD-10-CM

## 2020-07-01 DIAGNOSIS — M54.16 LUMBAR RADICULOPATHY: ICD-10-CM

## 2020-07-01 DIAGNOSIS — M47.816 LUMBAR SPONDYLOSIS: Primary | ICD-10-CM

## 2020-07-01 DIAGNOSIS — M48.061 SPINAL STENOSIS OF LUMBAR REGION, UNSPECIFIED WHETHER NEUROGENIC CLAUDICATION PRESENT: ICD-10-CM

## 2020-07-01 PROCEDURE — 99214 PR OFFICE/OUTPT VISIT, EST, LEVL IV, 30-39 MIN: ICD-10-PCS | Mod: S$GLB,,, | Performed by: PAIN MEDICINE

## 2020-07-01 PROCEDURE — 99214 OFFICE O/P EST MOD 30 MIN: CPT | Mod: S$GLB,,, | Performed by: PAIN MEDICINE

## 2020-07-01 PROCEDURE — 99999 PR PBB SHADOW E&M-EST. PATIENT-LVL IV: ICD-10-PCS | Mod: PBBFAC,,, | Performed by: PAIN MEDICINE

## 2020-07-01 PROCEDURE — 99999 PR PBB SHADOW E&M-EST. PATIENT-LVL IV: CPT | Mod: PBBFAC,,, | Performed by: PAIN MEDICINE
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
## 2020-07-01 NOTE — TELEPHONE ENCOUNTER
Bilateral Lumbar MBB Injection scheduled in clinic. Pre injection instructions taught. Covid Orders entered.   No medication to stop at this time      Pt was able to verbalize all understanding. All questions answered.//dp

## 2020-07-01 NOTE — PATIENT INSTRUCTIONS
-will proceed with bilateral lumbar medial branch blocks targeting the L3-5 lumbar medial branches  -continue Cymbalta 30 mg tablets as prescribed  -follow up in clinic after the procedure

## 2020-07-08 NOTE — PRE-PROCEDURE INSTRUCTIONS
Spoke with patient  regarding procedure scheduled on 7.15.2020  Arrival time  0600  Has patient been sick with fever or on antibiotics within the last 7 days? no  Has patient received a vaccination within the last 7 days? no  Has the patient stopped all medications as directed? na  Does patient have a pacemaker and or defibrillator? no  Does the patient have a ride to and from procedure and someone reliable to remain with patient? Yes  Bruno [phone]  Is the patient diabetic? no  Does the patient have sleep apnea? Or use O2 at home? No no  Is the patient receiving sedation? yes  Is the patient instructed to remain NPO beginning at midnight the night before their procedure? yes  Procedure location confirmed with patient? yes  Covid testin2020 1120 at [place]

## 2020-07-12 ENCOUNTER — OFFICE VISIT (OUTPATIENT)
Dept: URGENT CARE | Facility: CLINIC | Age: 60
End: 2020-07-12
Payer: COMMERCIAL

## 2020-07-12 VITALS
TEMPERATURE: 98 F | WEIGHT: 222.31 LBS | BODY MASS INDEX: 37 KG/M2 | OXYGEN SATURATION: 97 % | SYSTOLIC BLOOD PRESSURE: 146 MMHG | DIASTOLIC BLOOD PRESSURE: 69 MMHG | HEART RATE: 85 BPM

## 2020-07-12 DIAGNOSIS — H65.03 BILATERAL ACUTE SEROUS OTITIS MEDIA, RECURRENCE NOT SPECIFIED: Primary | ICD-10-CM

## 2020-07-12 PROCEDURE — 99213 OFFICE O/P EST LOW 20 MIN: CPT | Mod: S$GLB,,, | Performed by: FAMILY MEDICINE

## 2020-07-12 PROCEDURE — 99213 PR OFFICE/OUTPT VISIT, EST, LEVL III, 20-29 MIN: ICD-10-PCS | Mod: S$GLB,,, | Performed by: FAMILY MEDICINE

## 2020-07-12 PROCEDURE — 99999 PR PBB SHADOW E&M-EST. PATIENT-LVL IV: ICD-10-PCS | Mod: PBBFAC,,, | Performed by: FAMILY MEDICINE

## 2020-07-12 PROCEDURE — 99999 PR PBB SHADOW E&M-EST. PATIENT-LVL IV: CPT | Mod: PBBFAC,,, | Performed by: FAMILY MEDICINE

## 2020-07-12 RX ORDER — AZITHROMYCIN 250 MG/1
TABLET, FILM COATED ORAL
Qty: 6 TABLET | Refills: 0 | Status: SHIPPED | OUTPATIENT
Start: 2020-07-12 | End: 2020-07-17

## 2020-07-12 NOTE — PROGRESS NOTES
Subjective:       Patient ID: Sharri House is a 59 y.o. female.    Chief Complaint: Otalgia    BP (!) 146/69   Pulse 85   Temp 98.2 °F (36.8 °C) (Oral)   Wt 100.8 kg (222 lb 5.3 oz)   SpO2 97%   BMI 37.00 kg/m²     HPI  Bilateral earache 4 days. No other associated symptoms. Thought maybe swimmer's ear, had before with swimming.    Review of Systems   Constitutional: Negative for fever.   HENT: Positive for ear discharge (clear) and ear pain. Negative for congestion, facial swelling, hearing loss, sinus pressure, sinus pain and sore throat.    Respiratory: Negative for cough and shortness of breath.        Objective:      Physical Exam  Vitals signs and nursing note reviewed.   Constitutional:       General: She is not in acute distress.     Appearance: She is well-developed. She is not diaphoretic.   HENT:      Head: Normocephalic and atraumatic.      Ears:      Comments: TM -marked  Erythema with effusion bilaterally,   Ear canal - mildly erythematous bilaterally     Mouth/Throat:      Pharynx: No oropharyngeal exudate.   Eyes:      General:         Right eye: No discharge.         Left eye: No discharge.      Pupils: Pupils are equal, round, and reactive to light.   Neck:      Musculoskeletal: Normal range of motion and neck supple.   Cardiovascular:      Rate and Rhythm: Normal rate.   Pulmonary:      Effort: Pulmonary effort is normal.   Lymphadenopathy:      Cervical: No cervical adenopathy.   Neurological:      Mental Status: She is alert and oriented to person, place, and time.         Assessment:       1. Bilateral acute serous otitis media, recurrence not specified        Plan:     Sharri was seen today for otalgia.    Diagnoses and all orders for this visit:    Bilateral acute serous otitis media, recurrence not specified  -     azithromycin (Z-JASMINA) 250 MG tablet; Take 2 tablets by mouth on day 1; Take 1 tablet by mouth on days 2-5    Rx antibiotics for mid ear infection  Self care information  provided  Follow up as needed

## 2020-07-12 NOTE — PATIENT INSTRUCTIONS
Rx antibiotics for mid ear infection  Self care information provided  Follow up as needed          Middle Ear Infection (Adult)  You have an infection of the middle ear, the space behind the eardrum. This is also called acute otitis media (AOM). Sometimes it is caused by the common cold. This is because congestion can block the internal passage (eustachian tube) that drains fluid from the middle ear. When the middle ear fills with fluid, bacteria can grow there and cause an infection. Oral antibiotics are used to treat this illness, not ear drops. Symptoms usually start to improve within 1 to 2 days of treatment.    Home care  The following are general care guidelines:  · Finish all of the antibiotic medicine given, even though you may feel better after the first few days.  · You may use over-the-counter medicine, such as acetaminophen or ibuprofen, to control pain and fever, unless something else was prescribed. If you have chronic liver or kidney disease or have ever had a stomach ulcer or gastrointestinal bleeding, talk with your healthcare provider before using these medicines. Do not give aspirin to anyone under 18 years of age who has a fever. It may cause severe illness or death.  Follow-up care  Follow up with your healthcare provider, or as advised, in 2 weeks if all symptoms have not gotten better, or if hearing doesn't go back to normal within 1 month.  When to seek medical advice  Call your healthcare provider right away if any of these occur:  · Ear pain gets worse or does not improve after 3 days of treatment  · Unusual drowsiness or confusion  · Neck pain, stiff neck, or headache  · Fluid or blood draining from the ear canal  · Fever of 100.4°F (38°C) or as advised   · Seizure  Date Last Reviewed: 6/1/2016 © 2000-2017 [employer]. [address] [place] [address], [place] [address]. All rights reserved. This information is not intended as a substitute for professional medical care. Always follow  your healthcare professional's instructions.

## 2020-07-13 ENCOUNTER — TELEPHONE (OUTPATIENT)
Dept: PAIN MEDICINE | Facility: CLINIC | Age: 60
End: 2020-07-13

## 2020-07-13 NOTE — TELEPHONE ENCOUNTER
----- Message from Francisca Thompson sent at 7/13/2020  9:56 AM CDT -----  Contact: PATIENT  Type:  Patient Returning Call    Who Called:Sharri House  Who Left Message for Patient: IVANA  Does the patient know what this is regarding?:RESCHEDULING AN APPOINTMENT  Would the patient rather a call back or a response via [facility]? CALL BACK   Best Call Back Number:[phone] (CELL)   Additional Information: PATIENT CALLED BACK IN REGARDS TO A MISSED CALLED FROM DR. STORY NURSE IVANA.

## 2020-07-17 ENCOUNTER — DOCUMENTATION ONLY (OUTPATIENT)
Dept: REHABILITATION | Facility: HOSPITAL | Age: 60
End: 2020-07-17

## 2020-07-17 ENCOUNTER — APPOINTMENT (OUTPATIENT)
Dept: URGENT CARE | Facility: CLINIC | Age: 60
End: 2020-07-17
Payer: COMMERCIAL

## 2020-07-17 ENCOUNTER — OFFICE VISIT (OUTPATIENT)
Dept: FAMILY MEDICINE | Facility: CLINIC | Age: 60
End: 2020-07-17
Payer: COMMERCIAL

## 2020-07-17 VITALS
OXYGEN SATURATION: 96 % | WEIGHT: 221.88 LBS | SYSTOLIC BLOOD PRESSURE: 136 MMHG | DIASTOLIC BLOOD PRESSURE: 70 MMHG | HEART RATE: 93 BPM | TEMPERATURE: 98 F | BODY MASS INDEX: 36.92 KG/M2

## 2020-07-17 DIAGNOSIS — Z01.812 PRE-PROCEDURE LAB EXAM: Primary | ICD-10-CM

## 2020-07-17 DIAGNOSIS — L02.419 AXILLARY ABSCESS: ICD-10-CM

## 2020-07-17 DIAGNOSIS — H65.03 NON-RECURRENT ACUTE SEROUS OTITIS MEDIA OF BOTH EARS: Primary | ICD-10-CM

## 2020-07-17 DIAGNOSIS — M54.16 LUMBAR RADICULOPATHY: ICD-10-CM

## 2020-07-17 PROCEDURE — 99214 OFFICE O/P EST MOD 30 MIN: CPT | Mod: S$GLB,,, | Performed by: FAMILY MEDICINE

## 2020-07-17 PROCEDURE — U0003 INFECTIOUS AGENT DETECTION BY NUCLEIC ACID (DNA OR RNA); SEVERE ACUTE RESPIRATORY SYNDROME CORONAVIRUS 2 (SARS-COV-2) (CORONAVIRUS DISEASE [COVID-19]), AMPLIFIED PROBE TECHNIQUE, MAKING USE OF HIGH THROUGHPUT TECHNOLOGIES AS DESCRIBED BY CMS-2020-01-R: HCPCS

## 2020-07-17 PROCEDURE — 99999 PR PBB SHADOW E&M-EST. PATIENT-LVL III: ICD-10-PCS | Mod: PBBFAC,,, | Performed by: FAMILY MEDICINE

## 2020-07-17 PROCEDURE — 99214 PR OFFICE/OUTPT VISIT, EST, LEVL IV, 30-39 MIN: ICD-10-PCS | Mod: S$GLB,,, | Performed by: FAMILY MEDICINE

## 2020-07-17 PROCEDURE — 99999 PR PBB SHADOW E&M-EST. PATIENT-LVL III: CPT | Mod: PBBFAC,,, | Performed by: FAMILY MEDICINE

## 2020-07-17 RX ORDER — SULFAMETHOXAZOLE AND TRIMETHOPRIM 800; 160 MG/1; MG/1
1 TABLET ORAL 2 TIMES DAILY
Qty: 10 TABLET | Refills: 0 | Status: SHIPPED | OUTPATIENT
Start: 2020-07-17 | End: 2020-09-08

## 2020-07-17 NOTE — PROGRESS NOTES
Subjective:       Patient ID: Sharri House is a 59 y.o. female.    Chief Complaint: Follow-up      HPI Comments:       Current Outpatient Medications:     DULoxetine (CYMBALTA) 30 MG capsule, Take 1 capsule (30 mg total) by mouth once daily., Disp: 30 capsule, Rfl: 11    furosemide (LASIX) 20 MG tablet, Take 1 tablet (20 mg total) by mouth once daily., Disp: 30 tablet, Rfl: 2    hydroxyzine HCL (ATARAX) 25 MG tablet, Take 1 tablet (25 mg total) by mouth nightly as needed for Itching., Disp: 30 tablet, Rfl: 1    azithromycin (Z-JASMINA) 250 MG tablet, Take 2 tablets by mouth on day 1; Take 1 tablet by mouth on days 2-5, Disp: 6 tablet, Rfl: 0    ibuprofen (ADVIL,MOTRIN) 200 MG tablet, Take 200 mg by mouth every 6 (six) hours as needed for Pain., Disp: , Rfl:     potassium 99 mg Tab, Take 1 tablet by mouth once daily., Disp: , Rfl:     sulfamethoxazole-trimethoprim 800-160mg (BACTRIM DS) 800-160 mg Tab, Take 1 tablet by mouth 2 (two) times daily., Disp: 10 tablet, Rfl: 0    triamcinolone acetonide 0.1% (KENALOG) 0.1 % cream, Apply topically 2 (two) times daily as needed. For itch. (Patient not taking: Reported on 7/17/2020), Disp: 454 g, Rfl: 1      Treated last week for bilateral serous otitis media.  Zithromax.  Ears less painful but still have a little bit of pressure in them.  Also has a new bump on her left axilla.  Last time I saw her a treated her with sulfa for a left axillary abscess which resolved.    Planning to get a injection in her back soon but had to postpone it because of the antibiotics.  Also has a COVID test pending.  PT has stopped for the time being    Review of Systems   Constitutional: Negative for activity change, appetite change and fever.   HENT: Positive for congestion. Negative for sore throat.    Respiratory: Negative for cough and shortness of breath.    Cardiovascular: Negative for chest pain.   Gastrointestinal: Negative for abdominal pain, diarrhea and nausea.    Genitourinary: Negative for difficulty urinating.   Musculoskeletal: Negative for arthralgias and myalgias.   Skin: Positive for color change.   Neurological: Negative for dizziness and headaches.       Objective:      Vitals:    07/17/20 1019   BP: 136/70   Pulse: 93   Temp: 97.9 °F (36.6 °C)   TempSrc: Tympanic   SpO2: 96%   Weight: 100.7 kg (221 lb 14.3 oz)   PainSc: 0-No pain     Physical Exam  Vitals signs and nursing note reviewed.   Constitutional:       General: She is not in acute distress.     Appearance: She is well-developed. She is not ill-appearing or diaphoretic.   HENT:      Head: Normocephalic.      Right Ear: Tympanic membrane, ear canal and external ear normal.      Left Ear: Tympanic membrane, ear canal and external ear normal.      Nose: No mucosal edema or congestion.      Mouth/Throat:      Pharynx: No pharyngeal swelling, oropharyngeal exudate or posterior oropharyngeal erythema.   Neck:      Musculoskeletal: Neck supple.      Thyroid: No thyromegaly.   Cardiovascular:      Rate and Rhythm: Normal rate and regular rhythm.      Heart sounds: Normal heart sounds. No murmur.   Pulmonary:      Effort: Pulmonary effort is normal.      Breath sounds: Normal breath sounds. No wheezing or rales.   Chest:       Abdominal:      General: There is no distension.      Palpations: Abdomen is soft.   Lymphadenopathy:      Cervical: No cervical adenopathy.   Skin:     General: Skin is warm and dry.   Neurological:      Mental Status: She is alert and oriented to person, place, and time.   Psychiatric:         Behavior: Behavior normal.         Thought Content: Thought content normal.         Judgment: Judgment normal.         Assessment:       1. Non-recurrent acute serous otitis media of both ears    2. Axillary abscess    3. Lumbar radiculopathy        Plan:   Non-recurrent acute serous otitis media of both ears  Comments:  Resolved    Axillary abscess  Comments:  5 days of Bactrim.  Continue anti  staphylococcal measures at home    Lumbar radiculopathy  Comments:  Has been starting physical therapy.  Plans to get injections soon    Other orders  -     sulfamethoxazole-trimethoprim 800-160mg (BACTRIM DS) 800-160 mg Tab; Take 1 tablet by mouth 2 (two) times daily.  Dispense: 10 tablet; Refill: 0

## 2020-07-17 NOTE — PROGRESS NOTES
Outpatient Therapy Discharge Summary     Name: Sharri Condon [facility] Number: [id number]    Therapy Diagnosis: No diagnosis found.  Physician: No ref. provider found    Physician Orders: PT Eval and Treat   Medical Diagnosis from Referral: M47.816 (ICD-10-CM) - Lumbar spondylosis    Evaluation Date: 6/8/2020      Date of Last visit: 06/25/2020  Total Visits Received: 5  Cancelled Visits: 3  No Show Visits: 0    Assessment    Goals:   Short Term Goals: 3 weeks   1. Pt will be 50% independent with HEP. not met  2. Pt will be able to walk 2 blocks without difficulty.  not met  3. Pt will be able to lift 5 lbs without difficulty.  not met     Long Term Goals: 6 weeks   1. Pt will be 100% independent with HEP.  not met  2. Pt will be able to walk 1/2 mile without difficulty.  not met  3. Pt will be able to increase the strength in her back and core to at least 4-/5. not met  4. Pt will be able to walk around [employer] without difficulty.  not met  5. Pt will be able to drive for 1/2 hour without any increased pain or symptoms.  not met      Discharge reason: Patient requested discharge    Plan   This patient is discharged from Physical Therapy

## 2020-07-19 LAB — SARS-COV-2 RNA RESP QL NAA+PROBE: NOT DETECTED

## 2020-07-20 ENCOUNTER — TELEPHONE (OUTPATIENT)
Dept: PAIN MEDICINE | Facility: CLINIC | Age: 60
End: 2020-07-20

## 2020-07-20 NOTE — TELEPHONE ENCOUNTER
Spoke with vesta, she informed me pt covid kadie was not labeled, informed her pt got test on 07/17. . All questions answered.   [facility] Interventional pain medicine

## 2020-07-20 NOTE — TELEPHONE ENCOUNTER
----- Message from Nicole Jose sent at 7/19/2020 11:16 PM CDT -----  Regarding: Lab Client Services  Contact: [phone]  Good Morning,    My name is Nicole Jose I work in the Lab Client Services. We had a problem with some lab work on this patient. If someone from your office could call us at [phone] or ext. [phone] that would be great. Anyone in my department can help.     Thank you

## 2020-07-21 ENCOUNTER — TELEPHONE (OUTPATIENT)
Dept: PAIN MEDICINE | Facility: CLINIC | Age: 60
End: 2020-07-21

## 2020-07-21 NOTE — TELEPHONE ENCOUNTER
----- Message from Adarsh Barreto MD sent at 7/21/2020 10:26 AM CDT -----  Regarding: FW: Lab Client Services  Contact: [phone]  Will you give them a buzz?  ----- Message -----  From: Nicole Jose  Sent: 7/19/2020  11:16 PM CDT  To: Adarsh Barreto MD, [facility] Staff  Subject: Lab Client Services                              Good Morning,    My name is Nicole Jose I work in the Lab Client Services. We had a problem with some lab work on this patient. If someone from your office could call us at [phone] or ext. [phone] that would be great. Anyone in my department can help.     Thank you

## 2020-07-21 NOTE — TELEPHONE ENCOUNTER
This issue was resolved  They received a test with no label this test has been re done and submitted//dp

## 2020-07-27 ENCOUNTER — TELEPHONE (OUTPATIENT)
Dept: RHEUMATOLOGY | Facility: CLINIC | Age: 60
End: 2020-07-27

## 2020-07-27 NOTE — TELEPHONE ENCOUNTER
Called to confirm 7.27.20 appt and pt rescheduled to 7.30.20 at 3.15 pm. Pt will look into downloading [employer] to do virtual visit as well.

## 2020-07-29 ENCOUNTER — PATIENT OUTREACH (OUTPATIENT)
Dept: ADMINISTRATIVE | Facility: OTHER | Age: 60
End: 2020-07-29

## 2020-08-05 ENCOUNTER — TELEPHONE (OUTPATIENT)
Dept: PULMONOLOGY | Facility: CLINIC | Age: 60
End: 2020-08-05

## 2020-08-11 ENCOUNTER — PATIENT OUTREACH (OUTPATIENT)
Dept: ADMINISTRATIVE | Facility: HOSPITAL | Age: 60
End: 2020-08-11

## 2020-08-11 DIAGNOSIS — Z12.11 ENCOUNTER FOR SCREENING FOR MALIGNANT NEOPLASM OF COLON: Primary | ICD-10-CM

## 2020-08-11 NOTE — PROGRESS NOTES
Contacted patient to follow up on overdue Colonoscopy. Patient agreed to be scheduled 09/11/2020    Covid Testing Scheduled    Message sent to lpn to enter order

## 2020-08-12 ENCOUNTER — PATIENT OUTREACH (OUTPATIENT)
Dept: ADMINISTRATIVE | Facility: OTHER | Age: 60
End: 2020-08-12

## 2020-08-13 NOTE — PROGRESS NOTES
Chart reviewed.   Immunizations: Triggered Imm Registry     Orders placed: n/a  Upcoming appts to satisfy LIDIA topics: n/a

## 2020-08-14 ENCOUNTER — OFFICE VISIT (OUTPATIENT)
Dept: FAMILY MEDICINE | Facility: CLINIC | Age: 60
End: 2020-08-14
Payer: COMMERCIAL

## 2020-08-14 VITALS
BODY MASS INDEX: 37.34 KG/M2 | OXYGEN SATURATION: 95 % | TEMPERATURE: 98 F | HEIGHT: 65 IN | HEART RATE: 92 BPM | SYSTOLIC BLOOD PRESSURE: 132 MMHG | RESPIRATION RATE: 16 BRPM | WEIGHT: 224.13 LBS | DIASTOLIC BLOOD PRESSURE: 82 MMHG

## 2020-08-14 DIAGNOSIS — R03.0 BLOOD PRESSURE ELEVATED WITHOUT HISTORY OF HTN: ICD-10-CM

## 2020-08-14 DIAGNOSIS — L08.9 RECURRENT INFECTION OF SKIN: Primary | ICD-10-CM

## 2020-08-14 DIAGNOSIS — Z12.11 SCREENING FOR COLON CANCER: ICD-10-CM

## 2020-08-14 DIAGNOSIS — M54.16 LUMBAR RADICULOPATHY: ICD-10-CM

## 2020-08-14 PROCEDURE — 99214 OFFICE O/P EST MOD 30 MIN: CPT | Mod: S$GLB,,, | Performed by: FAMILY MEDICINE

## 2020-08-14 PROCEDURE — 99999 PR PBB SHADOW E&M-EST. PATIENT-LVL III: ICD-10-PCS | Mod: PBBFAC,,, | Performed by: FAMILY MEDICINE

## 2020-08-14 PROCEDURE — 99999 PR PBB SHADOW E&M-EST. PATIENT-LVL III: CPT | Mod: PBBFAC,,, | Performed by: FAMILY MEDICINE

## 2020-08-14 PROCEDURE — 99214 PR OFFICE/OUTPT VISIT, EST, LEVL IV, 30-39 MIN: ICD-10-PCS | Mod: S$GLB,,, | Performed by: FAMILY MEDICINE

## 2020-08-14 RX ORDER — MUPIROCIN 20 MG/G
OINTMENT TOPICAL 3 TIMES DAILY
Qty: 30 G | Refills: 2 | Status: SHIPPED | OUTPATIENT
Start: 2020-08-14 | End: 2024-01-29

## 2020-08-14 RX ORDER — SULFAMETHOXAZOLE AND TRIMETHOPRIM 800; 160 MG/1; MG/1
1 TABLET ORAL 2 TIMES DAILY
Qty: 14 TABLET | Refills: 0 | Status: SHIPPED | OUTPATIENT
Start: 2020-08-14 | End: 2020-09-08 | Stop reason: SDUPTHER

## 2020-08-14 NOTE — PROGRESS NOTES
Subjective:       Patient ID: Sharri House is a 59 y.o. female.    Chief Complaint: Recurrent Skin Infections      HPI Comments:       Current Outpatient Medications:     DULoxetine (CYMBALTA) 30 MG capsule, Take 1 capsule (30 mg total) by mouth once daily., Disp: 30 capsule, Rfl: 11    furosemide (LASIX) 20 MG tablet, Take 1 tablet (20 mg total) by mouth once daily., Disp: 30 tablet, Rfl: 2    hydroxyzine HCL (ATARAX) 25 MG tablet, Take 1 tablet (25 mg total) by mouth nightly as needed for Itching., Disp: 30 tablet, Rfl: 1    ibuprofen (ADVIL,MOTRIN) 200 MG tablet, Take 200 mg by mouth every 6 (six) hours as needed for Pain., Disp: , Rfl:     potassium 99 mg Tab, Take 1 tablet by mouth once daily., Disp: , Rfl:     triamcinolone acetonide 0.1% (KENALOG) 0.1 % cream, Apply topically 2 (two) times daily as needed. For itch., Disp: 454 g, Rfl: 1    mupirocin (BACTROBAN) 2 % ointment, Apply topically 3 (three) times daily., Disp: 30 g, Rfl: 2    sulfamethoxazole-trimethoprim 800-160mg (BACTRIM DS) 800-160 mg Tab, Take 1 tablet by mouth 2 (two) times daily. (Patient not taking: Reported on 8/14/2020), Disp: 10 tablet, Rfl: 0    sulfamethoxazole-trimethoprim 800-160mg (BACTRIM DS) 800-160 mg Tab, Take 1 tablet by mouth 2 (two) times daily., Disp: 14 tablet, Rfl: 0      She started getting a new crop of small infections under her right axilla over the last few days.  Nothing draining.  Nothing too painful.  Left axillary lesion resolved after 5 days antibiotics treated last month.  Using Dial soap and Hibiclens, the latter twice a day.  Also has a small lesion near her right groin that is also very small and not draining    Colonoscopy scheduled for 9/8    Review of Systems   Constitutional: Negative for activity change, appetite change and fever.   HENT: Negative for sore throat.    Respiratory: Negative for cough and shortness of breath.    Cardiovascular: Negative for chest pain.   Gastrointestinal:  "Negative for abdominal pain, diarrhea and nausea.   Genitourinary: Negative for difficulty urinating.   Musculoskeletal: Negative for arthralgias and myalgias.   Skin: Positive for rash.   Neurological: Negative for dizziness and headaches.       Objective:      Vitals:    08/14/20 1430   BP: 132/82   Pulse: 92   Resp: 16   Temp: 97.9 °F (36.6 °C)   TempSrc: Temporal   SpO2: 95%   Weight: 101.6 kg (224 lb 1.6 oz)   Height: 5' 5" (1.651 m)     Physical Exam  Vitals signs and nursing note reviewed.   Constitutional:       General: She is not in acute distress.     Appearance: She is well-developed. She is not diaphoretic.   HENT:      Head: Normocephalic.   Neck:      Musculoskeletal: Neck supple.      Thyroid: No thyromegaly.   Cardiovascular:      Rate and Rhythm: Normal rate and regular rhythm.      Heart sounds: Normal heart sounds. No murmur.   Pulmonary:      Effort: Pulmonary effort is normal.      Breath sounds: Normal breath sounds. No wheezing or rales.   Chest:       Abdominal:      General: There is no distension.      Palpations: Abdomen is soft.   Lymphadenopathy:      Cervical: No cervical adenopathy.   Skin:     General: Skin is warm and dry.   Neurological:      Mental Status: She is alert and oriented to person, place, and time.   Psychiatric:         Behavior: Behavior normal.         Thought Content: Thought content normal.         Judgment: Judgment normal.         Assessment:       1. Recurrent infection of skin    2. Lumbar radiculopathy    3. Blood pressure elevated without history of HTN    4. Screening for colon cancer        Plan:   Recurrent infection of skin  Comments:  Continue prophylactic measures.  Bactroban ointment for open lesions.  Bactrim x7 days for current lesions    Lumbar radiculopathy  Comments:  Back injections planned soon    Blood pressure elevated without history of HTN  Comments:  Have been normal 3 out of the last 4    Screening for colon cancer  Comments:  Colonoscopy " scheduled 9/8    Other orders  -     sulfamethoxazole-trimethoprim 800-160mg (BACTRIM DS) 800-160 mg Tab; Take 1 tablet by mouth 2 (two) times daily.  Dispense: 14 tablet; Refill: 0  -     mupirocin (BACTROBAN) 2 % ointment; Apply topically 3 (three) times daily.  Dispense: 30 g; Refill: 2

## 2020-09-08 ENCOUNTER — OFFICE VISIT (OUTPATIENT)
Dept: FAMILY MEDICINE | Facility: CLINIC | Age: 60
End: 2020-09-08
Payer: COMMERCIAL

## 2020-09-08 ENCOUNTER — APPOINTMENT (OUTPATIENT)
Dept: URGENT CARE | Facility: CLINIC | Age: 60
End: 2020-09-08
Payer: COMMERCIAL

## 2020-09-08 VITALS
BODY MASS INDEX: 37.95 KG/M2 | DIASTOLIC BLOOD PRESSURE: 72 MMHG | WEIGHT: 228.06 LBS | HEART RATE: 94 BPM | SYSTOLIC BLOOD PRESSURE: 124 MMHG | OXYGEN SATURATION: 95 % | TEMPERATURE: 98 F

## 2020-09-08 DIAGNOSIS — R03.0 BLOOD PRESSURE ELEVATED WITHOUT HISTORY OF HTN: ICD-10-CM

## 2020-09-08 DIAGNOSIS — J02.9 SORE THROAT: ICD-10-CM

## 2020-09-08 DIAGNOSIS — Z01.812 PRE-PROCEDURE LAB EXAM: Primary | ICD-10-CM

## 2020-09-08 DIAGNOSIS — H92.01 ACUTE OTALGIA, RIGHT: Primary | ICD-10-CM

## 2020-09-08 DIAGNOSIS — Z72.0 TOBACCO ABUSE: ICD-10-CM

## 2020-09-08 DIAGNOSIS — L08.9 RECURRENT INFECTION OF SKIN: ICD-10-CM

## 2020-09-08 LAB
CTP QC/QA: YES
S PYO RRNA THROAT QL PROBE: NEGATIVE

## 2020-09-08 PROCEDURE — 99214 OFFICE O/P EST MOD 30 MIN: CPT | Mod: 25,S$GLB,, | Performed by: FAMILY MEDICINE

## 2020-09-08 PROCEDURE — 99999 PR PBB SHADOW E&M-EST. PATIENT-LVL III: ICD-10-PCS | Mod: PBBFAC,,, | Performed by: FAMILY MEDICINE

## 2020-09-08 PROCEDURE — 99214 PR OFFICE/OUTPT VISIT, EST, LEVL IV, 30-39 MIN: ICD-10-PCS | Mod: 25,S$GLB,, | Performed by: FAMILY MEDICINE

## 2020-09-08 PROCEDURE — 87880 POCT RAPID STREP A: ICD-10-PCS | Mod: QW,S$GLB,, | Performed by: FAMILY MEDICINE

## 2020-09-08 PROCEDURE — U0003 INFECTIOUS AGENT DETECTION BY NUCLEIC ACID (DNA OR RNA); SEVERE ACUTE RESPIRATORY SYNDROME CORONAVIRUS 2 (SARS-COV-2) (CORONAVIRUS DISEASE [COVID-19]), AMPLIFIED PROBE TECHNIQUE, MAKING USE OF HIGH THROUGHPUT TECHNOLOGIES AS DESCRIBED BY CMS-2020-01-R: HCPCS

## 2020-09-08 PROCEDURE — 99999 PR PBB SHADOW E&M-EST. PATIENT-LVL III: CPT | Mod: PBBFAC,,, | Performed by: FAMILY MEDICINE

## 2020-09-08 PROCEDURE — 87880 STREP A ASSAY W/OPTIC: CPT | Mod: QW,S$GLB,, | Performed by: FAMILY MEDICINE

## 2020-09-08 RX ORDER — SULFAMETHOXAZOLE AND TRIMETHOPRIM 800; 160 MG/1; MG/1
1 TABLET ORAL 2 TIMES DAILY
Qty: 28 TABLET | Refills: 0 | Status: SHIPPED | OUTPATIENT
Start: 2020-09-08 | End: 2020-12-04 | Stop reason: SDUPTHER

## 2020-09-08 NOTE — PROGRESS NOTES
Subjective:       Patient ID: Sharri House is a 59 y.o. female.    Chief Complaint: No chief complaint on file.      HPI Comments:       Current Outpatient Medications:     DULoxetine (CYMBALTA) 30 MG capsule, Take 1 capsule (30 mg total) by mouth once daily., Disp: 30 capsule, Rfl: 11    furosemide (LASIX) 20 MG tablet, Take 1 tablet (20 mg total) by mouth once daily., Disp: 30 tablet, Rfl: 2    hydroxyzine HCL (ATARAX) 25 MG tablet, Take 1 tablet (25 mg total) by mouth nightly as needed for Itching., Disp: 30 tablet, Rfl: 1    ibuprofen (ADVIL,MOTRIN) 200 MG tablet, Take 200 mg by mouth every 6 (six) hours as needed for Pain., Disp: , Rfl:     mupirocin (BACTROBAN) 2 % ointment, Apply topically 3 (three) times daily., Disp: 30 g, Rfl: 2    potassium 99 mg Tab, Take 1 tablet by mouth once daily., Disp: , Rfl:     sulfamethoxazole-trimethoprim 800-160mg (BACTRIM DS) 800-160 mg Tab, Take 1 tablet by mouth 2 (two) times daily., Disp: 28 tablet, Rfl: 0    triamcinolone acetonide 0.1% (KENALOG) 0.1 % cream, Apply topically 2 (two) times daily as needed. For itch., Disp: 454 g, Rfl: 1      Right ear pain off and on for the last 2 days.  Sore throat.  Feels run down clammy.  No rhinorrhea or cough.  No chills.  No loss of taste or smell.  COVID test done today because of a colonoscopy scheduled for end of week.  Has not taken any medications for her symptoms so far    Also has a new staff lesion under her left axilla and in left groin.  Very small and mostly nontender    Review of Systems   Constitutional: Negative for activity change, appetite change and fever.   HENT: Positive for ear pain, sore throat and trouble swallowing. Negative for ear discharge.    Respiratory: Negative for cough and shortness of breath.    Cardiovascular: Negative for chest pain.   Gastrointestinal: Negative for abdominal pain, diarrhea and nausea.   Genitourinary: Negative for difficulty urinating.   Musculoskeletal: Negative for  arthralgias and myalgias.   Skin: Positive for rash.   Neurological: Negative for dizziness and headaches.       Objective:      Vitals:    09/08/20 0947   BP: 124/72   Pulse: 94   Temp: 97.9 °F (36.6 °C)   SpO2: 95%   Weight: 103.4 kg (228 lb 1.1 oz)   PainSc:   8   PainLoc: Ear     Physical Exam  Vitals signs and nursing note reviewed.   Constitutional:       General: She is not in acute distress.     Appearance: She is well-developed. She is ill-appearing. She is not diaphoretic.   HENT:      Head: Normocephalic.      Right Ear: Tympanic membrane, ear canal and external ear normal.      Left Ear: Ear canal and external ear normal. Tympanic membrane is injected.      Nose: No mucosal edema.      Mouth/Throat:      Pharynx: Pharyngeal swelling present. No oropharyngeal exudate or posterior oropharyngeal erythema.   Neck:      Musculoskeletal: Neck supple.      Thyroid: No thyromegaly.      Comments: Lymph node is tender  Cardiovascular:      Rate and Rhythm: Normal rate and regular rhythm.      Heart sounds: Normal heart sounds. No murmur.   Pulmonary:      Effort: Pulmonary effort is normal.      Breath sounds: Normal breath sounds. No wheezing or rales.   Abdominal:      General: There is no distension.      Palpations: Abdomen is soft.   Lymphadenopathy:      Cervical: Cervical adenopathy present.      Right cervical: Superficial cervical adenopathy present.   Skin:     General: Skin is warm and dry.   Neurological:      Mental Status: She is alert and oriented to person, place, and time.   Psychiatric:         Behavior: Behavior normal.         Thought Content: Thought content normal.         Judgment: Judgment normal.         Assessment:       1. Acute otalgia, right    2. Recurrent infection of skin    3. Blood pressure elevated without history of HTN    4. Tobacco abuse    5. Sore throat        Plan:   Acute otalgia, right  Comments:  Normal exam.  Over-the-counter analgesics as needed.   Antihistamines    Recurrent infection of skin  Comments:  Fourteen day supply of Bactrim.    Blood pressure elevated without history of HTN  Comments:  Blood pressure still normal without medication    Tobacco abuse    Sore throat  Comments:  Rapid strep negative.  Warm saltwater gargles and Tylenol.  Rest and fluids  Orders:  -     POCT Rapid Strep A    Other orders  -     sulfamethoxazole-trimethoprim 800-160mg (BACTRIM DS) 800-160 mg Tab; Take 1 tablet by mouth 2 (two) times daily.  Dispense: 28 tablet; Refill: 0

## 2020-09-09 DIAGNOSIS — Z12.11 COLON CANCER SCREENING: Primary | ICD-10-CM

## 2020-09-09 LAB — SARS-COV-2 RNA RESP QL NAA+PROBE: NOT DETECTED

## 2020-09-09 RX ORDER — SODIUM, POTASSIUM,MAG SULFATES 17.5-3.13G
1 SOLUTION, RECONSTITUTED, ORAL ORAL DAILY
Qty: 1 KIT | Refills: 0 | Status: ON HOLD | OUTPATIENT
Start: 2020-09-09 | End: 2020-09-11 | Stop reason: HOSPADM

## 2020-09-11 ENCOUNTER — HOSPITAL ENCOUNTER (OUTPATIENT)
Facility: HOSPITAL | Age: 60
Discharge: HOME | End: 2020-09-11
Attending: INTERNAL MEDICINE | Admitting: INTERNAL MEDICINE
Payer: COMMERCIAL

## 2020-09-11 ENCOUNTER — ANESTHESIA (OUTPATIENT)
Dept: ENDOSCOPY | Facility: HOSPITAL | Age: 60
End: 2020-09-11
Payer: COMMERCIAL

## 2020-09-11 ENCOUNTER — ANESTHESIA EVENT (OUTPATIENT)
Dept: ENDOSCOPY | Facility: HOSPITAL | Age: 60
End: 2020-09-11
Payer: COMMERCIAL

## 2020-09-11 VITALS
SYSTOLIC BLOOD PRESSURE: 116 MMHG | WEIGHT: 220.69 LBS | BODY MASS INDEX: 36.72 KG/M2 | DIASTOLIC BLOOD PRESSURE: 67 MMHG | HEART RATE: 61 BPM | RESPIRATION RATE: 15 BRPM | OXYGEN SATURATION: 96 % | TEMPERATURE: 97 F

## 2020-09-11 DIAGNOSIS — Z12.11 SCREENING FOR MALIGNANT NEOPLASM OF COLON: ICD-10-CM

## 2020-09-11 DIAGNOSIS — K64.2 GRADE III HEMORRHOIDS: Primary | ICD-10-CM

## 2020-09-11 PROCEDURE — 88305 TISSUE EXAM BY PATHOLOGIST: ICD-10-PCS | Mod: 26,,, | Performed by: PATHOLOGY

## 2020-09-11 PROCEDURE — 45382 COLONOSCOPY W/CONTROL BLEED: CPT | Performed by: INTERNAL MEDICINE

## 2020-09-11 PROCEDURE — 45385 COLONOSCOPY W/LESION REMOVAL: CPT | Mod: 33,,, | Performed by: INTERNAL MEDICINE

## 2020-09-11 PROCEDURE — 27200959 HC CATHETER, ERBE, ARGON PLASMA: Performed by: INTERNAL MEDICINE

## 2020-09-11 PROCEDURE — 45385 COLONOSCOPY W/LESION REMOVAL: CPT | Performed by: INTERNAL MEDICINE

## 2020-09-11 PROCEDURE — 88305 TISSUE EXAM BY PATHOLOGIST: CPT | Mod: 26,,, | Performed by: PATHOLOGY

## 2020-09-11 PROCEDURE — 63600175 PHARM REV CODE 636 W HCPCS: Performed by: NURSE ANESTHETIST, CERTIFIED REGISTERED

## 2020-09-11 PROCEDURE — 45382 COLONOSCOPY W/CONTROL BLEED: CPT | Mod: 59,,, | Performed by: INTERNAL MEDICINE

## 2020-09-11 PROCEDURE — 45385 PR COLONOSCOPY,REMV LESN,SNARE: ICD-10-PCS | Mod: 33,,, | Performed by: INTERNAL MEDICINE

## 2020-09-11 PROCEDURE — 27201089 HC SNARE, DISP (ANY): Performed by: INTERNAL MEDICINE

## 2020-09-11 PROCEDURE — 37000008 HC ANESTHESIA 1ST 15 MINUTES: Performed by: INTERNAL MEDICINE

## 2020-09-11 PROCEDURE — 37000009 HC ANESTHESIA EA ADD 15 MINS: Performed by: INTERNAL MEDICINE

## 2020-09-11 PROCEDURE — 45382 PR COLONOSCOPY,FLEX,W/CONTROL, BLEEDING: ICD-10-PCS | Mod: 59,,, | Performed by: INTERNAL MEDICINE

## 2020-09-11 PROCEDURE — 25000003 PHARM REV CODE 250: Performed by: NURSE ANESTHETIST, CERTIFIED REGISTERED

## 2020-09-11 PROCEDURE — 88305 TISSUE EXAM BY PATHOLOGIST: CPT | Mod: 59 | Performed by: PATHOLOGY

## 2020-09-11 RX ORDER — PROPOFOL 10 MG/ML
VIAL (ML) INTRAVENOUS
Status: DISCONTINUED | OUTPATIENT
Start: 2020-09-11 | End: 2020-09-11

## 2020-09-11 RX ORDER — LIDOCAINE HYDROCHLORIDE 10 MG/ML
INJECTION, SOLUTION EPIDURAL; INFILTRATION; INTRACAUDAL; PERINEURAL
Status: DISCONTINUED | OUTPATIENT
Start: 2020-09-11 | End: 2020-09-11

## 2020-09-11 RX ORDER — SODIUM CHLORIDE, SODIUM LACTATE, POTASSIUM CHLORIDE, CALCIUM CHLORIDE 600; 310; 30; 20 MG/100ML; MG/100ML; MG/100ML; MG/100ML
INJECTION, SOLUTION INTRAVENOUS CONTINUOUS PRN
Status: DISCONTINUED | OUTPATIENT
Start: 2020-09-11 | End: 2020-09-11

## 2020-09-11 RX ORDER — SODIUM CHLORIDE, SODIUM LACTATE, POTASSIUM CHLORIDE, CALCIUM CHLORIDE 600; 310; 30; 20 MG/100ML; MG/100ML; MG/100ML; MG/100ML
INJECTION, SOLUTION INTRAVENOUS CONTINUOUS
Status: DISCONTINUED | OUTPATIENT
Start: 2020-09-11 | End: 2020-09-11 | Stop reason: HOSPADM

## 2020-09-11 RX ADMIN — SODIUM CHLORIDE, SODIUM LACTATE, POTASSIUM CHLORIDE, AND CALCIUM CHLORIDE: 600; 310; 30; 20 INJECTION, SOLUTION INTRAVENOUS at 09:09

## 2020-09-11 RX ADMIN — PROPOFOL 30 MG: 10 INJECTION, EMULSION INTRAVENOUS at 09:09

## 2020-09-11 RX ADMIN — LIDOCAINE HYDROCHLORIDE 50 MG: 10 INJECTION, SOLUTION EPIDURAL; INFILTRATION; INTRACAUDAL; PERINEURAL at 09:09

## 2020-09-11 RX ADMIN — PROPOFOL 30 MG: 10 INJECTION, EMULSION INTRAVENOUS at 10:09

## 2020-09-11 RX ADMIN — PROPOFOL 40 MG: 10 INJECTION, EMULSION INTRAVENOUS at 10:09

## 2020-09-11 RX ADMIN — PROPOFOL 50 MG: 10 INJECTION, EMULSION INTRAVENOUS at 09:09

## 2020-09-11 NOTE — H&P
PRE PROCEDURE H&P    Patient Name: Sharri House  MRN: [id number]  : 1960  Date of Procedure:  2020  Referring Physician: Edna Garrison MA  Primary Physician: Ar Brandt MD  Procedure Physician: Pauline Ricketts MD       Planned Procedure: Colonoscopy  Diagnosis: screening for colon cancer  Chief Complaint: Same as above    HPI: Patient is an 59 y.o. female is here for the above. She is asymptomatic.     Last colonoscopy: none  Family history: none  Anticoagulation: none    Past Medical History:   Past Medical History:   Diagnosis Date    Arthritis     GERD (gastroesophageal reflux disease)         Past Surgical History:  Past Surgical History:   Procedure Laterality Date    ABDOMINAL SURGERY       SECTION      HIP SURGERY      HYSTERECTOMY          Home Medications:  Prior to Admission medications    Medication Sig Start Date End Date Taking? Authorizing Provider   DULoxetine (CYMBALTA) 30 MG capsule Take 1 capsule (30 mg total) by mouth once daily. 20 Yes Inocente Wong MD   furosemide (LASIX) 20 MG tablet Take 1 tablet (20 mg total) by mouth once daily. 20  Yes Inocente Wong MD   hydroxyzine HCL (ATARAX) 25 MG tablet Take 1 tablet (25 mg total) by mouth nightly as needed for Itching. 20  Yes Andrew Brown MD   mupirocin (BACTROBAN) 2 % ointment Apply topically 3 (three) times daily. 20  Yes Ar Brandt MD   potassium 99 mg Tab Take 1 tablet by mouth once daily.   Yes Historical Provider, MD   sulfamethoxazole-trimethoprim 800-160mg (BACTRIM DS) 800-160 mg Tab Take 1 tablet by mouth 2 (two) times daily. 20  Yes Ar Brandt MD   ibuprofen (ADVIL,MOTRIN) 200 MG tablet Take 200 mg by mouth every 6 (six) hours as needed for Pain.    Historical Provider, MD   sodium,potassium,mag sulfates (SUPREP BOWEL PREP KIT) 17.5-3.13-1.6 gram SolR Take 177 mLs by mouth once daily. for 2 doses 20  Sean Teague,  PA-C   triamcinolone acetonide 0.1% (KENALOG) 0.1 % cream Apply topically 2 (two) times daily as needed. For itch. 5/19/20   Andrew Brown MD        Allergies:  Review of patient's allergies indicates:  No Known Allergies     Social History:   Social History     Socioeconomic History    Marital status:      Spouse name: Not on file    Number of children: Not on file    Years of education: Not on file    Highest education level: Not on file   Occupational History    Not on file   Social Needs    Financial resource strain: Not hard at all    Food insecurity     Worry: Never true     Inability: Never true    Transportation needs     Medical: No     Non-medical: No   Tobacco Use    Smoking status: Current Every Day Smoker     Packs/day: 0.25     Types: Cigarettes    Smokeless tobacco: Never Used   Substance and Sexual Activity    Alcohol use: Yes     Frequency: 2-3 times a week     Drinks per session: 1 or 2     Binge frequency: Never     Comment: occasional     Drug use: Never    Sexual activity: Yes     Partners: Male     Birth control/protection: None   Lifestyle    Physical activity     Days per week: 0 days     Minutes per session: 30 min    Stress: Only a little   Relationships    Social connections     Talks on phone: Three times a week     Gets together: Never     Attends Hoahaoism service: Not on file     Active member of club or organization: No     Attends meetings of clubs or organizations: Never     Relationship status:    Other Topics Concern    Are you pregnant or think you may be? Not Asked    Breast-feeding Not Asked   Social History Narrative    Not on file       Family History:  History reviewed. No pertinent family history.    ROS: No acute cardiac events, no acute respiratory complaints.     Physical Exam (all patients):    BP (!) 148/77 (BP Location: Left arm, Patient Position: Lying)   Pulse 72   Temp 97.9 °F (36.6 °C) (Temporal)   Resp 18   Wt 100.1 kg (220  lb 10.9 oz)   BMI 36.72 kg/m²   Lungs: Clear to auscultation bilaterally, respirations unlabored  Heart: Regular rate and rhythm, S1 and S2 normal, no obvious murmurs  Abdomen:         Soft, non-tender, bowel sounds normal, no masses, no organomegaly    Lab Results   Component Value Date    WBC 4.89 02/03/2020    MCV 85 02/03/2020    RDW 12.4 02/03/2020     02/03/2020    INR 1.0 10/15/2009     (H) 06/09/2020    BUN 12 06/09/2020     06/09/2020    K 4.6 06/09/2020     06/09/2020        SEDATION PLAN: per anesthesia      History reviewed, vital signs satisfactory, cardiopulmonary status satisfactory, sedation options, risks and plans have been discussed with the patient  All their questions were answered and the patient agrees to the sedation procedures as planned and the patient is deemed an appropriate candidate for the sedation as planned.    The risks, benefits and alternatives of the procedure were discussed with the patient in detail. This discussion was had in the presence of endoscopy staff. The risks include, risks of adverse reaction to sedation requiring the use of reversal agents, bleeding requiring blood transfusion, perforation requiring surgical intervention and technical failure. Other risks include aspiration leading to respiratory distress and respiratory failure resulting in endotracheal intubation and mechanical ventilation including death. If anesthesia is being utilized for this procedure, it is up to the anesthesiologist to determine airway safety including elective endotracheal intubation. Questions were answered, they agree to proceed. There was no language barriers.     Procedure explained to patient, informed consent obtained and placed in chart.    Pauline Ricketts  9/11/2020  9:42 AM

## 2020-09-11 NOTE — DISCHARGE INSTRUCTIONS
Diverticulosis    Diverticulosis means that small pouches have formed in the wall of your large intestine (colon). Most often, this problem causes no symptoms and is common as people age. But the pouches in the colon are at risk of becoming infected. When this happens, the condition is called diverticulitis. Although most people with diverticulosis never develop diverticulitis, it is still not uncommon. Rectal bleeding can also occur and in less common situations, a type of colon inflammation called colitis.  While most people do not have symptoms, some people with diverticulosis may have:  · Abdominal cramps and pain  · Bloating  · Constipation  · Change in bowel habits  Causes  The exact cause of diverticulosis (and diverticulitis) has not been proved, but a few things are associated with the condition:  · Low-fiber diet  · Constipation  · Lack of exercise  Your healthcare provider will talk with you about how to manage your condition. Diet changes may be all that are needed to help control diverticulosis and prevent progression to diverticulitis. If you develop diverticulitis, you will likely need other treatments.  Home care  You may be told to take fiber supplements daily. Fiber adds bulk to the stool so that it passes through the colon more easily. Stool softeners may be recommended. You may also be given medications for pain relief. Be sure to take all medications as directed.  In the past, people were told to avoid corn, nuts, and seeds. This is no longer necessary.  Follow these guidelines when caring for yourself at home:  · Eat unprocessed foods that are high in fiber. Whole grains, fruits, and vegetables are good choices.  · Drink 6 to 8 glasses of water every day unless your healthcare provider has you limit how much fluid you should have.  · Watch for changes in your bowel movements. Tell your provider if you notice any changes.  · Begin an exercise program. Ask your provider how to get started.  Generally, walking is the best.  · Get plenty of rest and sleep.  Follow-up care  Follow up with your healthcare provider, or as advised. Regular visits may be needed to check on your health. Sometimes special procedures such as colonoscopy, are needed after an episode of diverticulitis or blooding. Be sure to keep all your appointments.  If a stool sample was taken, or cultures were done, you should be told if they are positive, or if your treatment needs to be changed. You can call as directed for the results.  If X-rays were done, a radiologist will look at them. You will be told if there is a change in your treatment.  If antibiotics were prescribed, be sure to finish them all.  When to seek medical advice  Call your healthcare provider right away if any of these occur:  · Fever of 100.4°F (38°C) or higher, or as directed by your healthcare provider  · Severe cramps in the lower left side of the abdomen or pain that is getting worse  · Tenderness in the lower left side of the abdomen or worsening pain throughout the abdomen  · Diarrhea or constipation that doesn't get better within 24 hours  · Nausea and vomiting  · Bleeding from the rectum  Call 911  Call emergency services if any of the following occur:  · Trouble breathing  · Confusion  · Very drowsy or trouble awakening  · Fainting or loss of consciousness  · Rapid heart rate  · Chest pain  Date Last Reviewed: 12/30/2015 © 2000-2017 [employer]. [address] [place] [address]. All rights reserved. This information is not intended as a substitute for professional medical care. Always follow your healthcare professional's instructions.        Understanding Colon and Rectal Polyps    The colon (also called the large intestine) is a muscular tube that forms the last part of the digestive tract. It absorbs water and stores food waste. The colon is about 4 to 6 feet long. The rectum is the last 6 inches of the colon. The colon and rectum  have a smooth lining composed of millions of cells. Changes in these cells can lead to growths in the colon that can become cancerous and should be removed. Multiple tests are available to screen for colon cancer, but the colonoscopy is the most recommended test. During colonoscopy, these polyps can be removed. How often you need this test depends on many things including your condition, your family history, symptoms, and what the findings were at the previous colonoscopy.   When the colon lining changes  Changes that happen in the cells that line the colon or rectum can lead to growths called polyps. Over a period of years, polyps can turn cancerous. Removing polyps early may prevent cancer from ever forming.  Polyps  Polyps are fleshy clumps of tissue that form on the lining of the colon or rectum. Small polyps are usually benign (not cancerous). However, over time, cells in a polyp can change and become cancerous. Certain types of polyps known as adenomatous polyps are premalignant. The risk for invasive cancer increases with the size of the polyp and certain cell and gene features. This means that they can become cancerous if they're not removed. Hyperplastic polyps are benign. They can grow quite large and not turn cancerous.   Cancer  Almost all colorectal cancers start when polyp cells begin growing abnormally. As a cancerous tumor grows, it may involve more and more of the colon or rectum. In time, cancer can also grow beyond the colon or rectum and spread to nearby organs or to glands called lymph nodes. The cells can also travel to other parts of the body. This is known as metastasis. The earlier a cancerous tumor is removed, the better the chance of preventing its spread.    Date Last Reviewed: 8/1/2016  © 8051-8765 [employer]. [address] [place] [address], [place] [address]. All rights reserved. This information is not intended as a substitute for professional medical care. Always follow your  healthcare professional's instructions.        Hemorrhoids    Hemorrhoids are swollen and inflamed veins inside the rectum and near the anus. The rectum is the last several inches of the colon. The anus is the passage between the rectum and the outside of the body.  Causes  The veins can become swollen due to increased pressure in them. This is most often caused by:  · Chronic constipation or diarrhea  · Straining when having a bowel movement  · Sitting too long on the toilet  · A low-fiber diet  · Pregnancy  Symptoms  · Bleeding from the rectum (this may be noticeable after bowel movements)  · Lump near the anus  · Itching around the anus  · Pain around the anus  There are different types of hemorrhoids. Depending on the type you have and the severity, you may be able to treat yourself at home. In some cases, a procedure may be the best treatment option. Your healthcare provider can tell you more about this, if needed.  Home care  General care  · To get relief from pain or itching, try:  ¨ Topical products. Your healthcare provider may prescribe or recommend creams, ointments, or pads that can be applied to the hemorrhoid. Use these exactly as directed.  ¨ Medicines. Your healthcare provider may recommend stool softeners, suppositories, or laxatives to help manage constipation. Use these exactly as directed.  ¨ Sitz baths. A sitz bath involves sitting in a few inches of warm bath water. Be careful not to make the water so hot that you burn yourself--test it before sitting in it. Soak for about 10 to 15 minutes a few times a day. This may help relieve pain.  Tips to help prevent hemorrhoids  · Eat more fiber. Fiber adds bulk to stool and absorbs water as it moves through your colon. This makes stool softer and easier to pass.  ¨ Increase the fiber in your diet with more fiber-rich foods. These include fresh fruit, vegetables, and whole grains.  ¨ Take a fiber supplement or bulking agent, if advised to by your  provider. These include products such as psyllium or methylcellulose.  · Drink plenty of water, if directed to by your provider. This can help keep stool soft.  · Be more active. Frequent exercise aids digestion and helps prevent constipation. It may also help make bowel movements more regular.  · Dont strain during bowel movements. This can make hemorrhoids more likely. Also, dont sit on the toilet for long periods of time.  Follow-up care  Follow up with your healthcare provider, or as advised. If a culture or imaging tests were done, you will be notified of the results when they are ready. This may take a few days or longer.  When to seek medical advice  Call your healthcare provider right away if any of these occur:  · Increased bleeding from the rectum  · Increased pain around the rectum or anus  · Weakness or dizziness  Call 911  Call 911 or return to the emergency department right away if any of these occur:  · Trouble breathing or swallowing  · Fainting or loss of consciousness  · Unusually fast heart rate  · Vomiting blood  · Large amounts of blood in stool  Date Last Reviewed: 6/22/2015  © 8711-3871 [employer], [employer] [address] [place] [address], [place] [address]. All rights reserved. This information is not intended as a substitute for professional medical care. Always follow your healthcare professional's instructions.

## 2020-09-11 NOTE — ANESTHESIA RELEASE NOTE
Anesthesia Release from PACU Note    Patient: Sharri House    Procedure(s) Performed: Procedure(s) (LRB):  COLONOSCOPY (N/A)    Anesthesia type: MAC    Post pain: Adequate analgesia    Post assessment: no apparent anesthetic complications and tolerated procedure well    Last Vitals:   Visit Vitals  BP (!) 148/77 (BP Location: Left arm, Patient Position: Lying)   Pulse 72   Temp 36.6 °C (97.9 °F) (Temporal)   Resp 18   Wt 100.1 kg (220 lb 10.9 oz)   BMI 36.72 kg/m²       Post vital signs: stable    Level of consciousness: awake, alert  and oriented    Nausea/Vomiting: no nausea/no vomiting    Complications: none    Airway Patency: patent    Respiratory: unassisted, spontaneous ventilation, room air    Cardiovascular: stable and blood pressure at baseline    Hydration: euvolemic

## 2020-09-11 NOTE — TRANSFER OF CARE
Anesthesia Transfer of Care Note    Patient: Sharri House    Procedure(s) Performed: Procedure(s) (LRB):  COLONOSCOPY (N/A)    Patient location: GI    Anesthesia Type: MAC    Transport from OR: Transported from OR on room air with adequate spontaneous ventilation    Post pain: adequate analgesia    Post assessment: no apparent anesthetic complications and tolerated procedure well    Post vital signs: stable    Level of consciousness: awake, alert and oriented    Nausea/Vomiting: no nausea/vomiting    Complications: none    Transfer of care protocol was followed      Last vitals:   Visit Vitals  BP (!) 148/77 (BP Location: Left arm, Patient Position: Lying)   Pulse 72   Temp 36.6 °C (97.9 °F) (Temporal)   Resp 18   Wt 100.1 kg (220 lb 10.9 oz)   BMI 36.72 kg/m²

## 2020-09-11 NOTE — [employer] PATIENT INSTRUCTIONS
Discharge Summary/Instructions after an Endoscopic Procedure  Patient Name: Sharri House  Patient MRN: [id number]  Patient YOB: 1960  Friday, September 11, 2020 Pauline Ricketts MD  RESTRICTIONS:  During your procedure today, you received medications for sedation.  These   medications may affect your judgment, balance and coordination.  Therefore,   for 24 hours, you have the following restrictions:   - DO NOT drive a car, operate machinery, make legal/financial decisions,   sign important papers or drink alcohol.    ACTIVITY:  Today: no heavy lifting, straining or running due to procedural   sedation/anesthesia.  The following day: return to full activity including work.  DIET:  Eat and drink normally unless instructed otherwise.     TREATMENT FOR COMMON SIDE EFFECTS:  - Mild abdominal pain, nausea, belching, bloating or excessive gas:  rest,   eat lightly and use a heating pad.  - Sore Throat: treat with throat lozenges and/or gargle with warm salt   water.  - Because air was used during the procedure, expelling large amounts of air   from your rectum or belching is normal.  - If a bowel prep was taken, you may not have a bowel movement for 1-3 days.    This is normal.  SYMPTOMS TO WATCH FOR AND REPORT TO YOUR PHYSICIAN:  1. Abdominal pain or bloating, other than gas cramps.  2. Chest pain.  3. Back pain.  4. Signs of infection such as: chills or fever occurring within 24 hours   after the procedure.  5. Rectal bleeding, which would show as bright red, maroon, or black stools.   (A tablespoon of blood from the rectum is not serious, especially if   hemorrhoids are present.)  6. Vomiting.  7. Weakness or dizziness.  GO DIRECTLY TO THE NEAREST EMERGENCY ROOM IF YOU HAVE ANY OF THE FOLLOWING:      Difficulty breathing              Chills and/or fever over 101 F   Persistent vomiting and/or vomiting blood   Severe abdominal pain   Severe chest pain   Black, tarry stools   Bleeding- more than one  tablespoon   Any other symptom or condition that you feel may need urgent attention  Your doctor recommends these additional instructions:  If any biopsies were taken, your doctors clinic will contact you in 1 to 2   weeks with any results.  - Discharge patient to home (with escort).   - Resume previous diet.   - Continue present medications.   - Await pathology results.   - No aspirin, ibuprofen, naproxen, or other non-steroidal anti-inflammatory   drugs for 3 weeks after polyp removal.   - Repeat colonoscopy in 3 - 5 years for surveillance based on pathology   results.   - Return to primary care physician.  For questions, problems or results please call your physician Pauline Ricketts MD at Work:  [phone]  If you have any questions about the above instructions, call the GI   department at [phone] or call the endoscopy unit at [phone]   from 7am until 3 pm.  [facility] [place], EMERGENCY ROOM PHONE NUMBER:   [phone]  IF A COMPLICATION OR EMERGENCY SITUATION ARISES AND YOU ARE UNABLE TO REACH   YOUR PHYSICIAN - GO DIRECTLY TO THE EMERGENCY ROOM.  I have read or have had read to me these discharge instructions for my   procedure and have received a written copy.  I understand these   instructions and will follow-up with my physician if I have any questions.     __________________________________       _____________________________________  Nurse Signature                                          Patient/Designated   Responsible Party Signature  MD Pauline Dumont MD  9/11/2020 10:47:00 AM  This report has been verified and signed electronically.  [employer]

## 2020-09-11 NOTE — ANESTHESIA RELEASE NOTE
Anesthesia Release from PACU Note    Patient: Sharri House    Procedure(s) Performed: Procedure(s) (LRB):  COLONOSCOPY (N/A)    Anesthesia type: MAC    Post pain: Adequate analgesia    Post assessment: no apparent anesthetic complications and tolerated procedure well    Last Vitals:   Visit Vitals  /60 (BP Location: Left arm, Patient Position: Lying)   Pulse 65   Temp 36.2 °C (97.2 °F) (Skin)   Resp 20   Wt 100.1 kg (220 lb 10.9 oz)   SpO2 (!) 29%   BMI 36.72 kg/m²       Post vital signs: stable    Level of consciousness: awake, alert  and oriented    Nausea/Vomiting: no nausea/no vomiting    Complications: none    Airway Patency: patent    Respiratory: unassisted, spontaneous ventilation, room air    Cardiovascular: stable and blood pressure at baseline    Hydration: euvolemic

## 2020-09-11 NOTE — ANESTHESIA PREPROCEDURE EVALUATION
09/11/2020  Sharri House is a 59 y.o., female.    Anesthesia Evaluation    I have reviewed the Patient Summary Reports.    I have reviewed the Nursing Notes. I have reviewed the NPO Status.   I have reviewed the Medications.     Review of Systems  Anesthesia Hx:  No problems with previous Anesthesia    Social:  Smoker, Social Alcohol Use 1/3 pack per day   Hematology/Oncology:  Hematology Normal   Oncology Normal     EENT/Dental:EENT/Dental Normal   Cardiovascular:  Cardiovascular Normal     Pulmonary:  Pulmonary Normal    Renal/:  Renal/ Normal     Hepatic/GI:   Bowel Prep. Hiatal Hernia, GERD, well controlled Hepatitis, C    Musculoskeletal:   Arthritis   Spine Disorders: lumbar Chronic Pain and Degenerative disease    Neurological:   Peripheral Neuropathy    Endocrine:  Endocrine Normal    Dermatological:  Skin Normal    Psych:  Psychiatric Normal           Physical Exam  General:  Well nourished    Airway/Jaw/Neck:  Airway Findings: Mouth Opening: Normal Tongue: Normal  General Airway Assessment: Adult  Mallampati: II  Jaw/Neck Findings:  Neck ROM: Normal ROM      Dental:  Dental Findings: In tact    Chest/Lungs:  Chest/Lungs Findings: Clear to auscultation, Normal Respiratory Rate     Heart/Vascular:  Heart Findings: Rate: Normal  Rhythm: Regular Rhythm  Sounds: Normal        Mental Status:  Mental Status Findings:  Cooperative, Alert and Oriented         Anesthesia Plan  Type of Anesthesia, risks & benefits discussed:  Anesthesia Type:  MAC  Patient's Preference:   Intra-op Monitoring Plan: standard ASA monitors  Intra-op Monitoring Plan Comments:   Post Op Pain Control Plan:   Post Op Pain Control Plan Comments:   Induction:   IV  Beta Blocker:  Patient is not currently on a Beta-Blocker (No further documentation required).       Informed Consent: Patient understands risks and agrees with  Anesthesia plan.  Questions answered. Anesthesia consent signed with patient.  ASA Score: 2     Day of Surgery Review of History & Physical: I have interviewed and examined the patient. I have reviewed the patient's H&P dated:  There are no significant changes.          Ready For Surgery From Anesthesia Perspective.

## 2020-09-11 NOTE — ANESTHESIA POSTPROCEDURE EVALUATION
Anesthesia Post Evaluation    Patient: Sharri House    Procedure(s) Performed: Procedure(s) (LRB):  COLONOSCOPY (N/A)    Final Anesthesia Type: MAC    Patient location during evaluation: GI PACU  Patient participation: Yes- Able to Participate  Level of consciousness: awake and alert and oriented  Post-procedure vital signs: reviewed and stable  Pain management: adequate  Airway patency: patent  RAFFAELE mitigation strategies: Multimodal analgesia  PONV status at discharge: No PONV  Anesthetic complications: no      Cardiovascular status: hemodynamically stable  Respiratory status: unassisted, spontaneous ventilation and room air  Hydration status: euvolemic  Follow-up not needed.          Vitals Value Taken Time   /60 09/11/20 1030   Temp 36.2 °C (97.2 °F) 09/11/20 1030   Pulse 65 09/11/20 1030   Resp 20 09/11/20 1030   SpO2 29 % 09/11/20 1030         No case tracking events are documented in the log.      Pain/Vicente Score: Vicente Score: 10 (9/11/2020 10:30 AM)

## 2020-09-11 NOTE — PLAN OF CARE
DR FLOREZ AT BEDSIDE TO SPEAK TO PT. REGARDING RESULTS.  VSS, NO GI BLEEDING, NO ABD. PAIN, NO N/V. PT. DISCHARGED FROM UNIT.

## 2020-09-18 ENCOUNTER — PATIENT MESSAGE (OUTPATIENT)
Dept: PAIN MEDICINE | Facility: CLINIC | Age: 60
End: 2020-09-18

## 2020-09-18 DIAGNOSIS — R60.0 PEDAL EDEMA: ICD-10-CM

## 2020-09-21 ENCOUNTER — PATIENT MESSAGE (OUTPATIENT)
Dept: ADMINISTRATIVE | Facility: OTHER | Age: 60
End: 2020-09-21

## 2020-09-21 DIAGNOSIS — R60.0 PEDAL EDEMA: ICD-10-CM

## 2020-09-21 RX ORDER — FUROSEMIDE 20 MG/1
20 TABLET ORAL DAILY
Qty: 30 TABLET | Refills: 2 | Status: SHIPPED | OUTPATIENT
Start: 2020-09-21 | End: 2021-01-29 | Stop reason: SDUPTHER

## 2020-09-21 RX ORDER — FUROSEMIDE 20 MG/1
20 TABLET ORAL DAILY
Qty: 30 TABLET | Refills: 2 | OUTPATIENT
Start: 2020-09-21

## 2020-09-21 NOTE — PRE-PROCEDURE INSTRUCTIONS
Spoke with patient regarding procedure scheduled on 9/24    Arrival time 1200    Has patient been sick with fever or on antibiotics within the last 7 days? No    Has patient received a vaccination within the last 7 days? no    Has the patient stopped all medications as directed? Na    Does patient have a pacemaker and or defibrillator? no    Does the patient have a ride to and from procedure and someone reliable to remain with patient? Bruno [phone]    Is the patient diabetic? no    Does the patient have sleep apnea? Or use O2 at home? No and no     Is the patient receiving sedation? yes    Is the patient instructed to remain NPO beginning at midnight the night before their procedure? yes    Procedure location confirmed with patient? Yes    Covid- Denies signs/symptoms. Instructed to notify PAT/MD if any changes.

## 2020-09-22 ENCOUNTER — PATIENT MESSAGE (OUTPATIENT)
Dept: GASTROENTEROLOGY | Facility: CLINIC | Age: 60
End: 2020-09-22

## 2020-09-22 LAB
FINAL PATHOLOGIC DIAGNOSIS: NORMAL
GROSS: NORMAL

## 2020-09-22 NOTE — PROGRESS NOTES
AN staff: inform patient that polyps removed are precancerous or adenomas. They were completely removed. Guidelines recommend repeating colonoscopy in 3 years. Place in recall.

## 2020-09-24 ENCOUNTER — HOSPITAL ENCOUNTER (OUTPATIENT)
Facility: HOSPITAL | Age: 60
Discharge: HOME | End: 2020-09-24
Attending: PAIN MEDICINE | Admitting: PAIN MEDICINE
Payer: COMMERCIAL

## 2020-09-24 VITALS
DIASTOLIC BLOOD PRESSURE: 72 MMHG | WEIGHT: 223.13 LBS | RESPIRATION RATE: 16 BRPM | SYSTOLIC BLOOD PRESSURE: 141 MMHG | OXYGEN SATURATION: 95 % | HEIGHT: 65 IN | BODY MASS INDEX: 37.18 KG/M2 | TEMPERATURE: 98 F | HEART RATE: 65 BPM

## 2020-09-24 DIAGNOSIS — M47.816 LUMBAR SPONDYLOSIS: Primary | ICD-10-CM

## 2020-09-24 PROCEDURE — 64491 PR INJ DX/THER AGNT PARAVERT FACET JOINT,IMG GUIDE,CERV/THORAC, 2ND LEVEL: ICD-10-PCS | Mod: 50,,, | Performed by: PAIN MEDICINE

## 2020-09-24 PROCEDURE — 63600175 PHARM REV CODE 636 W HCPCS: Performed by: PAIN MEDICINE

## 2020-09-24 PROCEDURE — 64490 INJ PARAVERT F JNT C/T 1 LEV: CPT | Mod: 50,,, | Performed by: PAIN MEDICINE

## 2020-09-24 PROCEDURE — 64495 INJ PARAVERT F JNT L/S 3 LEV: CPT | Mod: 50

## 2020-09-24 PROCEDURE — 25000003 PHARM REV CODE 250: Performed by: PAIN MEDICINE

## 2020-09-24 PROCEDURE — 64491 INJ PARAVERT F JNT C/T 2 LEV: CPT | Mod: 50,,, | Performed by: PAIN MEDICINE

## 2020-09-24 PROCEDURE — 64493 INJ PARAVERT F JNT L/S 1 LEV: CPT | Mod: 50 | Performed by: PAIN MEDICINE

## 2020-09-24 PROCEDURE — 64494 INJ PARAVERT F JNT L/S 2 LEV: CPT | Mod: 50 | Performed by: PAIN MEDICINE

## 2020-09-24 PROCEDURE — 64490 PR INJ DX/THER AGNT PARAVERT FACET JOINT,IMG GUIDE,CERV/THORAC, 1ST LEVEL: ICD-10-PCS | Mod: 50,,, | Performed by: PAIN MEDICINE

## 2020-09-24 RX ORDER — FENTANYL CITRATE 50 UG/ML
INJECTION, SOLUTION INTRAMUSCULAR; INTRAVENOUS
Status: DISCONTINUED | OUTPATIENT
Start: 2020-09-24 | End: 2020-09-24 | Stop reason: HOSPADM

## 2020-09-24 RX ORDER — MIDAZOLAM HYDROCHLORIDE 1 MG/ML
INJECTION, SOLUTION INTRAMUSCULAR; INTRAVENOUS
Status: DISCONTINUED | OUTPATIENT
Start: 2020-09-24 | End: 2020-09-24 | Stop reason: HOSPADM

## 2020-09-24 RX ORDER — LIDOCAINE HYDROCHLORIDE 20 MG/ML
INJECTION, SOLUTION EPIDURAL; INFILTRATION; INTRACAUDAL; PERINEURAL
Status: DISCONTINUED | OUTPATIENT
Start: 2020-09-24 | End: 2020-09-24 | Stop reason: HOSPADM
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
## 2020-09-24 NOTE — INTERVAL H&P NOTE
The patient has been examined and the H&P has been reviewed:    I concur with the findings and no changes have occurred since H&P was written.    Anesthesia/Surgery risks, benefits and alternative options discussed and understood by patient/family.          There are no hospital problems to display for this patient.     Oriented - self; Oriented - place; Oriented - time

## 2020-09-24 NOTE — DISCHARGE INSTRUCTIONS

## 2020-09-24 NOTE — OP NOTE
Procedure: Lumbar Medial Branch Block under Fluoroscopic Guidance    Side: bilateral     Level:  Sacral ala (Corresponding to the L5 dorsal ramus), L5 transverse process (Corresponding to the L4 medial branch) and L4 transverse process (Corresponding to the L3 medial branch)     PROCEDURE DATE: 9/24/2020    Pre-operative Diagnosis: Lumbar Spondylosis  Post-operative Diagnosis: Lumbar Spondylosis    Provider: BASHIR Barreto MD  Assistant(s): none    Anesthesia: Local, IV Sedation    >> 1 mg of VERSED    >> 50 mcg of FENTANYL     Indication: Low back pain without radiculopathy. Symptoms unresponsive to conservative treatments. Fluoroscopy was used to optimize visualization of needle placement and to maximize safety.     Procedure Description / Technique:  The patient was seen and identified in the preoperative area. Risks, benefits, complications, and alternatives were discussed with the patient. The patient agreed to proceed with the procedure and signed the consent. The site and side of the procedure was identified and marked. An iv was started.     The patient was taken to the procedural suite and positioned in prone orientation on the procedure table. A pillow was placed under the abdomen to reduce lumbar lordosis. A time out was performed. The procedure, site, side, and allergies were stated and agreed to by all present. The lumbosacral area was widely prepped with ChloraPrep. The procedural site was draped in usual sterile fashion. Vital signs were closely monitored throughout this procedure. Conscious sedation was used for this procedure to decrease patient anxiety.    The Left sacral ala and superior articular process was identified and marked on AP fluoroscopic imaging. Subcutaneous tissues were localized using 1% PF Lidocaine to improve patient comfort. A 22 gauge 5 inch spinal needle was advance until the needle rested on OS at the interface of the sacral ala and the base of the sacral superior articular  process. After negative aspiration, 1ml of 2% lidocaine was injected. No pain or paresthesia was noted on injection. After bilateral injection, the fluoroscope was rotated into the oblique view and the spinal needle was advanced towards the eye of the kelsie dog until os was contacted. 1ml of 2% lidocaine was injected after negative aspiration. No pain or paresthesia was noted. This technique was repeated at each of the above noted levels. The spinal needle was removed intact following injection at each targeted site. The stylet was replaced prior to needle removal at each site.    Description of Findings: Not applicable    Prosthetic devices, grafts, tissues, or devices implanted: None    Specimen Removed: No    ESTIMATED BLOOD LOSS: minimal    COMPLICATIONS: None    DISPOSITION / PLANS: The patient was transferred to the recovery area in a stable condition for observation. The patient was reexamined prior to discharge. There was no evidence of acute neurologic injury following the procedure.  Patient was discharged from the recovery room after meeting discharge criteria. Home discharge instructions were given to the patient by the staff.

## 2020-09-28 NOTE — DISCHARGE SUMMARY
[facility]  Short Stay  Discharge Summary    Admit Date: 9/24/2020    Discharge Date and Time: 9/24/2020  1:42 PM      Discharge Attending Physician: BASHIR Barreto MD     Hospital Course (synopsis of major diagnoses, care, treatment, and services provided during the course of the hospital stay): Patient was admitted to Pre-op where informed consent was signed.  The patient was then taken to the procedure suite where the procedure was performed.  The patient was then return to the Pre-Op area and discharge was performed.     Final Diagnoses:    Principal Problem: <principal problem not specified>   Secondary Diagnoses:   Active Hospital Problems    Diagnosis  POA    Lumbar spondylosis [M47.816]  Yes      Resolved Hospital Problems   No resolved problems to display.       Discharged Condition: good    Disposition: Home or Self Care    Follow up/Patient Instructions:    Medications:  Reconciled Home Medications:      Medication List      CONTINUE taking these medications    DULoxetine 30 MG capsule  Commonly known as: CYMBALTA  Take 1 capsule (30 mg total) by mouth once daily.     furosemide 20 MG tablet  Commonly known as: LASIX  Take 1 tablet (20 mg total) by mouth once daily.     hydrOXYzine HCL 25 MG tablet  Commonly known as: ATARAX  Take 1 tablet (25 mg total) by mouth nightly as needed for Itching.     mupirocin 2 % ointment  Commonly known as: BACTROBAN  Apply topically 3 (three) times daily.     potassium 99 mg Tab  Take 1 tablet by mouth once daily.     sulfamethoxazole-trimethoprim 800-160mg 800-160 mg Tab  Commonly known as: BACTRIM DS  Take 1 tablet by mouth 2 (two) times daily.     triamcinolone acetonide 0.1% 0.1 % cream  Commonly known as: KENALOG  Apply topically 2 (two) times daily as needed. For itch.          Discharge Procedure Orders   Diet general     Call MD for:  severe uncontrolled pain     Call MD for:  difficulty breathing, headache or visual disturbances     Call MD for:   redness, tenderness, or signs of infection (pain, swelling, redness, odor or green/yellow discharge around incision site)     Activity as tolerated

## 2020-09-29 ENCOUNTER — TELEPHONE (OUTPATIENT)
Dept: PAIN MEDICINE | Facility: CLINIC | Age: 60
End: 2020-09-29

## 2020-09-29 NOTE — TELEPHONE ENCOUNTER
Patient Name:___________Sharri House______________________MRN:  [id number]       underwent the following procedure:__________ Lumbar Medial Branch Block  _with______L. Mark Barreto MD on: ______________09/24/2020________ and  received _______0_____________% RELIEF.

## 2020-10-13 ENCOUNTER — PATIENT MESSAGE (OUTPATIENT)
Dept: PAIN MEDICINE | Facility: CLINIC | Age: 60
End: 2020-10-13

## 2020-10-13 ENCOUNTER — PATIENT MESSAGE (OUTPATIENT)
Dept: FAMILY MEDICINE | Facility: CLINIC | Age: 60
End: 2020-10-13

## 2020-10-19 ENCOUNTER — TELEPHONE (OUTPATIENT)
Dept: PAIN MEDICINE | Facility: CLINIC | Age: 60
End: 2020-10-19

## 2020-10-19 ENCOUNTER — PATIENT OUTREACH (OUTPATIENT)
Dept: ADMINISTRATIVE | Facility: OTHER | Age: 60
End: 2020-10-19

## 2020-10-20 ENCOUNTER — OFFICE VISIT (OUTPATIENT)
Dept: PAIN MEDICINE | Facility: CLINIC | Age: 60
End: 2020-10-20
Payer: COMMERCIAL

## 2020-10-20 VITALS
SYSTOLIC BLOOD PRESSURE: 138 MMHG | HEART RATE: 79 BPM | DIASTOLIC BLOOD PRESSURE: 81 MMHG | WEIGHT: 226.19 LBS | HEIGHT: 65 IN | BODY MASS INDEX: 37.69 KG/M2

## 2020-10-20 DIAGNOSIS — M47.816 LUMBAR SPONDYLOSIS: Primary | ICD-10-CM

## 2020-10-20 PROCEDURE — 99214 PR OFFICE/OUTPT VISIT, EST, LEVL IV, 30-39 MIN: ICD-10-PCS | Mod: S$GLB,,, | Performed by: PAIN MEDICINE

## 2020-10-20 PROCEDURE — 99999 PR PBB SHADOW E&M-EST. PATIENT-LVL III: CPT | Mod: PBBFAC,,, | Performed by: PAIN MEDICINE

## 2020-10-20 PROCEDURE — 99999 PR PBB SHADOW E&M-EST. PATIENT-LVL III: ICD-10-PCS | Mod: PBBFAC,,, | Performed by: PAIN MEDICINE

## 2020-10-20 PROCEDURE — 99214 OFFICE O/P EST MOD 30 MIN: CPT | Mod: S$GLB,,, | Performed by: PAIN MEDICINE

## 2020-10-20 RX ORDER — TRAMADOL HYDROCHLORIDE 50 MG/1
50 TABLET ORAL EVERY 8 HOURS PRN
Qty: 90 TABLET | Refills: 1 | Status: SHIPPED | OUTPATIENT
Start: 2020-10-20 | End: 2020-11-04
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
## 2020-10-20 NOTE — PATIENT INSTRUCTIONS
Will schedule for L5/S1 interlaminar epidural steroid injection  -we can start tramadol 50 mg tablets 3 times daily as needed  -can continue Motrin 800 mg tablets over-the-counter as needed  -have patient follow up in clinic 4 weeks after the injection
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
## 2020-10-22 ENCOUNTER — PATIENT MESSAGE (OUTPATIENT)
Dept: PAIN MEDICINE | Facility: HOSPITAL | Age: 60
End: 2020-10-22

## 2020-10-22 ENCOUNTER — OFFICE VISIT (OUTPATIENT)
Dept: FAMILY MEDICINE | Facility: CLINIC | Age: 60
End: 2020-10-22
Payer: COMMERCIAL

## 2020-10-22 VITALS
WEIGHT: 225.5 LBS | HEART RATE: 84 BPM | OXYGEN SATURATION: 96 % | HEIGHT: 65 IN | DIASTOLIC BLOOD PRESSURE: 72 MMHG | TEMPERATURE: 96 F | SYSTOLIC BLOOD PRESSURE: 130 MMHG | BODY MASS INDEX: 37.57 KG/M2

## 2020-10-22 DIAGNOSIS — R03.0 BLOOD PRESSURE ELEVATED WITHOUT HISTORY OF HTN: ICD-10-CM

## 2020-10-22 DIAGNOSIS — L08.9 RECURRENT INFECTION OF SKIN: Primary | ICD-10-CM

## 2020-10-22 DIAGNOSIS — M54.16 LUMBAR RADICULOPATHY: ICD-10-CM

## 2020-10-22 DIAGNOSIS — H10.31 ACUTE CONJUNCTIVITIS OF RIGHT EYE, UNSPECIFIED ACUTE CONJUNCTIVITIS TYPE: ICD-10-CM

## 2020-10-22 PROCEDURE — 99999 PR PBB SHADOW E&M-EST. PATIENT-LVL III: CPT | Mod: PBBFAC,,, | Performed by: FAMILY MEDICINE

## 2020-10-22 PROCEDURE — 99214 PR OFFICE/OUTPT VISIT, EST, LEVL IV, 30-39 MIN: ICD-10-PCS | Mod: S$GLB,,, | Performed by: FAMILY MEDICINE

## 2020-10-22 PROCEDURE — 99214 OFFICE O/P EST MOD 30 MIN: CPT | Mod: S$GLB,,, | Performed by: FAMILY MEDICINE

## 2020-10-22 PROCEDURE — 99999 PR PBB SHADOW E&M-EST. PATIENT-LVL III: ICD-10-PCS | Mod: PBBFAC,,, | Performed by: FAMILY MEDICINE

## 2020-10-22 RX ORDER — SULFAMETHOXAZOLE AND TRIMETHOPRIM 800; 160 MG/1; MG/1
1 TABLET ORAL 2 TIMES DAILY
Qty: 14 TABLET | Refills: 0 | Status: SHIPPED | OUTPATIENT
Start: 2020-10-22 | End: 2020-12-04

## 2020-10-22 RX ORDER — SULFACETAMIDE SODIUM 100 MG/ML
1 SOLUTION/ DROPS OPHTHALMIC
Qty: 5 ML | Refills: 0 | Status: SHIPPED | OUTPATIENT
Start: 2020-10-22 | End: 2024-01-29

## 2020-10-22 NOTE — PROGRESS NOTES
Subjective:       Patient ID: Sharri House is a 60 y.o. female.    Chief Complaint: Eye Pain and Recurrent Skin Infections      HPI Comments:       Current Outpatient Medications:     DULoxetine (CYMBALTA) 30 MG capsule, Take 1 capsule (30 mg total) by mouth once daily., Disp: 30 capsule, Rfl: 11    furosemide (LASIX) 20 MG tablet, Take 1 tablet (20 mg total) by mouth once daily., Disp: 30 tablet, Rfl: 2    sulfamethoxazole-trimethoprim 800-160mg (BACTRIM DS) 800-160 mg Tab, Take 1 tablet by mouth 2 (two) times daily., Disp: 28 tablet, Rfl: 0    traMADoL (ULTRAM) 50 mg tablet, Take 1 tablet (50 mg total) by mouth every 8 (eight) hours as needed. Greater than 7 day supply medically necessary., Disp: 90 tablet, Rfl: 1    hydroxyzine HCL (ATARAX) 25 MG tablet, Take 1 tablet (25 mg total) by mouth nightly as needed for Itching., Disp: 30 tablet, Rfl: 1    mupirocin (BACTROBAN) 2 % ointment, Apply topically 3 (three) times daily. (Patient not taking: Reported on 10/22/2020), Disp: 30 g, Rfl: 2    potassium 99 mg Tab, Take 1 tablet by mouth once daily., Disp: , Rfl:     sulfacetamide sodium 10% (BLEPH-10) 10 % ophthalmic solution, Place 1 drop into the right eye every 2 (two) hours., Disp: 5 mL, Rfl: 0    sulfamethoxazole-trimethoprim 800-160mg (BACTRIM DS) 800-160 mg Tab, Take 1 tablet by mouth 2 (two) times daily., Disp: 14 tablet, Rfl: 0    triamcinolone acetonide 0.1% (KENALOG) 0.1 % cream, Apply topically 2 (two) times daily as needed. For itch. (Patient not taking: Reported on 10/22/2020), Disp: 454 g, Rfl: 1      Right eye has been getting increasingly irritated and painful over the last 48 hr.  No itching.  Very crusty and swollen in the morning.  Not much weakness to the white of the.  No foreign body sensation.    Also developed a raised area on her forehead over the last few days.  Has been having recurrent staph infections in her axilla mostly over the last several months.  Using warm  "compresses.  Started some old Bactrim yesterday.  No fever.    Plans to have epidural injections in her lumbar spine next week.  Nerve block done in September was ineffective    Review of Systems   Constitutional: Negative for activity change, appetite change and fever.   HENT: Negative for sore throat.    Eyes: Positive for pain and discharge.   Respiratory: Negative for cough and shortness of breath.    Cardiovascular: Negative for chest pain.   Gastrointestinal: Negative for abdominal pain, diarrhea and nausea.   Genitourinary: Negative for difficulty urinating.   Musculoskeletal: Positive for back pain. Negative for arthralgias and myalgias.   Neurological: Negative for dizziness and headaches.       Objective:      Vitals:    10/22/20 1136   BP: 130/72   Pulse: 84   Temp: 96.4 °F (35.8 °C)   TempSrc: Tympanic   SpO2: 96%   Weight: 102.3 kg (225 lb 8.5 oz)   Height: 5' 5" (1.651 m)   PainSc:   8   PainLoc: Eye     Physical Exam  Vitals signs and nursing note reviewed.   Constitutional:       General: She is not in acute distress.     Appearance: She is well-developed. She is not diaphoretic.   HENT:      Head: Normocephalic.        Mouth/Throat:      Pharynx: No oropharyngeal exudate.   Eyes:      General: Lids are normal. Lids are everted, no foreign bodies appreciated.         Right eye: No discharge or hordeolum.      Extraocular Movements:      Right eye: Normal extraocular motion.      Left eye: Normal extraocular motion.      Conjunctiva/sclera:      Right eye: Right conjunctiva is not injected. No exudate.     Pupils: Pupils are equal, round, and reactive to light.      Comments: Mild periorbital swelling on the right   Neck:      Musculoskeletal: Neck supple.      Thyroid: No thyromegaly.   Cardiovascular:      Rate and Rhythm: Normal rate and regular rhythm.      Heart sounds: Normal heart sounds. No murmur.   Pulmonary:      Effort: Pulmonary effort is normal.      Breath sounds: Normal breath sounds. " No wheezing or rales.   Abdominal:      General: There is no distension.      Palpations: Abdomen is soft.   Lymphadenopathy:      Cervical: No cervical adenopathy.   Skin:     General: Skin is warm and dry.   Neurological:      Mental Status: She is alert and oriented to person, place, and time.   Psychiatric:         Behavior: Behavior normal.         Thought Content: Thought content normal.         Judgment: Judgment normal.         Assessment:       1. Recurrent infection of skin    2. Acute conjunctivitis of right eye, unspecified acute conjunctivitis type    3. Blood pressure elevated without history of HTN    4. Lumbar radiculopathy        Plan:   Recurrent infection of skin  Comments:  Bactrim double strength.  Warm compresses    Acute conjunctivitis of right eye, unspecified acute conjunctivitis type  Comments:  Bleph-10 drops 4 to 5 times a day until clear.  Follow-up with me or with ophthalmology if worsening pain    Blood pressure elevated without history of HTN  Comments:  Again normal today    Lumbar radiculopathy  Comments:  Planned MELANIE soon    Other orders  -     sulfacetamide sodium 10% (BLEPH-10) 10 % ophthalmic solution; Place 1 drop into the right eye every 2 (two) hours.  Dispense: 5 mL; Refill: 0  -     sulfamethoxazole-trimethoprim 800-160mg (BACTRIM DS) 800-160 mg Tab; Take 1 tablet by mouth 2 (two) times daily.  Dispense: 14 tablet; Refill: 0

## 2020-10-23 ENCOUNTER — OFFICE VISIT (OUTPATIENT)
Dept: PULMONOLOGY | Facility: CLINIC | Age: 60
End: 2020-10-23
Payer: COMMERCIAL

## 2020-10-23 ENCOUNTER — TELEPHONE (OUTPATIENT)
Dept: PULMONOLOGY | Facility: CLINIC | Age: 60
End: 2020-10-23

## 2020-10-23 VITALS
SYSTOLIC BLOOD PRESSURE: 116 MMHG | HEART RATE: 71 BPM | BODY MASS INDEX: 37.47 KG/M2 | DIASTOLIC BLOOD PRESSURE: 80 MMHG | WEIGHT: 224.88 LBS | RESPIRATION RATE: 16 BRPM | HEIGHT: 65 IN | TEMPERATURE: 98 F | OXYGEN SATURATION: 96 %

## 2020-10-23 DIAGNOSIS — R60.0 BILATERAL LOWER EXTREMITY EDEMA: Primary | ICD-10-CM

## 2020-10-23 DIAGNOSIS — J44.9 CHRONIC OBSTRUCTIVE PULMONARY DISEASE, UNSPECIFIED COPD TYPE: ICD-10-CM

## 2020-10-23 DIAGNOSIS — R29.818 SUSPECTED SLEEP APNEA: ICD-10-CM

## 2020-10-23 DIAGNOSIS — R06.83 SNORING: ICD-10-CM

## 2020-10-23 DIAGNOSIS — E66.09 CLASS 2 OBESITY DUE TO EXCESS CALORIES WITH BODY MASS INDEX (BMI) OF 37.0 TO 37.9 IN ADULT, UNSPECIFIED WHETHER SERIOUS COMORBIDITY PRESENT: ICD-10-CM

## 2020-10-23 PROCEDURE — 99999 PR PBB SHADOW E&M-EST. PATIENT-LVL V: CPT | Mod: PBBFAC,,, | Performed by: INTERNAL MEDICINE

## 2020-10-23 PROCEDURE — 99204 PR OFFICE/OUTPT VISIT, NEW, LEVL IV, 45-59 MIN: ICD-10-PCS | Mod: S$GLB,,, | Performed by: INTERNAL MEDICINE

## 2020-10-23 PROCEDURE — 99204 OFFICE O/P NEW MOD 45 MIN: CPT | Mod: S$GLB,,, | Performed by: INTERNAL MEDICINE

## 2020-10-23 PROCEDURE — 99999 PR PBB SHADOW E&M-EST. PATIENT-LVL V: ICD-10-PCS | Mod: PBBFAC,,, | Performed by: INTERNAL MEDICINE

## 2020-10-23 NOTE — LETTER
October 23, 2020      Ar Brandt MD  [address] [facility] [address]           [facility]  [address] [place] [id number]  Phone: [phone]  Fax: [phone]          Patient: Sharri House   MR Number: [id number]   YOB: 1960   Date of Visit: 10/23/2020       Dear Dr. Ar Brandt:    Thank you for referring Sharri House to me for evaluation. Attached you will find relevant portions of my assessment and plan of care.    If you have questions, please do not hesitate to call me. I look forward to following Sharri House along with you.    Sincerely,    James Arevalo MD    Enclosure  CC:  No Recipients    If you would like to receive this communication electronically, please contact [email or web address] or [phone] to request more information on [email or web address] Link access.    For providers and/or their staff who would like to refer a patient to [facility], please contact us through our one-stop-shop provider referral line, [facility], at [phone].    If you feel you have received this communication in error or would no longer like to receive these types of communications, please e-mail [email or web address]

## 2020-10-23 NOTE — PATIENT INSTRUCTIONS
General weight loss/lifestyle modification strategies discussed (elicit support from others; identify saboteurs; non-food rewards).  Diet interventions: low calorie (1000 kCal/d) deficit diet  No eating / drinking for 3 hours before going to bed.  Elevate head of bed 30 - 45 %     CPAP HABITUATION PROCEDURE     Lemuel Chatman, Ph.D., [facility] and Chris Hartley M.D.  [facility], [facility]     Some people have difficulty adjusting to CPAP/BiPAP/AutoCPAP.  This is not unusual or hard to understand: Breathing with CPAP is different from ordinary breathing, and this difference is aversive to some. The problem can be overcome, however, and the benefits CPAP confers are certainly worth the effort.  Below, you will find a simple and gradual way to get used to CPAP before you try to use it all night, every night.  The essence of this procedure is to relax and let breathing with CPAP become a habit.  It may take about 2 weeks, and involves the followin. CPAP while awake and comfortably seated, during the late evening.     2. CPAP in bed while attempting sleep at night.     3. If your discomfort is too great at any time, discontinue and attempt again later the same night, for the same amount of time.   4. You and your physician may alter the times and pressures if necessary.     5. If you find that it is very easy to get used to CPAP, you may start using it every night when you are comfortable enough to do so.  6. IMPORTANT REMINDER: If you have a cold or sinus congestion it is okay to miss a night or two of CPAP. Consider using antihistamines or decongestants to clear up your sinus congestion prior to sleeping.     DAYS  1-3   Start CPAP while awake and comfortably seated during the late evening, after having prepared for bed.  You may do this while watching television, listening to music or reading. Use for 1 hour, then take off CPAP and go directly to bed to sleep     DAYS   4-6     Start CPAP when you go to bed and use for 1 hour, or until you fall asleep.  If your discomfort is too great at any time, discontinue and attempt again later the same night, for the same designated amount of time (1 hour).      DAYS  7-9     Increase time with CPAP to 2 hours a night.  If your discomfort is too great at any time, discontinue and attempt again later the same night, for the same designated amount of time (2 hours).      DAYS 10-12    Increase time with CPAP to 3 hours a night. If your discomfort is too great at any time, discontinue and attempt again later the same night, for the same designated amount of time (3 hours).      DAYS 13-15     Sleep the entire night with CPAP.      OPTIONAL: You may use Progressive Muscle Relaxation (PMR) to help put you at ease when using CPAP; do PMR twice each day, once in the morning or afternoon, and once in the evening just before using CPAP. You may do PMR prior to any attempt until you are comfortable with CPAP.        Continuous Positive Air Pressure (CPAP)  Continuous positive air pressure (CPAP) uses gentle air pressure to hold the airway open. CPAP is often the most effective treatment for sleep apnea and severe snoring. It works very well for many people. But keep in mind that it can take several adjustments before the setup is right for you.       How CPAP Works  CPAP is a small portable pump beside the bed. The pump sends air through a hose, which is held over your nose and/or mouth by a mask. Mild air pressure is gently pushed through your airway. The air pressure nudges sagging tissues aside. This widens the airway so you can breathe better. CPAP may be combined with other kinds of therapy for sleep apnea.       Types of Air Pressure Treatments  There are different types of CPAP. Your doctor or CPAP technician will help you decide which type is best for you:  · Basic CPAP keeps the pressure constant all night long.  · A bilevel  device (BiPAP) provides more pressure when you breathe in and less when you breathe out. A BiPAP machine also may be set to provide automatic breaths to maintain breathing if you stop breathing while sleeping.  · An autoCPAP device automatically adjusts pressure throughout the night and in response to changes such as body position, sleep stage, and snoring.  © 9329-7435 [employer]. [address] [place] [address], [place] [address]. All rights reserved. This information is not intended as a substitute for professional medical care. Always follow your healthcare professional's instructions.        Snoring and Sleep Apnea: Notes for a Partner  Snoring and sleep apnea affect your life, as well as your partners. You can help in the treatment of the problem. Be supportive. Encourage your partner both to get treatment and to make the adjustments needed to follow through.       Adjusting to Changes  Your partners treatment may involve making changes to certain life habits. You can help your partner make and stick with these changes. For example:  · Support and even join your partners exercise program.  · Be supportive if your partner gets CPAP (continuous positive airway pressure). He or she may feel self-conscious at first. Remind your partner to expect adjustments to CPAP before it feels just right.  · Consider joining a snoring and sleep apnea support group.  Go Along to See the Health Care Provider  You can give the health care provider the best account of your partners nighttime breathing and snoring patterns. Try to go along to health care providers appointments. If you cant go, write notes for your partner to give to the health care provider. Describe your partners snoring and sleep breathing patterns in detail.  Tips for Sleeping with a Snorer  Until treatment takes care of your partners snoring:  · Try to go to bed first. It may help if youre already asleep when your partner starts to snore.  · Wear  earplugs to bed. A fan or other source of background noise may also help drown out snoring.   © 2637-3487 [employer]. [address] [place] [address], [place] [address]. All rights reserved. This information is not intended as a substitute for professional medical care. Always follow your healthcare professional's instructions.        Continuous Positive Airway Pressure (CPAP)  Your health care provider has prescribed continuous positive airway pressure (CPAP) therapy for you. A CPAP device helps you breathe better at night. The device delivers air through your nose or mouth when you breathe in to keep your air passages open. CPAP is:  · Used most often to treat sleep apnea and some other problems (Sleep apnea is a chronic condition with periods of sleep in which you briefly stop breathing.)  · Safe and very effective, but it takes time to get used to the mask.   Your health care provider, nurse, or medical supplier will give you tips for wearing and caring for your CPAP device.  General guidelines  · It's very important not to give up! It takes time to get used to wearing the mask at night.  · Practice using your CPAP device during the day, especially whenever you take a nap.  · Remember, there are several different types of masks. If you cant get used to your mask, ask your provider or medical supply company about trying another style.  · If you have nasal stuffiness or dryness when using your CPAP device, talk with your provider or medical supply company. There are ways to lessen these problems. For example, your provider may recommend moistening nasal spray or the medical supply company may recommend a device with a humidifier.  · The goal is to use your CPAP all night, every night, during all naps, and even when you travel.  · Keep your mask clean. Wash it with soap and water. Be sure to rinse the mask and tubing well with water to remove any soap. Let them air-dry thoroughly before using.  · Make  yourself comfortable when sleeping with CPAP. Try using extra pillows.  Work with your medical supply company so that you know how to correctly use your CPAP. Their representative will be able to help you:  · Use the CPAP correctly  · Troubleshoot any problems that come up  · Learn to clean and maintain the device  · Adjust to regular use of the CPAP  © 8534-9408 [employer]. [address] [place] [address], [place] [address]. All rights reserved. This information is not intended as a substitute for professional medical care. Always follow your healthcare professional's instructions.

## 2020-10-23 NOTE — PROGRESS NOTES
Initial Outpatient Pulmonary Evaluation       SUBJECTIVE:     Chief Complaint   Patient presents with    COPD       History of Present Illness:    Patient is a 60 y.o. female patient referred for evaluation and concerns about COPD.    Patient does have morning wheezing.  Denies sputum production coughing or hemoptysis.    Does complain of mild shortness of breath on exertion.    Smoked since age of 16 about 7 years 1 pack per day the rest was about 1-2 packs per week.    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      Complains of snoring.        STOP - BANG Questionnaire:     1. Snoring : Do you snore loudly ?    Yes    2. Tired : Do you often feel tired, fatigued, or sleepy during daytime? Yes    3. Observed: Has anyone observed you stop breathing during your sleep?   No     4. Blood pressure : Do you have or are you being treated for high blood pressure?   No    5. BMI :BMI more than 35 kg/m2?   Yes    6. Age : Age over 50 yr old?   Yes    7. Neck circumference:   For male, is your shirt collar 17 inches / 43cm or larger?  For female, is your shirt collar 16 inches / 41cm or larger?    Yes    8. Gender: Gender male?   No    STOP BANG SCORE 5    High risk of RAFFAELE: Yes 5 - 8  Intermediate risk of RAFFAELE: Yes 3 - 4  Low risk of RAFFAELE: Yes 0 - 2      References:   STOP Questionnaire   A Tool to Screen Patients for Obstructive Sleep Apnea: CEE Campos.C.P.C., YAMILEX Pardo.B.B.S., Yoseph Hung M.D.,Frances Zuniga, Ph.D., Donald Huitron M.B.B.S.,_ YAMILEX Littlejohn.Sc.,_ Luli Veliz M.D., ZOEY Farrell.R.C.P.C.; Anesthesiology 2008; 108:812-21 Copyright © 2008, the American Society of Anesthesiologists, Inc. [employer]    She was admitted to the hospital February 2020 for chest pain and bilateral lower extremity edema echo nuclear stress test and BMP were within normal.  Neck      Review of Systems   Constitutional: Positive for weight gain,  fatigue and weakness. Negative for fever and chills.   HENT: Negative for nosebleeds.    Eyes: Negative for redness.   Respiratory: Positive for shortness of breath, wheezing and dyspnea on extertion. Negative for choking.    Cardiovascular: Positive for chest pain and leg swelling.   Genitourinary: Negative for hematuria.   Endocrine: Negative for cold intolerance.    Musculoskeletal: Positive for arthralgias.   Skin: Negative for rash.   Gastrointestinal: Positive for acid reflux. Negative for vomiting.   Neurological: Negative for syncope.   Hematological: Negative for adenopathy.   Psychiatric/Behavioral: Positive for sleep disturbance. Negative for confusion.       Review of patient's allergies indicates:  No Known Allergies    Current Outpatient Medications   Medication Sig Dispense Refill    DULoxetine (CYMBALTA) 30 MG capsule Take 1 capsule (30 mg total) by mouth once daily. 30 capsule 11    furosemide (LASIX) 20 MG tablet Take 1 tablet (20 mg total) by mouth once daily. 30 tablet 2    sulfacetamide sodium 10% (BLEPH-10) 10 % ophthalmic solution Place 1 drop into the right eye every 2 (two) hours. 5 mL 0    sulfamethoxazole-trimethoprim 800-160mg (BACTRIM DS) 800-160 mg Tab Take 1 tablet by mouth 2 (two) times daily. 28 tablet 0    traMADoL (ULTRAM) 50 mg tablet Take 1 tablet (50 mg total) by mouth every 8 (eight) hours as needed. Greater than 7 day supply medically necessary. 90 tablet 1    hydroxyzine HCL (ATARAX) 25 MG tablet Take 1 tablet (25 mg total) by mouth nightly as needed for Itching. (Patient not taking: Reported on 10/23/2020) 30 tablet 1    mupirocin (BACTROBAN) 2 % ointment Apply topically 3 (three) times daily. (Patient not taking: Reported on 10/22/2020) 30 g 2    potassium 99 mg Tab Take 1 tablet by mouth once daily.      sulfamethoxazole-trimethoprim 800-160mg (BACTRIM DS) 800-160 mg Tab Take 1 tablet by mouth 2 (two) times daily. (Patient not taking: Reported on 10/23/2020) 14  "tablet 0    triamcinolone acetonide 0.1% (KENALOG) 0.1 % cream Apply topically 2 (two) times daily as needed. For itch. (Patient not taking: Reported on 10/22/2020) 454 g 1     No current facility-administered medications for this visit.        Past Medical History:   Diagnosis Date    Arthritis     GERD (gastroesophageal reflux disease)      Past Surgical History:   Procedure Laterality Date    ABDOMINAL SURGERY       SECTION      COLONOSCOPY N/A 2020    Procedure: COLONOSCOPY;  Surgeon: Pauline Ricketts MD;  Location: [facility] ENDO;  Service: Endoscopy;  Laterality: N/A;    HIP SURGERY      HYSTERECTOMY      INJECTION OF ANESTHETIC AGENT AROUND MEDIAL BRANCH NERVES INNERVATING LUMBAR FACET JOINT Bilateral 2020    Procedure: Bilateral L3-5 MBB;  Surgeon: Adarsh Barreto MD;  Location: [facility] PAIN MGT;  Service: Pain Management;  Laterality: Bilateral;     History reviewed. No pertinent family history.  Social History     Tobacco Use    Smoking status: Current Every Day Smoker     Packs/day: 0.25     Types: Cigarettes    Smokeless tobacco: Never Used   Substance Use Topics    Alcohol use: Yes     Frequency: 2-3 times a week     Drinks per session: 1 or 2     Binge frequency: Never     Comment: occasional     Drug use: Never          OBJECTIVE:     Vital Signs (Most Recent)  Vital Signs  Temp: 98.2 °F (36.8 °C)  Pulse: 71  Resp: 16  SpO2: 96 %  BP: 116/80  Height and Weight  Height: 5' 5" (165.1 cm)  Weight: 102 kg (224 lb 13.9 oz)  BSA (Calculated - sq m): 2.16 sq meters  BMI (Calculated): 37.4  Weight in (lb) to have BMI = 25: 149.9]  Wt Readings from Last 2 Encounters:   10/23/20 102 kg (224 lb 13.9 oz)   10/22/20 102.3 kg (225 lb 8.5 oz)         Physical Exam:  Physical Exam   Constitutional: She is oriented to person, place, and time. She appears well-developed and well-nourished. She is obese.   HENT:   Head: Normocephalic.   Neck: Neck supple.   Cardiovascular: Normal rate, regular " rhythm and normal heart sounds.   Pulmonary/Chest: Normal expansion. No stridor. No respiratory distress. She has decreased breath sounds. She exhibits no tenderness.   Abdominal: Soft.   Musculoskeletal:         General: No tenderness.   Lymphadenopathy:     She has no cervical adenopathy.   Neurological: She is alert and oriented to person, place, and time. Gait normal.   Skin: Skin is warm. No cyanosis. Nails show no clubbing.   Psychiatric: She has a normal mood and affect. Her behavior is normal. Judgment and thought content normal.   Nursing note and vitals reviewed.      Laboratory  Lab Results   Component Value Date    WBC 4.89 02/03/2020    RBC 4.93 02/03/2020    HGB 14.0 02/03/2020    HCT 42.1 02/03/2020    MCV 85 02/03/2020    MCH 28.4 02/03/2020    MCHC 33.3 02/03/2020    RDW 12.4 02/03/2020     02/03/2020    MPV 10.2 02/03/2020    GRAN 2.5 02/03/2020    GRAN 51.0 02/03/2020    LYMPH 1.7 02/03/2020    LYMPH 33.9 02/03/2020    MONO 0.5 02/03/2020    MONO 10.8 02/03/2020    EOS 0.2 02/03/2020    BASO 0.05 02/03/2020    EOSINOPHIL 3.1 02/03/2020    BASOPHIL 1.0 02/03/2020       BMP  Lab Results   Component Value Date     06/09/2020    K 4.6 06/09/2020     06/09/2020    CO2 24 06/09/2020    BUN 12 06/09/2020    CREATININE 0.9 06/09/2020    CALCIUM 9.6 06/09/2020    ANIONGAP 12 06/09/2020    ESTGFRAFRICA >60.0 06/09/2020    EGFRNONAA >60.0 06/09/2020    AST 23 02/03/2020    ALT 28 02/03/2020    PROT 7.2 02/03/2020       Lab Results   Component Value Date    BNP 14 02/03/2020       No results found for: TSH    Lab Results   Component Value Date    SEDRATE 4 02/19/2020       Lab Results   Component Value Date    CRP 2.2 02/19/2020       No results found for: IGE    No results found for: ASPERGILLUS  No results found for: AFUMIGATUSCL     No results found for: ACE    Diagnostic Results:  I have personally reviewed today the following studies :    Chest x-ray February 2020    The size and  contour of the heart are normal. The lungs are clear. There is no pneumothorax or pleural effusion    February 2020 nuclear stress test      The perfusion scan is free of evidence from myocardial ischemia or injury.    Gated perfusion images showed an ejection fraction of 77% at rest and 68% post stress.    The EKG portion of this study is negative for ischemia.    2D echo February 2020      1 - Concentric hypertrophy.     2 - No wall motion abnormalities.     3 - Normal left ventricular systolic function (EF 60-65%).     4 - Normal left ventricular diastolic function.     5 - Normal right ventricular systolic function .     6 - The estimated PA systolic pressure is 22 mmHg.     EKG February 2020      Normal sinus rhythm   Normal ECG     ASSESSMENT/PLAN:     Bilateral lower extremity edema  -     Ambulatory referral/consult to Cardiology; Future; Expected date: 10/30/2020    Chronic obstructive pulmonary disease, unspecified COPD type  Comments:  Pulmonology consult  Orders:  -     Ambulatory referral/consult to Pulmonology  -     COVID-19 Routine Screening; Future  -     Spirometry without Bronchodilator; Future; Expected date: 04/23/2021    Suspected sleep apnea  -     Home Sleep Studies; Future    Snoring  -     Home Sleep Studies; Future    Class 2 obesity due to excess calories with body mass index (BMI) of 37.0 to 37.9 in adult, unspecified whether serious comorbidity present  -     Ambulatory referral/consult to Weight Management Program; Future; Expected date: 10/30/2020      Check spirometry.    Check home sleep study.    General weight loss/lifestyle modification strategies discussed (elicit support from others; identify saboteurs; non-food rewards).  Diet interventions: low calorie (1000 kCal/d) deficit diet    Weight management referral.    Up-to-date on P PSV 23 and influenza vaccination.  Further recommendation to follow above workup    Follow up in about 2 months (around 12/23/2020).    This note  was prepared using voice recognition system and is likely to have sound alike errors that may have been overlooked even after proof reading.  Please call me with any questions    Discussed diagnosis, its evaluation, treatment and usual course. All questions answered.    Thank you for the courtesy of participating in the care of this patient    James Arevalo MD

## 2020-10-24 ENCOUNTER — PATIENT MESSAGE (OUTPATIENT)
Dept: PAIN MEDICINE | Facility: HOSPITAL | Age: 60
End: 2020-10-24

## 2020-10-26 ENCOUNTER — OFFICE VISIT (OUTPATIENT)
Dept: SURGERY | Facility: CLINIC | Age: 60
End: 2020-10-26
Payer: COMMERCIAL

## 2020-10-26 VITALS
SYSTOLIC BLOOD PRESSURE: 132 MMHG | BODY MASS INDEX: 36.98 KG/M2 | HEART RATE: 74 BPM | WEIGHT: 222.25 LBS | DIASTOLIC BLOOD PRESSURE: 80 MMHG

## 2020-10-26 DIAGNOSIS — K64.2 GRADE III HEMORRHOIDS: ICD-10-CM

## 2020-10-26 DIAGNOSIS — K64.8 INTERNAL HEMORRHOID: Primary | ICD-10-CM

## 2020-10-26 DIAGNOSIS — K64.4 EXTERNAL HEMORRHOID: ICD-10-CM

## 2020-10-26 PROCEDURE — 99999 PR PBB SHADOW E&M-EST. PATIENT-LVL III: ICD-10-PCS | Mod: PBBFAC,,, | Performed by: COLON & RECTAL SURGERY

## 2020-10-26 PROCEDURE — 46221 PR HEMORRHOIDECTOMY INTERNAL RUBBER BAND LIGATIONS: ICD-10-PCS | Mod: S$GLB,,, | Performed by: COLON & RECTAL SURGERY

## 2020-10-26 PROCEDURE — 99204 PR OFFICE/OUTPT VISIT, NEW, LEVL IV, 45-59 MIN: ICD-10-PCS | Mod: 25,S$GLB,, | Performed by: COLON & RECTAL SURGERY

## 2020-10-26 PROCEDURE — 99204 OFFICE O/P NEW MOD 45 MIN: CPT | Mod: 25,S$GLB,, | Performed by: COLON & RECTAL SURGERY

## 2020-10-26 PROCEDURE — 46221 LIGATION OF HEMORRHOID(S): CPT | Mod: S$GLB,,, | Performed by: COLON & RECTAL SURGERY

## 2020-10-26 PROCEDURE — 99999 PR PBB SHADOW E&M-EST. PATIENT-LVL III: CPT | Mod: PBBFAC,,, | Performed by: COLON & RECTAL SURGERY

## 2020-10-26 NOTE — PROGRESS NOTES
History & Physical    SUBJECTIVE:     CC: hemorrhoids  Ref MD: Pauline Ricketts MD    History of Present Illness:  Patient is a 60 y.o. female presents for evaluation of hemorrhoidal disease.  Patient states she has suffered from hemorrhoid disease for around 40 years now since the birth of her children.  She states that over the past 2 years she has had worsening symptoms with increasing amounts of bleeding after bowel movements as well as intermittent pain.  She states that every 2 out of every 3 bowel movements she has bleeding now.  She sees of blood both in stool and when wiping.  She states the pain occurs intermittently after bowel movements but is less often the bleeding.  Her main complaint is the bleeding.  She is not on any anticoagulation.  She states she has a bowel movement twice per day, the stools intermittently hard and she intermittently has to strain.  She drinks less than 64 oz of water per day, spends 10 min on the toilet per bowel movement, does not take any stool softeners and fiber supplementation and uses both toilet paper and wet wipes after bowel movements.  Her last colonoscopy was in September 2020 where internal hemorrhoids were seen.  She has no family history of colorectal cancer or IBD.  Denies any fever, chills, nausea, vomiting.      Review of patient's allergies indicates:  No Known Allergies    Current Outpatient Medications   Medication Sig Dispense Refill    DULoxetine (CYMBALTA) 30 MG capsule Take 1 capsule (30 mg total) by mouth once daily. 30 capsule 11    furosemide (LASIX) 20 MG tablet Take 1 tablet (20 mg total) by mouth once daily. 30 tablet 2    hydroxyzine HCL (ATARAX) 25 MG tablet Take 1 tablet (25 mg total) by mouth nightly as needed for Itching. 30 tablet 1    mupirocin (BACTROBAN) 2 % ointment Apply topically 3 (three) times daily. 30 g 2    sulfamethoxazole-trimethoprim 800-160mg (BACTRIM DS) 800-160 mg Tab Take 1 tablet by mouth 2 (two) times daily. 28 tablet  0    sulfamethoxazole-trimethoprim 800-160mg (BACTRIM DS) 800-160 mg Tab Take 1 tablet by mouth 2 (two) times daily. 14 tablet 0    traMADoL (ULTRAM) 50 mg tablet Take 1 tablet (50 mg total) by mouth every 8 (eight) hours as needed. Greater than 7 day supply medically necessary. 90 tablet 1    triamcinolone acetonide 0.1% (KENALOG) 0.1 % cream Apply topically 2 (two) times daily as needed. For itch. 454 g 1    potassium 99 mg Tab Take 1 tablet by mouth once daily.      sulfacetamide sodium 10% (BLEPH-10) 10 % ophthalmic solution Place 1 drop into the right eye every 2 (two) hours. (Patient not taking: Reported on 10/26/2020) 5 mL 0     No current facility-administered medications for this visit.        Past Medical History:   Diagnosis Date    Arthritis     GERD (gastroesophageal reflux disease)      Past Surgical History:   Procedure Laterality Date    ABDOMINAL SURGERY       SECTION      COLONOSCOPY N/A 2020    Procedure: COLONOSCOPY;  Surgeon: Pauline Ricketts MD;  Location[facility];  Service: Endoscopy;  Laterality: N/A;    HIP SURGERY      HYSTERECTOMY      INJECTION OF ANESTHETIC AGENT AROUND MEDIAL BRANCH NERVES INNERVATING LUMBAR FACET JOINT Bilateral 2020    Procedure: Bilateral L3-5 MBB;  Surgeon: Adarsh Barreto MD;  Location: [facility];  Service: Pain Management;  Laterality: Bilateral;     History reviewed. No pertinent family history.  Social History     Tobacco Use    Smoking status: Current Every Day Smoker     Packs/day: 0.25     Types: Cigarettes    Smokeless tobacco: Never Used   Substance Use Topics    Alcohol use: Yes     Frequency: 2-3 times a week     Drinks per session: 1 or 2     Binge frequency: Never     Comment: occasional     Drug use: Never        Review of Systems:  Review of Systems   Constitutional: Negative for activity change, appetite change, chills, fatigue, fever and unexpected weight change.   HENT: Negative for congestion, ear pain,  sore throat and trouble swallowing.    Eyes: Negative for pain, redness and itching.   Respiratory: Negative for cough, shortness of breath and wheezing.    Cardiovascular: Negative for chest pain, palpitations and leg swelling.   Gastrointestinal: Positive for anal bleeding, blood in stool and rectal pain. Negative for abdominal distention, abdominal pain, constipation, diarrhea, nausea and vomiting.   Endocrine: Negative for cold intolerance, heat intolerance and polyuria.   Genitourinary: Negative for dysuria, flank pain, frequency and hematuria.   Musculoskeletal: Negative for gait problem, joint swelling and neck pain.   Skin: Negative for color change, rash and wound.   Allergic/Immunologic: Negative for environmental allergies and immunocompromised state.   Neurological: Negative for dizziness, speech difficulty, weakness and numbness.   Psychiatric/Behavioral: Negative for agitation, confusion and hallucinations.       OBJECTIVE:     Vital Signs (Most Recent)  Pulse: 74 (10/26/20 1309)  BP: 132/80 (10/26/20 1309)     100.8 kg (222 lb 3.6 oz)     Physical Exam:  Physical Exam  Constitutional:       Appearance: She is well-developed.   HENT:      Head: Normocephalic and atraumatic.   Eyes:      Conjunctiva/sclera: Conjunctivae normal.   Neck:      Musculoskeletal: Normal range of motion.      Thyroid: No thyromegaly.   Cardiovascular:      Rate and Rhythm: Normal rate and regular rhythm.   Pulmonary:      Effort: Pulmonary effort is normal. No respiratory distress.   Abdominal:      General: There is no distension.      Palpations: Abdomen is soft. There is no mass.      Tenderness: There is no abdominal tenderness.   Genitourinary:     Comments: Anorectal: +external hemorrhoids in the left lateral, right post and right ant positions which are soft, nontender, nonthrombosed; FABIAN with good tone, no blood, no palpable masses or defects  Musculoskeletal: Normal range of motion.         General: No tenderness.    Skin:     General: Skin is warm and dry.      Capillary Refill: Capillary refill takes less than 2 seconds.      Findings: No rash.   Neurological:      Mental Status: She is alert and oriented to person, place, and time.       Internal hemorrhoid rubber band ligation/Anoscopy Procedure Note    Pre-procedure diagnosis: Hemorrhoids    Post-procedure diagnosis: Internal and external hemorrhoids    Procedure: Internal hemorrhoid rubber band ligation/Anoscopy    Surgeon: Rm John MD    Assistant: Nany Bellamy LPN    Specimen: none    Findings:  Anoscope inserted all 4 quadrants examined.  Moderately enlarged left lateral and right anterior greater than right posterior internal hemorrhoidal columns with evidence of recent bleeding and prolapse.  No other large intrarectal lesions or masses appreciated.    With informed consent 2 rubber bands were applied at right anterior and left lateral positions.  The procedure was tolerated well.  The patient was given a handout which discussed their disease process, precautions, and instructions for follow-up and therapy.    Laboratory  Lab Results   Component Value Date    WBC 4.89 02/03/2020    HGB 14.0 02/03/2020    HCT 42.1 02/03/2020     02/03/2020    CHOL 206 (H) 02/04/2020    TRIG 214 (H) 02/04/2020    HDL 46 02/04/2020    ALT 28 02/03/2020    AST 23 02/03/2020     06/09/2020    K 4.6 06/09/2020     06/09/2020    CREATININE 0.9 06/09/2020    BUN 12 06/09/2020    CO2 24 06/09/2020    INR 1.0 10/15/2009       Diagnostic Results:  Colonoscopy: Reviewed      ASSESSMENT/PLAN:     61yo F with internal and external hemorrhoids    - long discussion with the patient regarding the nature and treatment of hemorrhoidal disease.  Discussed that she should initiate behavioral, lifestyle medication modifications to improve her bowel habits.  Discussed possible banding and surgical intervention as well.  After discussion of all routes of intervention, the patient  elected to undergo banding in the office today.  - successful to column banding performed in the office today as the patient's main complaint was bleeding after bowel movements.  Usual post banding handout given to the patient.  - Discussed that a minimum I would recommend behavioral, lifestyle and medication modifications to improve bowel habits. Usual bowel management handout given to patient. This includes a stool softener twice per day, fiber powder supplementation daily, drinking at least 64oz of water/day, avoiding straining with bowel movements, spending less than 5 min on toilet per bowel movement, eating a high fiber diet, using miralax as needed to achieve a bowel movement daily and using wet wipes to wipe after bowel movements when irritated.   - RTC 4-6 weeks for reevaluation or PRN    Over 25 minutes were spent in face to face contact with the patient with greater than 50% spent discussing their diagnosis and coordination of care.     Rm John MD  Colon and Rectal Surgery  [facility]

## 2020-10-26 NOTE — LETTER
October 26, 2020      Pauline Ricketts MD  [address]  [place] [address]            [facility]  [id number] [place] [address] , [address]LO[address]  [place] [address]  Phone: [phone]  Fax: [phone]          Patient: Sharri House   MR Number: [id number]   YOB: 1960   Date of Visit: 10/26/2020       Dear Dr. Pauline Ricketts:    Thank you for referring Sharri House to me for evaluation. Attached you will find relevant portions of my assessment and plan of care.    If you have questions, please do not hesitate to call me. I look forward to following Sharri House along with you.    Sincerely,    Rm John MD    Enclosure  CC:  No Recipients    If you would like to receive this communication electronically, please contact [email or web address] or [phone] to request more information on [employer] Link access.    For providers and/or their staff who would like to refer a patient to [facility], please contact us through our one-stop-shop provider referral line, [facility], at [phone].    If you feel you have received this communication in error or would no longer like to receive these types of communications, please e-mail [email or web address]

## 2020-10-27 ENCOUNTER — LAB VISIT (OUTPATIENT)
Dept: URGENT CARE | Facility: CLINIC | Age: 60
End: 2020-10-27
Payer: COMMERCIAL

## 2020-10-27 DIAGNOSIS — J44.9 CHRONIC OBSTRUCTIVE PULMONARY DISEASE, UNSPECIFIED COPD TYPE: ICD-10-CM

## 2020-10-27 PROCEDURE — U0003 INFECTIOUS AGENT DETECTION BY NUCLEIC ACID (DNA OR RNA); SEVERE ACUTE RESPIRATORY SYNDROME CORONAVIRUS 2 (SARS-COV-2) (CORONAVIRUS DISEASE [COVID-19]), AMPLIFIED PROBE TECHNIQUE, MAKING USE OF HIGH THROUGHPUT TECHNOLOGIES AS DESCRIBED BY CMS-2020-01-R: HCPCS

## 2020-10-28 LAB — SARS-COV-2 RNA RESP QL NAA+PROBE: NOT DETECTED

## 2020-10-29 ENCOUNTER — PATIENT MESSAGE (OUTPATIENT)
Dept: SURGERY | Facility: CLINIC | Age: 60
End: 2020-10-29

## 2020-10-29 ENCOUNTER — OFFICE VISIT (OUTPATIENT)
Dept: SURGERY | Facility: CLINIC | Age: 60
End: 2020-10-29
Payer: COMMERCIAL

## 2020-10-29 VITALS
WEIGHT: 226.63 LBS | BODY MASS INDEX: 37.71 KG/M2 | TEMPERATURE: 99 F | HEART RATE: 78 BPM | DIASTOLIC BLOOD PRESSURE: 79 MMHG | SYSTOLIC BLOOD PRESSURE: 138 MMHG

## 2020-10-29 DIAGNOSIS — K64.8 INTERNAL HEMORRHOID: ICD-10-CM

## 2020-10-29 DIAGNOSIS — K64.5 THROMBOSED EXTERNAL HEMORRHOID: Primary | ICD-10-CM

## 2020-10-29 DIAGNOSIS — K62.89 ANAL PAIN: ICD-10-CM

## 2020-10-29 DIAGNOSIS — K64.4 EXTERNAL HEMORRHOID: ICD-10-CM

## 2020-10-29 PROCEDURE — 46320 REMOVAL OF HEMORRHOID CLOT: CPT | Mod: 58,S$GLB,, | Performed by: COLON & RECTAL SURGERY

## 2020-10-29 PROCEDURE — 99214 PR OFFICE/OUTPT VISIT, EST, LEVL IV, 30-39 MIN: ICD-10-PCS | Mod: 25,S$GLB,, | Performed by: COLON & RECTAL SURGERY

## 2020-10-29 PROCEDURE — 99999 PR PBB SHADOW E&M-EST. PATIENT-LVL III: ICD-10-PCS | Mod: PBBFAC,,, | Performed by: COLON & RECTAL SURGERY

## 2020-10-29 PROCEDURE — 99999 PR PBB SHADOW E&M-EST. PATIENT-LVL III: CPT | Mod: PBBFAC,,, | Performed by: COLON & RECTAL SURGERY

## 2020-10-29 PROCEDURE — 99214 OFFICE O/P EST MOD 30 MIN: CPT | Mod: 25,S$GLB,, | Performed by: COLON & RECTAL SURGERY

## 2020-10-29 PROCEDURE — 46320 PR EXCISION THROMBOSED HEMORRHOID, EXTERNAL: ICD-10-PCS | Mod: 58,S$GLB,, | Performed by: COLON & RECTAL SURGERY

## 2020-10-30 ENCOUNTER — CLINICAL SUPPORT (OUTPATIENT)
Dept: PULMONOLOGY | Facility: CLINIC | Age: 60
End: 2020-10-30
Payer: COMMERCIAL

## 2020-10-30 DIAGNOSIS — J44.9 CHRONIC OBSTRUCTIVE PULMONARY DISEASE, UNSPECIFIED COPD TYPE: ICD-10-CM

## 2020-10-30 LAB
BRPFT: NORMAL
FEF 25 75 LLN: 1.19
FEF 25 75 PRE REF: 70.9 %
FEF 25 75 REF: 2.32
FEV1 FVC LLN: 67
FEV1 FVC PRE REF: 90.1 %
FEV1 FVC REF: 79
FEV1 LLN: 1.95
FEV1 PRE REF: 82.6 %
FEV1 REF: 2.58
FVC LLN: 2.48
FVC PRE REF: 91.1 %
FVC REF: 3.27
PEF LLN: 4.68
PEF PRE REF: 57.7 %
PEF REF: 6.44
PRE FEF 25 75: 1.64 L/S
PRE FET 100: 10.9 SEC
PRE FEV1 FVC: 71.36 %
PRE FEV1: 2.13 L
PRE FVC: 2.98 L
PRE PEF: 3.72 L/S

## 2020-10-30 PROCEDURE — 94010 BREATHING CAPACITY TEST: CPT | Mod: S$GLB,,, | Performed by: INTERNAL MEDICINE

## 2020-10-30 PROCEDURE — 94010 BREATHING CAPACITY TEST: ICD-10-PCS | Mod: S$GLB,,, | Performed by: INTERNAL MEDICINE

## 2020-11-02 NOTE — PROGRESS NOTES
History & Physical    SUBJECTIVE:     CC: hemorrhoids  Ref MD: Pauline Ricketts MD    History of Present Illness:  Patient is a 60 y.o. female presents for evaluation of hemorrhoidal disease.  Patient states she has suffered from hemorrhoid disease for around 40 years now since the birth of her children.  She states that over the past 2 years she has had worsening symptoms with increasing amounts of bleeding after bowel movements as well as intermittent pain.  She states that every 2 out of every 3 bowel movements she has bleeding now.  She sees of blood both in stool and when wiping.  She states the pain occurs intermittently after bowel movements but is less often the bleeding.  Her main complaint is the bleeding.  She is not on any anticoagulation.  She states she has a bowel movement twice per day, the stools intermittently hard and she intermittently has to strain.  She drinks less than 64 oz of water per day, spends 10 min on the toilet per bowel movement, does not take any stool softeners and fiber supplementation and uses both toilet paper and wet wipes after bowel movements.  Her last colonoscopy was in September 2020 where internal hemorrhoids were seen.  She has no family history of colorectal cancer or IBD.  Denies any fever, chills, nausea, vomiting.    Interval history:  Since last clinic visit, the patient initially did well.  She developed acute onset of perianal pain 2 days following the banding.  She states this pain was on the outside and is very severe.  She denies any fever, chills, nausea, vomiting.  She denies any hematochezia or melena.    Review of patient's allergies indicates:  No Known Allergies    Current Outpatient Medications   Medication Sig Dispense Refill    DULoxetine (CYMBALTA) 30 MG capsule Take 1 capsule (30 mg total) by mouth once daily. 30 capsule 11    furosemide (LASIX) 20 MG tablet Take 1 tablet (20 mg total) by mouth once daily. 30 tablet 2    hydroxyzine HCL (ATARAX) 25  MG tablet Take 1 tablet (25 mg total) by mouth nightly as needed for Itching. 30 tablet 1    mupirocin (BACTROBAN) 2 % ointment Apply topically 3 (three) times daily. 30 g 2    potassium 99 mg Tab Take 1 tablet by mouth once daily.      sulfamethoxazole-trimethoprim 800-160mg (BACTRIM DS) 800-160 mg Tab Take 1 tablet by mouth 2 (two) times daily. 28 tablet 0    sulfamethoxazole-trimethoprim 800-160mg (BACTRIM DS) 800-160 mg Tab Take 1 tablet by mouth 2 (two) times daily. 14 tablet 0    traMADoL (ULTRAM) 50 mg tablet Take 1 tablet (50 mg total) by mouth every 8 (eight) hours as needed. Greater than 7 day supply medically necessary. 90 tablet 1    triamcinolone acetonide 0.1% (KENALOG) 0.1 % cream Apply topically 2 (two) times daily as needed. For itch. 454 g 1    sulfacetamide sodium 10% (BLEPH-10) 10 % ophthalmic solution Place 1 drop into the right eye every 2 (two) hours. (Patient not taking: Reported on 10/29/2020) 5 mL 0     No current facility-administered medications for this visit.        Past Medical History:   Diagnosis Date    Arthritis     GERD (gastroesophageal reflux disease)      Past Surgical History:   Procedure Laterality Date    ABDOMINAL SURGERY       SECTION      COLONOSCOPY N/A 2020    Procedure: COLONOSCOPY;  Surgeon: Pauline Ricketts MD;  Location: [facility];  Service: Endoscopy;  Laterality: N/A;    HIP SURGERY      HYSTERECTOMY      INJECTION OF ANESTHETIC AGENT AROUND MEDIAL BRANCH NERVES INNERVATING LUMBAR FACET JOINT Bilateral 2020    Procedure: Bilateral L3-5 MBB;  Surgeon: Adarsh Barreto MD;  Location: [facility];  Service: Pain Management;  Laterality: Bilateral;     History reviewed. No pertinent family history.  Social History     Tobacco Use    Smoking status: Current Every Day Smoker     Packs/day: 0.25     Types: Cigarettes    Smokeless tobacco: Never Used   Substance Use Topics    Alcohol use: Yes     Frequency: 2-3 times a week      Drinks per session: 1 or 2     Binge frequency: Never     Comment: occasional     Drug use: Never        Review of Systems:  Review of Systems   Constitutional: Negative for activity change, appetite change, chills, fatigue, fever and unexpected weight change.   HENT: Negative for congestion, ear pain, sore throat and trouble swallowing.    Eyes: Negative for pain, redness and itching.   Respiratory: Negative for cough, shortness of breath and wheezing.    Cardiovascular: Negative for chest pain, palpitations and leg swelling.   Gastrointestinal: Positive for anal bleeding, blood in stool and rectal pain. Negative for abdominal distention, abdominal pain, constipation, diarrhea, nausea and vomiting.   Endocrine: Negative for cold intolerance, heat intolerance and polyuria.   Genitourinary: Negative for dysuria, flank pain, frequency and hematuria.   Musculoskeletal: Negative for gait problem, joint swelling and neck pain.   Skin: Negative for color change, rash and wound.   Allergic/Immunologic: Negative for environmental allergies and immunocompromised state.   Neurological: Negative for dizziness, speech difficulty, weakness and numbness.   Psychiatric/Behavioral: Negative for agitation, confusion and hallucinations.       OBJECTIVE:     Vital Signs (Most Recent)  Temp: 98.6 °F (37 °C) (10/29/20 1526)  Pulse: 78 (10/29/20 1526)  BP: 138/79 (10/29/20 1526)     102.8 kg (226 lb 10.1 oz)     Physical Exam:  Physical Exam  Constitutional:       Appearance: She is well-developed.   HENT:      Head: Normocephalic and atraumatic.   Eyes:      Conjunctiva/sclera: Conjunctivae normal.   Neck:      Musculoskeletal: Normal range of motion.      Thyroid: No thyromegaly.   Cardiovascular:      Rate and Rhythm: Normal rate and regular rhythm.   Pulmonary:      Effort: Pulmonary effort is normal. No respiratory distress.   Abdominal:      General: There is no distension.      Palpations: Abdomen is soft. There is no mass.       Tenderness: There is no abdominal tenderness.   Genitourinary:     Comments: Anorectal: +thrombosed right anterior external hemorrhoid which is firm and very tender; nonthrombosed and nontender external hemorrhoids in left lateral and right posterior positions  Musculoskeletal: Normal range of motion.         General: No tenderness.   Skin:     General: Skin is warm and dry.      Capillary Refill: Capillary refill takes less than 2 seconds.      Findings: No rash.   Neurological:      Mental Status: She is alert and oriented to person, place, and time.       External hemorrhoid thrombectomy procedure:  Procedure: Sharri was seen in the clinic room and placed in the prone jackknife position on the treatment table. Attention was directed to the thrombosed right anterior external hemorrhoid. The area was prepped with betadine. Approximately 8cc of 2% lidocaine with epi was injected into the michelle-anal area. Once the area was anesthetized allowing 2-3 minutes for the anesthetic to take effect, I utilized a #15 scalpel to cut through the skin into the hemorrhoid where the clot was evacuated. The wound was dressed with dry gauze. The patient tolerated the procedure well.    Laboratory  Lab Results   Component Value Date    WBC 4.89 02/03/2020    HGB 14.0 02/03/2020    HCT 42.1 02/03/2020     02/03/2020    CHOL 206 (H) 02/04/2020    TRIG 214 (H) 02/04/2020    HDL 46 02/04/2020    ALT 28 02/03/2020    AST 23 02/03/2020     06/09/2020    K 4.6 06/09/2020     06/09/2020    CREATININE 0.9 06/09/2020    BUN 12 06/09/2020    CO2 24 06/09/2020    INR 1.0 10/15/2009       Diagnostic Results:  Colonoscopy: Reviewed      ASSESSMENT/PLAN:     61yo F with internal and external hemorrhoids who underwent internal hemorrhoid banding on 10/26/2020 and developed a thrombosed hemorrhoid a few days later    - successful hemorrhoid thrombectomy/I&D in office today  - Discussed that a minimum I would recommend behavioral,  lifestyle and medication modifications to improve bowel habits. Usual bowel management handout given to patient. This includes a stool softener twice per day, fiber powder supplementation daily, drinking at least 64oz of water/day, avoiding straining with bowel movements, spending less than 5 min on toilet per bowel movement, eating a high fiber diet, using miralax as needed to achieve a bowel movement daily and using wet wipes to wipe after bowel movements when irritated.   - RTC 4-6 weeks for reevaluation or PRN    Over 25 minutes were spent in face to face contact with the patient with greater than 50% spent discussing their diagnosis and coordination of care.     Rm John MD  Colon and Rectal Surgery  [facility]

## 2020-11-03 DIAGNOSIS — Z90.710 H/O: HYSTERECTOMY: Primary | ICD-10-CM

## 2020-11-03 DIAGNOSIS — R07.1 CHEST PAIN ON BREATHING: ICD-10-CM

## 2020-11-04 RX ORDER — TRAMADOL HYDROCHLORIDE 50 MG/1
50 TABLET ORAL EVERY 8 HOURS PRN
Qty: 90 TABLET | Refills: 1 | Status: SHIPPED | OUTPATIENT
Start: 2020-11-04 | End: 2020-12-04

## 2020-11-09 ENCOUNTER — PATIENT MESSAGE (OUTPATIENT)
Dept: SLEEP MEDICINE | Facility: HOSPITAL | Age: 60
End: 2020-11-09

## 2020-11-09 NOTE — PRE-PROCEDURE INSTRUCTIONS
Attempted to PAT patient for procedure on 11.16 with Dr. grewal . No answer. M with return phone number. No return call at this time.

## 2020-11-13 NOTE — PRE-PROCEDURE INSTRUCTIONS
Spoke with patient regarding procedure scheduled on 11/16    Arrival time 1130    Has patient been sick with fever or on antibiotics within the last 7 days? No    Has patient received a vaccination within the last 7 days? no    Has the patient stopped all medications as directed? Vitamin D on 11.8. Hold vitamins, supplements and other NSAIDS. Denies blood thinners.    Does patient have a pacemaker and or defibrillator? no    Does the patient have a ride to and from procedure and someone reliable to remain with patient?  Brandt Craig [phone]    Is the patient diabetic? no    Does the patient have sleep apnea? Or use O2 at home? No and no     Is the patient receiving sedation? yes    Is the patient instructed to remain NPO beginning at midnight the night before their procedure? yes    Procedure location confirmed with patient? Yes    Covid- Denies signs/symptoms. Instructed to notify PAT/MD if any changes.

## 2020-11-16 ENCOUNTER — HOSPITAL ENCOUNTER (OUTPATIENT)
Facility: HOSPITAL | Age: 60
Discharge: HOME | End: 2020-11-16
Attending: PAIN MEDICINE | Admitting: PAIN MEDICINE
Payer: COMMERCIAL

## 2020-11-16 VITALS
SYSTOLIC BLOOD PRESSURE: 142 MMHG | HEART RATE: 75 BPM | TEMPERATURE: 98 F | OXYGEN SATURATION: 97 % | RESPIRATION RATE: 18 BRPM | BODY MASS INDEX: 35.93 KG/M2 | HEIGHT: 66 IN | DIASTOLIC BLOOD PRESSURE: 76 MMHG | WEIGHT: 223.56 LBS

## 2020-11-16 DIAGNOSIS — M47.816 LUMBAR SPONDYLOSIS: Primary | ICD-10-CM

## 2020-11-16 PROCEDURE — 25000003 PHARM REV CODE 250: Performed by: PAIN MEDICINE

## 2020-11-16 PROCEDURE — 63600175 PHARM REV CODE 636 W HCPCS: Performed by: PAIN MEDICINE

## 2020-11-16 PROCEDURE — 25500020 PHARM REV CODE 255: Performed by: PAIN MEDICINE

## 2020-11-16 PROCEDURE — 62323 NJX INTERLAMINAR LMBR/SAC: CPT | Performed by: PAIN MEDICINE

## 2020-11-16 PROCEDURE — 62323 NJX INTERLAMINAR LMBR/SAC: CPT | Mod: ,,, | Performed by: PAIN MEDICINE

## 2020-11-16 PROCEDURE — 62323 PR INJ LUMBAR/SACRAL, W/IMAGING GUIDANCE: ICD-10-PCS | Mod: ,,, | Performed by: PAIN MEDICINE

## 2020-11-16 RX ORDER — DEXAMETHASONE SODIUM PHOSPHATE 10 MG/ML
INJECTION INTRAMUSCULAR; INTRAVENOUS
Status: DISCONTINUED | OUTPATIENT
Start: 2020-11-16 | End: 2020-11-16 | Stop reason: HOSPADM

## 2020-11-16 RX ORDER — FENTANYL CITRATE 50 UG/ML
INJECTION, SOLUTION INTRAMUSCULAR; INTRAVENOUS
Status: DISCONTINUED | OUTPATIENT
Start: 2020-11-16 | End: 2020-11-16 | Stop reason: HOSPADM

## 2020-11-16 RX ORDER — LIDOCAINE HYDROCHLORIDE 10 MG/ML
INJECTION, SOLUTION EPIDURAL; INFILTRATION; INTRACAUDAL; PERINEURAL
Status: DISCONTINUED | OUTPATIENT
Start: 2020-11-16 | End: 2020-11-16 | Stop reason: HOSPADM

## 2020-11-16 RX ORDER — MIDAZOLAM HYDROCHLORIDE 1 MG/ML
INJECTION, SOLUTION INTRAMUSCULAR; INTRAVENOUS
Status: DISCONTINUED | OUTPATIENT
Start: 2020-11-16 | End: 2020-11-16 | Stop reason: HOSPADM

## 2020-11-16 NOTE — OP NOTE
PROCEDURE: Lumbar epidural steroid injection under fluoroscopic guidance    LEVEL: L5/S1  SIDE: Midline     PROCEDURE DATE: 11/16/2020    PREOPERATIVE DIAGNOSIS: Lumbar radiculopathy  POSTOPERATIVE DIAGNOSIS: Lumbar radiculopathy    PROVIDER: BASHIR Barreto MD  Assistant(s): None    ANESTHESIA: Local, IV Sedation    >> 1 mg of VERSED    >> 50 mcg of FENTANYL    INDICATION: The patient has low back pain and radiculopathy symptoms unresponsive to conservative treatments. Fluoroscopy was used to optimize visualization of needle placement and to maximize safety.        PROCEDURE DESCRIPTION / TECHNIQUE:   The patient was seen and identified in the preoperative area. Risks, benefits, complications, and alternatives were discussed with the patient. The patient agreed to proceed with the procedure and signed the consent. The site and side of the procedure was identified and marked. An IV was started.    The patient was taken to the procedural suite. The patient was positioned in prone orientation on procedure table and a pillow was placed under the abdomen to reduce lumbar lordosis. A time out was performed prior to any intervention. The procedure, site, side, and allergies were stated and agreed to by all present. The lumbosacral area was widely prepped with ChloraPrep. The procedural site was draped in usual sterile fashion. Vital signs were closely monitored throughout this procedure. Conscious sedation was used for this procedure to decrease patient anxiety.    Using anterior-posterior fluoroscopy, the above noted INTERLAMINAR SPACE was identified and the skin over this site of intended entry was marked and then infiltrated with 3-4 mL of PF 1% LIDOCAINE subcutaneously using a 1.5 inch 27 gauge needle. A 20-gauge Tuohy epidural needle was then inserted and advanced toward the epidural space incrementally under fluoroscopic guidance. A Loss of Resistance technique was used as the epidural needle was advanced. Once  loss of resistance was realized and after negative aspiration of blood and spinal fluid, correct needle position within the epidural space was verified with injection of 0.5 to 1 mL of contrast dye (Omnipaque 240). Appropriate epidural spread was seen on imaging. Again, after negative aspiration for blood and spinal fluid a 5 mL mixture containing 1 mL of Dexamethasone (10 mg/mL) and 2 mL of preservative free Normal Saline and 2ml of preservative free 1% lidocaine was then injected. No pain or paresthesias were noted with injection. There was low resistance during the injection. Washout of epidurogram was seen on fluoroscopy following injection of the above solution. The stylet was replaced and the Tuohy needle was withdrawn intact. The skin was cleansed, and bandages were applied.    Description of Findings: Not applicable    Prosthetic devices, grafts, tissues, or devices implanted: None    Specimen Removed: No    Estimated Blood Loss: minimal    COMPLICATIONS: None    DISPOSITION / PLANS: The patient was transferred to the recovery area in a stable condition for observation. The patient was reexamined prior to discharge. There was no evidence of acute neurologic injury following the procedure.  Patient was discharged from the recovery room after meeting discharge criteria. Home discharge instructions were given to the patient by the staff.

## 2020-11-16 NOTE — PLAN OF CARE
Patient in recovery doing well, able to tolerate drinking water, no complaints of pain at this time. Bandaid to lower back clean dry intact. Went over all discharge instructions with patient has no questions and or concerns at this time.

## 2020-11-16 NOTE — DISCHARGE INSTRUCTIONS

## 2020-11-18 ENCOUNTER — HOSPITAL ENCOUNTER (EMERGENCY)
Facility: HOSPITAL | Age: 60
Discharge: HOME | End: 2020-11-18
Attending: EMERGENCY MEDICINE
Payer: COMMERCIAL

## 2020-11-18 VITALS
HEIGHT: 66 IN | OXYGEN SATURATION: 96 % | RESPIRATION RATE: 19 BRPM | BODY MASS INDEX: 35.93 KG/M2 | SYSTOLIC BLOOD PRESSURE: 188 MMHG | DIASTOLIC BLOOD PRESSURE: 99 MMHG | WEIGHT: 223.56 LBS | TEMPERATURE: 99 F | HEART RATE: 80 BPM

## 2020-11-18 DIAGNOSIS — N20.0 KIDNEY STONE ON LEFT SIDE: Primary | ICD-10-CM

## 2020-11-18 DIAGNOSIS — R10.9 ABDOMINAL PAIN: ICD-10-CM

## 2020-11-18 LAB
ALBUMIN SERPL BCP-MCNC: 3.8 G/DL (ref 3.5–5.2)
ALP SERPL-CCNC: 72 U/L (ref 55–135)
ALT SERPL W/O P-5'-P-CCNC: 32 U/L (ref 10–44)
ANION GAP SERPL CALC-SCNC: 12 MMOL/L (ref 8–16)
AST SERPL-CCNC: 20 U/L (ref 10–40)
BASOPHILS # BLD AUTO: 0.07 K/UL (ref 0–0.2)
BASOPHILS NFR BLD: 0.8 % (ref 0–1.9)
BILIRUB SERPL-MCNC: 0.3 MG/DL (ref 0.1–1)
BILIRUB UR QL STRIP: NEGATIVE
BUN SERPL-MCNC: 15 MG/DL (ref 6–20)
CALCIUM SERPL-MCNC: 9.8 MG/DL (ref 8.7–10.5)
CHLORIDE SERPL-SCNC: 102 MMOL/L (ref 95–110)
CLARITY UR: CLEAR
CO2 SERPL-SCNC: 25 MMOL/L (ref 23–29)
COLOR UR: YELLOW
CREAT SERPL-MCNC: 0.9 MG/DL (ref 0.5–1.4)
DIFFERENTIAL METHOD: ABNORMAL
EOSINOPHIL # BLD AUTO: 0.1 K/UL (ref 0–0.5)
EOSINOPHIL NFR BLD: 0.7 % (ref 0–8)
ERYTHROCYTE [DISTWIDTH] IN BLOOD BY AUTOMATED COUNT: 12.9 % (ref 11.5–14.5)
EST. GFR  (AFRICAN AMERICAN): >60 ML/MIN/1.73 M^2
EST. GFR  (NON AFRICAN AMERICAN): >60 ML/MIN/1.73 M^2
GLUCOSE SERPL-MCNC: 121 MG/DL (ref 70–110)
GLUCOSE UR QL STRIP: NEGATIVE
HCT VFR BLD AUTO: 44.5 % (ref 37–48.5)
HCV AB SERPL QL IA: POSITIVE
HGB BLD-MCNC: 14.7 G/DL (ref 12–16)
HGB UR QL STRIP: NEGATIVE
HIV 1+2 AB+HIV1 P24 AG SERPL QL IA: NEGATIVE
IMM GRANULOCYTES # BLD AUTO: 0.05 K/UL (ref 0–0.04)
IMM GRANULOCYTES NFR BLD AUTO: 0.6 % (ref 0–0.5)
KETONES UR QL STRIP: NEGATIVE
LEUKOCYTE ESTERASE UR QL STRIP: NEGATIVE
LIPASE SERPL-CCNC: 17 U/L (ref 4–60)
LYMPHOCYTES # BLD AUTO: 2.3 K/UL (ref 1–4.8)
LYMPHOCYTES NFR BLD: 26.4 % (ref 18–48)
MCH RBC QN AUTO: 28.5 PG (ref 27–31)
MCHC RBC AUTO-ENTMCNC: 33 G/DL (ref 32–36)
MCV RBC AUTO: 86 FL (ref 82–98)
MONOCYTES # BLD AUTO: 0.7 K/UL (ref 0.3–1)
MONOCYTES NFR BLD: 7.9 % (ref 4–15)
NEUTROPHILS # BLD AUTO: 5.5 K/UL (ref 1.8–7.7)
NEUTROPHILS NFR BLD: 63.6 % (ref 38–73)
NITRITE UR QL STRIP: NEGATIVE
NRBC BLD-RTO: 0 /100 WBC
PH UR STRIP: 5 [PH] (ref 5–8)
PLATELET # BLD AUTO: 329 K/UL (ref 150–350)
PMV BLD AUTO: 10.9 FL (ref 9.2–12.9)
POTASSIUM SERPL-SCNC: 3.7 MMOL/L (ref 3.5–5.1)
PROT SERPL-MCNC: 7.4 G/DL (ref 6–8.4)
PROT UR QL STRIP: NEGATIVE
RBC # BLD AUTO: 5.15 M/UL (ref 4–5.4)
SARS-COV-2 RDRP RESP QL NAA+PROBE: NEGATIVE
SODIUM SERPL-SCNC: 139 MMOL/L (ref 136–145)
SP GR UR STRIP: 1.02 (ref 1–1.03)
URN SPEC COLLECT METH UR: NORMAL
UROBILINOGEN UR STRIP-ACNC: NEGATIVE EU/DL
WBC # BLD AUTO: 8.66 K/UL (ref 3.9–12.7)

## 2020-11-18 PROCEDURE — 99285 EMERGENCY DEPT VISIT HI MDM: CPT | Mod: 25

## 2020-11-18 PROCEDURE — 25000003 PHARM REV CODE 250: Performed by: EMERGENCY MEDICINE

## 2020-11-18 PROCEDURE — 80053 COMPREHEN METABOLIC PANEL: CPT

## 2020-11-18 PROCEDURE — 86703 HIV-1/HIV-2 1 RESULT ANTBDY: CPT

## 2020-11-18 PROCEDURE — 83690 ASSAY OF LIPASE: CPT

## 2020-11-18 PROCEDURE — 85025 COMPLETE CBC W/AUTO DIFF WBC: CPT

## 2020-11-18 PROCEDURE — P9612 CATHETERIZE FOR URINE SPEC: HCPCS

## 2020-11-18 PROCEDURE — 63600175 PHARM REV CODE 636 W HCPCS: Performed by: EMERGENCY MEDICINE

## 2020-11-18 PROCEDURE — 96374 THER/PROPH/DIAG INJ IV PUSH: CPT

## 2020-11-18 PROCEDURE — 86803 HEPATITIS C AB TEST: CPT

## 2020-11-18 PROCEDURE — 81003 URINALYSIS AUTO W/O SCOPE: CPT

## 2020-11-18 PROCEDURE — U0002 COVID-19 LAB TEST NON-CDC: HCPCS

## 2020-11-18 PROCEDURE — 25500020 PHARM REV CODE 255: Performed by: EMERGENCY MEDICINE

## 2020-11-18 PROCEDURE — 96375 TX/PRO/DX INJ NEW DRUG ADDON: CPT

## 2020-11-18 PROCEDURE — 96361 HYDRATE IV INFUSION ADD-ON: CPT

## 2020-11-18 RX ORDER — OXYCODONE AND ACETAMINOPHEN 5; 325 MG/1; MG/1
1 TABLET ORAL EVERY 4 HOURS PRN
Qty: 18 TABLET | Refills: 0 | Status: SHIPPED | OUTPATIENT
Start: 2020-11-18 | End: 2024-01-29

## 2020-11-18 RX ORDER — NAPROXEN 500 MG/1
500 TABLET ORAL 2 TIMES DAILY WITH MEALS
Qty: 60 TABLET | Refills: 0 | Status: SHIPPED | OUTPATIENT
Start: 2020-11-18 | End: 2020-11-18 | Stop reason: SDUPTHER

## 2020-11-18 RX ORDER — TAMSULOSIN HYDROCHLORIDE 0.4 MG/1
0.4 CAPSULE ORAL DAILY
Qty: 30 CAPSULE | Refills: 0 | Status: SHIPPED | OUTPATIENT
Start: 2020-11-18 | End: 2020-11-18 | Stop reason: SDUPTHER

## 2020-11-18 RX ORDER — ONDANSETRON 2 MG/ML
4 INJECTION INTRAMUSCULAR; INTRAVENOUS
Status: COMPLETED | OUTPATIENT
Start: 2020-11-18 | End: 2020-11-18

## 2020-11-18 RX ORDER — TAMSULOSIN HYDROCHLORIDE 0.4 MG/1
0.4 CAPSULE ORAL DAILY
Qty: 30 CAPSULE | Refills: 0 | Status: SHIPPED | OUTPATIENT
Start: 2020-11-18 | End: 2024-01-29

## 2020-11-18 RX ORDER — HYDROMORPHONE HYDROCHLORIDE 2 MG/ML
1 INJECTION, SOLUTION INTRAMUSCULAR; INTRAVENOUS; SUBCUTANEOUS
Status: COMPLETED | OUTPATIENT
Start: 2020-11-18 | End: 2020-11-18

## 2020-11-18 RX ORDER — KETOROLAC TROMETHAMINE 30 MG/ML
10 INJECTION, SOLUTION INTRAMUSCULAR; INTRAVENOUS
Status: COMPLETED | OUTPATIENT
Start: 2020-11-18 | End: 2020-11-18

## 2020-11-18 RX ORDER — NAPROXEN 500 MG/1
500 TABLET ORAL 2 TIMES DAILY WITH MEALS
Qty: 60 TABLET | Refills: 0 | Status: SHIPPED | OUTPATIENT
Start: 2020-11-18 | End: 2024-01-29

## 2020-11-18 RX ORDER — OXYCODONE AND ACETAMINOPHEN 5; 325 MG/1; MG/1
1 TABLET ORAL EVERY 4 HOURS PRN
Qty: 18 TABLET | Refills: 0 | Status: SHIPPED | OUTPATIENT
Start: 2020-11-18 | End: 2020-11-18 | Stop reason: SDUPTHER

## 2020-11-18 RX ORDER — MORPHINE SULFATE 4 MG/ML
4 INJECTION, SOLUTION INTRAMUSCULAR; INTRAVENOUS
Status: COMPLETED | OUTPATIENT
Start: 2020-11-18 | End: 2020-11-18

## 2020-11-18 RX ADMIN — ONDANSETRON 4 MG: 2 INJECTION INTRAMUSCULAR; INTRAVENOUS at 07:11

## 2020-11-18 RX ADMIN — SODIUM CHLORIDE 1000 ML: 0.9 INJECTION, SOLUTION INTRAVENOUS at 07:11

## 2020-11-18 RX ADMIN — HYDROMORPHONE HYDROCHLORIDE 1 MG: 2 INJECTION INTRAMUSCULAR; INTRAVENOUS; SUBCUTANEOUS at 07:11

## 2020-11-18 RX ADMIN — MORPHINE SULFATE 4 MG: 4 INJECTION, SOLUTION INTRAMUSCULAR; INTRAVENOUS at 09:11

## 2020-11-18 RX ADMIN — KETOROLAC TROMETHAMINE 10 MG: 30 INJECTION, SOLUTION INTRAMUSCULAR at 10:11

## 2020-11-18 RX ADMIN — IOHEXOL 100 ML: 350 INJECTION, SOLUTION INTRAVENOUS at 09:11

## 2020-11-18 NOTE — ED PROVIDER NOTES
SCRIBE #1 NOTE: I, Tate Bell, am scribing for, and in the presence of, Alexander Knox MD. I have scribed the entire note.       History     Chief Complaint   Patient presents with    Abdominal Pain     LLQ, patient reports since 4am. reports hemorroid banding 2 weeks ago and had epidural on lower back done 2 days ago. patient reports feels like she has to have a BM     Review of patient's allergies indicates:  No Known Allergies      History of Present Illness     HPI    2020, 6:44 AM  History obtained from the adult caretaker and patient      History of Present Illness: Sharri House is a 60 y.o. female patient with a PMHx of GERD, arthritis who presents to the Emergency Department for evaluation of LLQ abdominal pain which onset at 4 AM this morning. Symptoms are constant and severe. No mitigating or exacerbating factors reported. No associated sxs reported. Patient denies any CP, SOB, fever, chills, N/V, and all other sxs at this time. No prior Tx reported. No further complaints or concerns at this time.       Arrival mode: Personal vehicle    PCP: Ar Brandt MD        Past Medical History:  Past Medical History:   Diagnosis Date    Arthritis     GERD (gastroesophageal reflux disease)     Hepatitis C antibody positive in blood     RNA NEGATIVE, TREATED       Past Surgical History:  Past Surgical History:   Procedure Laterality Date    ABDOMINAL SURGERY       SECTION      COLONOSCOPY N/A 2020    Procedure: COLONOSCOPY;  Surgeon: Pauline Ricketts MD;  Location: [facility] ENDO;  Service: Endoscopy;  Laterality: N/A;    EPIDURAL STEROID INJECTION N/A 2020    Procedure: Lumbar L5/S1 IL MELANIE;  Surgeon: Adarsh Barreto MD;  Location: [facility] PAIN MGT;  Service: Pain Management;  Laterality: N/A;    HIP SURGERY      HYSTERECTOMY      INJECTION OF ANESTHETIC AGENT AROUND MEDIAL BRANCH NERVES INNERVATING LUMBAR FACET JOINT Bilateral 2020    Procedure: Bilateral  L3-5 MBB;  Surgeon: Adarsh Barreto MD;  Location: [facility];  Service: Pain Management;  Laterality: Bilateral;         Family History:  History reviewed. No pertinent family history.       Social History:  Social History     Tobacco Use    Smoking status: Current Every Day Smoker     Packs/day: 0.25     Types: Cigarettes    Smokeless tobacco: Never Used   Substance and Sexual Activity    Alcohol use: Yes     Frequency: 2-3 times a week     Drinks per session: 1 or 2     Binge frequency: Never     Comment: occasional     Drug use: Never    Sexual activity: Yes     Partners: Male     Birth control/protection: None        Review of Systems     Review of Systems   Constitutional: Negative for activity change, appetite change, chills, diaphoresis and fever.   HENT: Negative for congestion, drooling, ear pain, mouth sores, rhinorrhea, sinus pain, sore throat and trouble swallowing.    Eyes: Negative for pain and discharge.   Respiratory: Negative for cough, chest tightness, shortness of breath, wheezing and stridor.    Cardiovascular: Negative for chest pain, palpitations and leg swelling.   Gastrointestinal: Positive for abdominal pain (LLQ). Negative for abdominal distention, blood in stool, constipation, diarrhea, nausea and vomiting.   Genitourinary: Negative for difficulty urinating, dysuria, flank pain, frequency, hematuria and urgency.   Musculoskeletal: Negative for arthralgias, back pain and myalgias.   Skin: Negative for pallor, rash and wound.   Neurological: Negative for dizziness, syncope, weakness, light-headedness and numbness.   All other systems reviewed and are negative.       Physical Exam     Initial Vitals [11/18/20 0633]   BP Pulse Resp Temp SpO2   (!) 144/98 85 20 98.6 °F (37 °C) 97 %      MAP       --          Physical Exam  Nursing Notes and Vital Signs Reviewed.  Constitutional: Patient is in severe distress.  Head: Atraumatic. Normocephalic.  Eyes: PERRL. EOM intact. Conjunctivae are  "not pale. No scleral icterus.  ENT: Mucous membranes are moist. Oropharynx is clear and symmetric.    Neck: Supple. Full ROM. No lymphadenopathy.  Cardiovascular: Regular rate. Regular rhythm. No murmurs, rubs, or gallops. Distal pulses are 2+ and symmetric.  Pulmonary/Chest: No respiratory distress. Clear to auscultation bilaterally. No wheezing or rales.  Abdominal: Soft and non-distended.  There is tenderness to LLQ.  No rebound, guarding, or rigidity. Good bowel sounds.  Genitourinary: No CVA tenderness  Musculoskeletal: Moves all extremities. No obvious deformities. No edema. No calf tenderness.  Skin: Warm and dry.  Neurological:  Alert, awake, and appropriate.  Normal speech.  No acute focal neurological deficits are appreciated.  Psychiatric: Normal affect. Good eye contact. Appropriate in content.     ED Course   Procedures  ED Vital Signs:  Vitals:    11/18/20 0633 11/18/20 0708 11/18/20 0711 11/18/20 0712   BP: (!) 144/98 (!) 185/107     Pulse: 85 77 76    Resp: 20   18   Temp: 98.6 °F (37 °C)      TempSrc: Oral      SpO2: 97% 95%     Weight: 101.4 kg (223 lb 8.7 oz)      Height: 5' 6" (1.676 m)       11/18/20 0901 11/18/20 0935 11/18/20 1000   BP: (!) 200/99  (!) 188/99   Pulse: 77  80   Resp: 16 17 19   Temp:      TempSrc:      SpO2: 96%  96%   Weight:      Height:          Abnormal Lab Results:  Labs Reviewed   HEPATITIS C ANTIBODY - Abnormal; Notable for the following components:       Result Value    Hepatitis C Ab Positive (*)     All other components within normal limits   CBC W/ AUTO DIFFERENTIAL - Abnormal; Notable for the following components:    Immature Granulocytes 0.6 (*)     Immature Grans (Abs) 0.05 (*)     All other components within normal limits   COMPREHENSIVE METABOLIC PANEL - Abnormal; Notable for the following components:    Glucose 121 (*)     All other components within normal limits   HIV 1 / 2 ANTIBODY   LIPASE   URINALYSIS, REFLEX TO URINE CULTURE    Narrative:     Specimen " Source->Urine   SARS-COV-2 RNA AMPLIFICATION, QUAL        All Lab Results:  Results for orders placed or performed during the hospital encounter of 11/18/20   HIV 1/2 Ag/Ab (4th Gen)   Result Value Ref Range    HIV 1/2 Ag/Ab Negative Negative   Hepatitis C antibody   Result Value Ref Range    Hepatitis C Ab Positive (A) Negative   CBC W/ AUTO DIFFERENTIAL   Result Value Ref Range    WBC 8.66 3.90 - 12.70 K/uL    RBC 5.15 4.00 - 5.40 M/uL    Hemoglobin 14.7 12.0 - 16.0 g/dL    Hematocrit 44.5 37.0 - 48.5 %    MCV 86 82 - 98 fL    MCH 28.5 27.0 - 31.0 pg    MCHC 33.0 32.0 - 36.0 g/dL    RDW 12.9 11.5 - 14.5 %    Platelets 329 150 - 350 K/uL    MPV 10.9 9.2 - 12.9 fL    Immature Granulocytes 0.6 (H) 0.0 - 0.5 %    Gran # (ANC) 5.5 1.8 - 7.7 K/uL    Immature Grans (Abs) 0.05 (H) 0.00 - 0.04 K/uL    Lymph # 2.3 1.0 - 4.8 K/uL    Mono # 0.7 0.3 - 1.0 K/uL    Eos # 0.1 0.0 - 0.5 K/uL    Baso # 0.07 0.00 - 0.20 K/uL    nRBC 0 0 /100 WBC    Gran % 63.6 38.0 - 73.0 %    Lymph % 26.4 18.0 - 48.0 %    Mono % 7.9 4.0 - 15.0 %    Eosinophil % 0.7 0.0 - 8.0 %    Basophil % 0.8 0.0 - 1.9 %    Differential Method Automated    Comp. Metabolic Panel   Result Value Ref Range    Sodium 139 136 - 145 mmol/L    Potassium 3.7 3.5 - 5.1 mmol/L    Chloride 102 95 - 110 mmol/L    CO2 25 23 - 29 mmol/L    Glucose 121 (H) 70 - 110 mg/dL    BUN 15 6 - 20 mg/dL    Creatinine 0.9 0.5 - 1.4 mg/dL    Calcium 9.8 8.7 - 10.5 mg/dL    Total Protein 7.4 6.0 - 8.4 g/dL    Albumin 3.8 3.5 - 5.2 g/dL    Total Bilirubin 0.3 0.1 - 1.0 mg/dL    Alkaline Phosphatase 72 55 - 135 U/L    AST 20 10 - 40 U/L    ALT 32 10 - 44 U/L    Anion Gap 12 8 - 16 mmol/L    eGFR if African American >60 >60 mL/min/1.73 m^2    eGFR if non African American >60 >60 mL/min/1.73 m^2   Lipase   Result Value Ref Range    Lipase 17 4 - 60 U/L   Urinalysis, Reflex to Urine Culture Urine, Clean Catch    Specimen: Urine   Result Value Ref Range    Specimen UA Urine, Catheterized      Color, UA Yellow Yellow, Straw, Ebony    Appearance, UA Clear Clear    pH, UA 5.0 5.0 - 8.0    Specific Gravity, UA 1.025 1.005 - 1.030    Protein, UA Negative Negative    Glucose, UA Negative Negative    Ketones, UA Negative Negative    Bilirubin (UA) Negative Negative    Occult Blood UA Negative Negative    Nitrite, UA Negative Negative    Urobilinogen, UA Negative <2.0 EU/dL    Leukocytes, UA Negative Negative   COVID-19 Rapid Screening   Result Value Ref Range    SARS-CoV-2 RNA, Amplification, Qual Negative Negative         Imaging Results:  Imaging Results          CT Abdomen Pelvis With Contrast (Final result)  Result time 11/18/20 09:34:47    Final result by Elroy Rodriguez Jr., MD (11/18/20 09:34:47)                 Impression:      1. 3 mm obstructing stone within the left UVJ with moderate left obstructive uropathy.  No additional kidney stones.  2. Fatty infiltration of the liver.  3. Three tiny fat containing ventral hernias with neck is measuring between 8 and 12 mm.  No herniated loops of bowel.  4. Nonspecific 5 mm nodule within the periphery of the right lower lobe.  Parenchymal bands within the posterior lower lobes likely relate to atelectasis.  Consider 12 month follow-up CT of the chest to re-evaluate if the patient is high risk for pulmonary malignancy.      Electronically signed by: Elroy Rodriguez Jr., MD  Date:    11/18/2020  Time:    09:34             Narrative:    EXAMINATION:  CT ABDOMEN PELVIS WITH CONTRAST    CLINICAL HISTORY:  LLQ abdominal pain, diverticulitis suspected;    TECHNIQUE:  Low dose axial images, sagittal and coronal reformations were obtained from the lung bases to the pubic symphysis following the IV administration of 100 mL of Omnipaque 350 .  Oral contrast was not administered. All CT scans at this facility use dose modulation, iterative reconstruction and/or weight based dosing when appropriate to reduce radiation dose to as low as reasonably  achievable.    COMPARISON:  None.    FINDINGS:  Abdomen:    - Lower thorax: Normal heart size.    - Lung bases: Parenchymal bands involve the posterior lower lobes bilaterally.  Nonspecific 5 mm nodule within the periphery of the right lower lobe on series 2, image 9.    - Liver: Fatty infiltration of the liver with sparing about the gallbladder fossa and within the inferior right lower lobe.    - Gallbladder: No calcified gallstones.    - Bile Ducts: No abnormal bile duct dilation.    - Spleen: Unremarkable.    - Kidneys: Moderate strandy inflammatory change about the left kidney with mild left hydroureteronephrosis.  3 mm stone within the left ureterovesical junction.  No additional kidney stones.  No right-sided hydronephrosis.  The right kidney enhances homogeneously.  The urinary bladder is decompressed.    - Adrenals: Unremarkable.    - Pancreas: Mild fatty atrophy.    - Bowel/Mesentery: No evidence of bowel obstruction or acute inflammation of the alimentary tract.    - Peritoneal cavity: No free air or free fluid.    - Retroperitoneum:  No lymphadenopathy.    - Vascular: No abdominal aortic aneurysm.    - Abdominal wall:  There are 3 tiny fat containing ventral hernias with neck is measuring 8 mm, 12 mm, and 9 mm.  No herniated loops of bowel.    - Reproductive organs: Prior hysterectomy.    Bones:  Chronic fracture of the inferior right pubic ramus.  Old fracture of the superior right pubic ramus.  Old left femoral fracture.                               X-Ray Abdomen Flat And Erect (Final result)  Result time 11/18/20 08:12:57    Final result by Elroy Rodriguez Jr., MD (11/18/20 08:12:57)                 Impression:      Relatively gasless bowel loops.  No definite acute findings.      Electronically signed by: Elroy Rodriguez Jr., MD  Date:    11/18/2020  Time:    08:12             Narrative:    EXAMINATION:  XR ABDOMEN FLAT AND ERECT    CLINICAL HISTORY:  Unspecified abdominal pain    COMPARISON:  Prior  radiographs from November 6, 2009.    FINDINGS:  Relatively gasless bowel loops.  No gross free air.  No abnormal soft tissue calcifications. Regional bones are unremarkable.                               X-Ray Chest 1 View (Final result)  Result time 11/18/20 07:53:36    Final result by Eddie Morales MD (11/18/20 07:53:36)                 Impression:      No acute process seen.      Electronically signed by: Eddie Morales MD  Date:    11/18/2020  Time:    07:53             Narrative:    EXAMINATION:  XR CHEST 1 VIEW    CLINICAL HISTORY:  Unspecified abdominal pain    FINDINGS:  Single view of the chest.  Comparison 02/03/2020    Cardiac silhouette is borderline enlarged.  The lungs demonstrate no evidence of active disease.  Mild emphysematous changes are suspected the lung apices.  No evidence of pleural effusion or pneumothorax.  Bones demonstrate mild degenerative changes.                                          The Emergency Provider reviewed the vital signs and test results, which are outlined above.     ED Discussion       10:36 AM: Reassessed pt at this time.  Pt states her condition has improved at this time. Discussed with pt all pertinent ED information and results. Discussed pt dx and plan of tx. Gave pt all f/u and return to the ED instructions. All questions and concerns were addressed at this time. Pt expresses understanding of information and instructions, and is comfortable with plan to discharge. Pt is stable for discharge.    I discussed with patient and/or family/caretaker that evaluation in the ED does not suggest any emergent or life threatening medical conditions requiring immediate intervention beyond what was provided in the ED, and I believe patient is safe for discharge.  Regardless, an unremarkable evaluation in the ED does not preclude the development or presence of a serious of life threatening condition. As such, patient was instructed to return immediately for any worsening or  change in current symptoms.         Medical Decision Making:   Clinical Tests:   Lab Tests: Ordered and Reviewed  Radiological Study: Ordered and Reviewed           ED Medication(s):  Medications   sodium chloride 0.9% bolus 1,000 mL (0 mLs Intravenous Stopped 11/18/20 1005)   hydromorphone (PF) injection 1 mg (1 mg Intravenous Given 11/18/20 0712)   ondansetron injection 4 mg (4 mg Intravenous Given 11/18/20 0712)   morphine injection 4 mg (4 mg Intravenous Given 11/18/20 0935)   iohexoL (OMNIPAQUE 350) injection 100 mL (100 mLs Intravenous Given 11/18/20 0916)   ketorolac injection 9.999 mg (9.999 mg Intravenous Given 11/18/20 1034)       Current Discharge Medication List      START taking these medications    Details   naproxen (NAPROSYN) 500 MG tablet Take 1 tablet (500 mg total) by mouth 2 (two) times daily with meals.  Qty: 60 tablet, Refills: 0      oxyCODONE-acetaminophen (PERCOCET) 5-325 mg per tablet Take 1 tablet by mouth every 4 (four) hours as needed for Pain.  Qty: 18 tablet, Refills: 0    Comments: Quantity prescribed more than 7 day supply? No      tamsulosin (FLOMAX) 0.4 mg Cap Take 1 capsule (0.4 mg total) by mouth once daily.  Qty: 30 capsule, Refills: 0             Follow-up Information     Call  Ar Brandt MD.    Specialty: Family Medicine  Why: As needed  Contact information:  [address] [place] [id number] [phone]             Urology. Call in 1 day.    Why: for further evaluation and follow up of Kidney Stone           [facility] - .    Specialty: Emergency Medicine  Why: If symptoms worsen  Contact information:  [address] [facility] [address] [phone]                     Scribe Attestation:   Scribe #1: I performed the above scribed service and the documentation accurately describes the services I performed. I attest to the accuracy of the note.     Attending:   Physician Attestation Statement for Scribe #1: I,  Alexander Knox MD, personally performed the services described in this documentation, as scribed by Tate Bell, in my presence, and it is both accurate and complete.           Clinical Impression       ICD-10-CM ICD-9-CM   1. Kidney stone on left side  N20.0 592.0   2. Abdominal pain  R10.9 789.00       Disposition:   Disposition: Discharged  Condition: Stable         Alexander Knox MD  11/18/20 1038

## 2020-11-18 NOTE — Clinical Note
"Rafa Shepard accompanied Sharri House (Julie) to the emergency department on 11/18/2020. They may return to work on 11/19/2020.      If you have any questions or concerns, please don't hesitate to call.      Adelina CARPENTER"

## 2020-11-18 NOTE — Clinical Note
"Sharri Pabon" Lacy was seen and treated in our emergency department on 11/18/2020.  She may return to work on 11/23/2020.       If you have any questions or concerns, please don't hesitate to call.      Adelina CARPENTER    "

## 2020-11-23 NOTE — DISCHARGE SUMMARY
[facility]  Short Stay  Discharge Summary    Admit Date: 11/16/2020    Discharge Date and Time: 11/16/2020  2:21 PM      Discharge Attending Physician: BASHIR Barreto MD     Hospital Course (synopsis of major diagnoses, care, treatment, and services provided during the course of the hospital stay): Patient was admitted to Pre-op where informed consent was signed.  The patient was then taken to the procedure suite where the procedure was performed.  The patient was then return to the Pre-Op area and discharge was performed.     Final Diagnoses:    Principal Problem: <principal problem not specified>   Secondary Diagnoses:   Active Hospital Problems    Diagnosis  POA    Lumbar spondylosis [M47.816]  Yes      Resolved Hospital Problems   No resolved problems to display.       Discharged Condition: good    Disposition: Home or Self Care    Follow up/Patient Instructions:    Medications:  Reconciled Home Medications:      Medication List      CONTINUE taking these medications    DULoxetine 30 MG capsule  Commonly known as: CYMBALTA  Take 1 capsule (30 mg total) by mouth once daily.     furosemide 20 MG tablet  Commonly known as: LASIX  Take 1 tablet (20 mg total) by mouth once daily.     hydrOXYzine HCL 25 MG tablet  Commonly known as: ATARAX  Take 1 tablet (25 mg total) by mouth nightly as needed for Itching.     mupirocin 2 % ointment  Commonly known as: BACTROBAN  Apply topically 3 (three) times daily.     potassium 99 mg Tab  Take 1 tablet by mouth once daily.     sulfacetamide sodium 10% 10 % ophthalmic solution  Commonly known as: BLEPH-10  Place 1 drop into the right eye every 2 (two) hours.     * sulfamethoxazole-trimethoprim 800-160mg 800-160 mg Tab  Commonly known as: BACTRIM DS  Take 1 tablet by mouth 2 (two) times daily.     * sulfamethoxazole-trimethoprim 800-160mg 800-160 mg Tab  Commonly known as: BACTRIM DS  Take 1 tablet by mouth 2 (two) times daily.     traMADoL 50 mg tablet  Commonly  known as: ULTRAM  Take 1 tablet (50 mg total) by mouth every 8 (eight) hours as needed. Greater than 7 day supply medically necessary.     triamcinolone acetonide 0.1% 0.1 % cream  Commonly known as: KENALOG  Apply topically 2 (two) times daily as needed. For itch.         * This list has 2 medication(s) that are the same as other medications prescribed for you. Read the directions carefully, and ask your doctor or other care provider to review them with you.              Discharge Procedure Orders   Diet general     Call MD for:  severe uncontrolled pain     Call MD for:  difficulty breathing, headache or visual disturbances     Call MD for:  redness, tenderness, or signs of infection (pain, swelling, redness, odor or green/yellow discharge around incision site)     Activity as tolerated

## 2020-12-01 RX ORDER — SULFAMETHOXAZOLE AND TRIMETHOPRIM 800; 160 MG/1; MG/1
1 TABLET ORAL 2 TIMES DAILY
Qty: 14 TABLET | Refills: 0 | Status: CANCELLED | OUTPATIENT
Start: 2020-12-01

## 2020-12-02 ENCOUNTER — PATIENT MESSAGE (OUTPATIENT)
Dept: FAMILY MEDICINE | Facility: CLINIC | Age: 60
End: 2020-12-02

## 2020-12-04 RX ORDER — SULFAMETHOXAZOLE AND TRIMETHOPRIM 800; 160 MG/1; MG/1
1 TABLET ORAL 2 TIMES DAILY
Qty: 28 TABLET | Refills: 0 | Status: SHIPPED | OUTPATIENT
Start: 2020-12-04 | End: 2021-01-27 | Stop reason: SDUPTHER

## 2020-12-07 ENCOUNTER — PATIENT MESSAGE (OUTPATIENT)
Dept: SLEEP MEDICINE | Facility: HOSPITAL | Age: 60
End: 2020-12-07

## 2021-01-27 ENCOUNTER — PATIENT MESSAGE (OUTPATIENT)
Dept: FAMILY MEDICINE | Facility: CLINIC | Age: 61
End: 2021-01-27

## 2021-01-27 ENCOUNTER — OFFICE VISIT (OUTPATIENT)
Dept: OBSTETRICS AND GYNECOLOGY | Facility: CLINIC | Age: 61
End: 2021-01-27
Payer: COMMERCIAL

## 2021-01-27 VITALS
SYSTOLIC BLOOD PRESSURE: 134 MMHG | DIASTOLIC BLOOD PRESSURE: 86 MMHG | HEIGHT: 66 IN | WEIGHT: 222.69 LBS | BODY MASS INDEX: 35.79 KG/M2

## 2021-01-27 DIAGNOSIS — N63.10 BREAST MASS, RIGHT: Primary | ICD-10-CM

## 2021-01-27 PROCEDURE — 99999 PR PBB SHADOW E&M-EST. PATIENT-LVL III: ICD-10-PCS | Mod: PBBFAC,,, | Performed by: NURSE PRACTITIONER

## 2021-01-27 PROCEDURE — 99214 OFFICE O/P EST MOD 30 MIN: CPT | Mod: S$GLB,,, | Performed by: NURSE PRACTITIONER

## 2021-01-27 PROCEDURE — 99214 PR OFFICE/OUTPT VISIT, EST, LEVL IV, 30-39 MIN: ICD-10-PCS | Mod: S$GLB,,, | Performed by: NURSE PRACTITIONER

## 2021-01-27 PROCEDURE — 99999 PR PBB SHADOW E&M-EST. PATIENT-LVL III: CPT | Mod: PBBFAC,,, | Performed by: NURSE PRACTITIONER

## 2021-01-27 RX ORDER — SULFAMETHOXAZOLE AND TRIMETHOPRIM 800; 160 MG/1; MG/1
1 TABLET ORAL 2 TIMES DAILY
Qty: 14 TABLET | Refills: 0 | Status: SHIPPED | OUTPATIENT
Start: 2021-01-27 | End: 2021-04-19 | Stop reason: SDUPTHER

## 2021-01-27 RX ORDER — TRAMADOL HYDROCHLORIDE 50 MG/1
50 TABLET ORAL EVERY 8 HOURS PRN
COMMUNITY
Start: 2020-12-20 | End: 2024-01-29

## 2021-01-29 ENCOUNTER — PATIENT MESSAGE (OUTPATIENT)
Dept: RHEUMATOLOGY | Facility: CLINIC | Age: 61
End: 2021-01-29

## 2021-01-29 DIAGNOSIS — R60.0 PEDAL EDEMA: ICD-10-CM

## 2021-01-31 RX ORDER — FUROSEMIDE 20 MG/1
20 TABLET ORAL DAILY
Qty: 30 TABLET | Refills: 2 | Status: SHIPPED | OUTPATIENT
Start: 2021-01-31 | End: 2024-01-29

## 2021-04-16 ENCOUNTER — PATIENT MESSAGE (OUTPATIENT)
Dept: FAMILY MEDICINE | Facility: CLINIC | Age: 61
End: 2021-04-16

## 2021-04-19 RX ORDER — SULFAMETHOXAZOLE AND TRIMETHOPRIM 800; 160 MG/1; MG/1
1 TABLET ORAL 2 TIMES DAILY
Qty: 20 TABLET | Refills: 0 | Status: SHIPPED | OUTPATIENT
Start: 2021-04-19 | End: 2024-01-29

## 2021-05-06 ENCOUNTER — PATIENT MESSAGE (OUTPATIENT)
Dept: RESEARCH | Facility: HOSPITAL | Age: 61
End: 2021-05-06

## 2021-06-24 NOTE — TELEPHONE ENCOUNTER
----- Message from Fabio Rebollar sent at 10/23/2020  9:21 AM CDT -----  Review Chart,HSAT    
Chart reviewed for HSAT test   
+epigastric TTP

## 2022-03-30 DIAGNOSIS — Z12.31 OTHER SCREENING MAMMOGRAM: ICD-10-CM

## 2022-04-26 ENCOUNTER — PATIENT MESSAGE (OUTPATIENT)
Dept: ADMINISTRATIVE | Facility: HOSPITAL | Age: 62
End: 2022-04-26
Payer: COMMERCIAL

## 2022-04-27 ENCOUNTER — PATIENT MESSAGE (OUTPATIENT)
Dept: ADMINISTRATIVE | Facility: HOSPITAL | Age: 62
End: 2022-04-27
Payer: COMMERCIAL

## 2022-07-21 NOTE — TELEPHONE ENCOUNTER
Patient will call after f/u to get rescheduled for procedure once she is all cleared//dp   Results of CTA reviewed with Dr. Viet Lopez.  Dr. Lopez would like patient to be seen by Dr. Escobar in [place] for follow up.  Called and left message to call office back to discuss results.

## 2022-09-01 ENCOUNTER — PATIENT OUTREACH (OUTPATIENT)
Dept: ADMINISTRATIVE | Facility: HOSPITAL | Age: 62
End: 2022-09-01
Payer: COMMERCIAL

## 2022-09-01 NOTE — PROGRESS NOTES
Working the Non-Complaint Mammogram Report: Called patient to discuss scheduling a mammogram appointment Patient states she is no longer with [facility] she has moved to [place]

## 2022-09-14 ENCOUNTER — PATIENT OUTREACH (OUTPATIENT)
Dept: ADMINISTRATIVE | Facility: HOSPITAL | Age: 62
End: 2022-09-14
Payer: COMMERCIAL

## 2022-09-14 NOTE — PROGRESS NOTES
Called patient to schedule overdue PCP appt, Patient answered and stated that she moved to [place] and to stop calling.

## 2023-07-07 ENCOUNTER — PATIENT MESSAGE (OUTPATIENT)
Dept: INFECTIOUS DISEASES | Facility: CLINIC | Age: 63
End: 2023-07-07
Payer: COMMERCIAL

## 2024-01-29 ENCOUNTER — OFFICE VISIT (OUTPATIENT)
Dept: FAMILY MEDICINE | Facility: CLINIC | Age: 64
End: 2024-01-29
Payer: COMMERCIAL

## 2024-01-29 ENCOUNTER — LAB VISIT (OUTPATIENT)
Dept: LAB | Facility: CLINIC | Age: 64
End: 2024-01-29
Payer: COMMERCIAL

## 2024-01-29 VITALS
TEMPERATURE: 98 F | SYSTOLIC BLOOD PRESSURE: 138 MMHG | DIASTOLIC BLOOD PRESSURE: 86 MMHG | HEART RATE: 71 BPM | WEIGHT: 228 LBS | OXYGEN SATURATION: 97 % | BODY MASS INDEX: 36.64 KG/M2 | HEIGHT: 66 IN

## 2024-01-29 DIAGNOSIS — E55.9 VITAMIN D DEFICIENCY: ICD-10-CM

## 2024-01-29 DIAGNOSIS — Z12.2 SCREENING FOR LUNG CANCER: ICD-10-CM

## 2024-01-29 DIAGNOSIS — Z87.891 HISTORY OF TOBACCO USE: ICD-10-CM

## 2024-01-29 DIAGNOSIS — Z23 NEED FOR INFLUENZA VACCINATION: ICD-10-CM

## 2024-01-29 DIAGNOSIS — Z00.00 WELLNESS EXAMINATION: Primary | ICD-10-CM

## 2024-01-29 DIAGNOSIS — Z00.00 WELLNESS EXAMINATION: ICD-10-CM

## 2024-01-29 DIAGNOSIS — Z12.11 COLON CANCER SCREENING: ICD-10-CM

## 2024-01-29 DIAGNOSIS — Z12.31 ENCOUNTER FOR SCREENING MAMMOGRAM FOR MALIGNANT NEOPLASM OF BREAST: ICD-10-CM

## 2024-01-29 DIAGNOSIS — M15.9 PRIMARY OSTEOARTHRITIS INVOLVING MULTIPLE JOINTS: ICD-10-CM

## 2024-01-29 DIAGNOSIS — E03.9 HYPOTHYROIDISM, UNSPECIFIED TYPE: ICD-10-CM

## 2024-01-29 DIAGNOSIS — L98.9 SKIN LESION OF FACE: ICD-10-CM

## 2024-01-29 DIAGNOSIS — R73.03 PREDIABETES: ICD-10-CM

## 2024-01-29 DIAGNOSIS — M79.89 SWELLING OF LOWER EXTREMITY: ICD-10-CM

## 2024-01-29 DIAGNOSIS — E78.5 DYSLIPIDEMIA: ICD-10-CM

## 2024-01-29 DIAGNOSIS — I10 PRIMARY HYPERTENSION: ICD-10-CM

## 2024-01-29 PROBLEM — Z72.0 TOBACCO ABUSE: Status: RESOLVED | Noted: 2020-09-08 | Resolved: 2024-01-29

## 2024-01-29 PROBLEM — F17.210 DEPENDENCE ON NICOTINE FROM CIGARETTES: Status: RESOLVED | Noted: 2020-02-03 | Resolved: 2024-01-29

## 2024-01-29 LAB
ALBUMIN SERPL BCP-MCNC: 3.8 G/DL (ref 3.5–5.2)
ALP SERPL-CCNC: 66 U/L (ref 55–135)
ALT SERPL W/O P-5'-P-CCNC: 22 U/L (ref 10–44)
ANION GAP SERPL CALC-SCNC: 10 MMOL/L (ref 8–16)
AST SERPL-CCNC: 19 U/L (ref 10–40)
BASOPHILS # BLD AUTO: 0.08 K/UL (ref 0–0.2)
BASOPHILS NFR BLD: 1.8 % (ref 0–1.9)
BILIRUB SERPL-MCNC: 0.3 MG/DL (ref 0.1–1)
BUN SERPL-MCNC: 13 MG/DL (ref 8–23)
CALCIUM SERPL-MCNC: 8.8 MG/DL (ref 8.7–10.5)
CHLORIDE SERPL-SCNC: 107 MMOL/L (ref 95–110)
CHOLEST SERPL-MCNC: 214 MG/DL (ref 120–199)
CHOLEST/HDLC SERPL: 3.5 {RATIO} (ref 2–5)
CO2 SERPL-SCNC: 22 MMOL/L (ref 23–29)
CREAT SERPL-MCNC: 0.8 MG/DL (ref 0.5–1.4)
DIFFERENTIAL METHOD BLD: NORMAL
EOSINOPHIL # BLD AUTO: 0.2 K/UL (ref 0–0.5)
EOSINOPHIL NFR BLD: 3.4 % (ref 0–8)
ERYTHROCYTE [DISTWIDTH] IN BLOOD BY AUTOMATED COUNT: 12.6 % (ref 11.5–14.5)
EST. GFR  (NO RACE VARIABLE): >60 ML/MIN/1.73 M^2
ESTIMATED AVG GLUCOSE: 120 MG/DL (ref 68–131)
GLUCOSE SERPL-MCNC: 103 MG/DL (ref 70–110)
HBA1C MFR BLD: 5.8 % (ref 4–5.6)
HCT VFR BLD AUTO: 43.5 % (ref 37–48.5)
HDLC SERPL-MCNC: 61 MG/DL (ref 40–75)
HDLC SERPL: 28.5 % (ref 20–50)
HGB BLD-MCNC: 14.4 G/DL (ref 12–16)
IMM GRANULOCYTES # BLD AUTO: 0.02 K/UL (ref 0–0.04)
IMM GRANULOCYTES NFR BLD AUTO: 0.4 % (ref 0–0.5)
LDLC SERPL CALC-MCNC: 139 MG/DL (ref 63–159)
LYMPHOCYTES # BLD AUTO: 1.5 K/UL (ref 1–4.8)
LYMPHOCYTES NFR BLD: 32.5 % (ref 18–48)
MCH RBC QN AUTO: 28.1 PG (ref 27–31)
MCHC RBC AUTO-ENTMCNC: 33.1 G/DL (ref 32–36)
MCV RBC AUTO: 85 FL (ref 82–98)
MONOCYTES # BLD AUTO: 0.4 K/UL (ref 0.3–1)
MONOCYTES NFR BLD: 8.5 % (ref 4–15)
NEUTROPHILS # BLD AUTO: 2.4 K/UL (ref 1.8–7.7)
NEUTROPHILS NFR BLD: 53.4 % (ref 38–73)
NONHDLC SERPL-MCNC: 153 MG/DL
NRBC BLD-RTO: 0 /100 WBC
PLATELET # BLD AUTO: 259 K/UL (ref 150–450)
PMV BLD AUTO: 10.2 FL (ref 9.2–12.9)
POTASSIUM SERPL-SCNC: 4.4 MMOL/L (ref 3.5–5.1)
PROT SERPL-MCNC: 7.3 G/DL (ref 6–8.4)
RBC # BLD AUTO: 5.13 M/UL (ref 4–5.4)
SODIUM SERPL-SCNC: 139 MMOL/L (ref 136–145)
TRIGL SERPL-MCNC: 70 MG/DL (ref 30–150)
TSH SERPL DL<=0.005 MIU/L-ACNC: 2.82 UIU/ML (ref 0.4–4)
WBC # BLD AUTO: 4.46 K/UL (ref 3.9–12.7)

## 2024-01-29 PROCEDURE — 80053 COMPREHEN METABOLIC PANEL: CPT | Performed by: STUDENT IN AN ORGANIZED HEALTH CARE EDUCATION/TRAINING PROGRAM

## 2024-01-29 PROCEDURE — 85025 COMPLETE CBC W/AUTO DIFF WBC: CPT | Performed by: STUDENT IN AN ORGANIZED HEALTH CARE EDUCATION/TRAINING PROGRAM

## 2024-01-29 PROCEDURE — 1159F MED LIST DOCD IN RCRD: CPT | Mod: CPTII,S$GLB,, | Performed by: STUDENT IN AN ORGANIZED HEALTH CARE EDUCATION/TRAINING PROGRAM

## 2024-01-29 PROCEDURE — 90471 IMMUNIZATION ADMIN: CPT | Mod: S$GLB,,, | Performed by: STUDENT IN AN ORGANIZED HEALTH CARE EDUCATION/TRAINING PROGRAM

## 2024-01-29 PROCEDURE — 90686 IIV4 VACC NO PRSV 0.5 ML IM: CPT | Mod: S$GLB,,, | Performed by: STUDENT IN AN ORGANIZED HEALTH CARE EDUCATION/TRAINING PROGRAM

## 2024-01-29 PROCEDURE — 84443 ASSAY THYROID STIM HORMONE: CPT | Performed by: STUDENT IN AN ORGANIZED HEALTH CARE EDUCATION/TRAINING PROGRAM

## 2024-01-29 PROCEDURE — 80061 LIPID PANEL: CPT | Performed by: STUDENT IN AN ORGANIZED HEALTH CARE EDUCATION/TRAINING PROGRAM

## 2024-01-29 PROCEDURE — 83036 HEMOGLOBIN GLYCOSYLATED A1C: CPT | Performed by: STUDENT IN AN ORGANIZED HEALTH CARE EDUCATION/TRAINING PROGRAM

## 2024-01-29 PROCEDURE — 3008F BODY MASS INDEX DOCD: CPT | Mod: CPTII,S$GLB,, | Performed by: STUDENT IN AN ORGANIZED HEALTH CARE EDUCATION/TRAINING PROGRAM

## 2024-01-29 PROCEDURE — 3079F DIAST BP 80-89 MM HG: CPT | Mod: CPTII,S$GLB,, | Performed by: STUDENT IN AN ORGANIZED HEALTH CARE EDUCATION/TRAINING PROGRAM

## 2024-01-29 PROCEDURE — 3075F SYST BP GE 130 - 139MM HG: CPT | Mod: CPTII,S$GLB,, | Performed by: STUDENT IN AN ORGANIZED HEALTH CARE EDUCATION/TRAINING PROGRAM

## 2024-01-29 PROCEDURE — 36415 COLL VENOUS BLD VENIPUNCTURE: CPT | Mod: ,,, | Performed by: STUDENT IN AN ORGANIZED HEALTH CARE EDUCATION/TRAINING PROGRAM

## 2024-01-29 PROCEDURE — 1160F RVW MEDS BY RX/DR IN RCRD: CPT | Mod: CPTII,S$GLB,, | Performed by: STUDENT IN AN ORGANIZED HEALTH CARE EDUCATION/TRAINING PROGRAM

## 2024-01-29 PROCEDURE — 99386 PREV VISIT NEW AGE 40-64: CPT | Mod: 25,S$GLB,, | Performed by: STUDENT IN AN ORGANIZED HEALTH CARE EDUCATION/TRAINING PROGRAM

## 2024-01-29 RX ORDER — ATORVASTATIN CALCIUM 10 MG/1
10 TABLET, FILM COATED ORAL DAILY
COMMUNITY
End: 2024-01-29 | Stop reason: SDUPTHER

## 2024-01-29 RX ORDER — DICLOFENAC POTASSIUM 50 MG/1
50 TABLET, FILM COATED ORAL 2 TIMES DAILY
COMMUNITY
End: 2024-01-29 | Stop reason: SDUPTHER

## 2024-01-29 RX ORDER — DULOXETIN HYDROCHLORIDE 30 MG/1
30 CAPSULE, DELAYED RELEASE ORAL DAILY
Qty: 90 CAPSULE | Refills: 1 | Status: SHIPPED | OUTPATIENT
Start: 2024-01-29 | End: 2024-02-29

## 2024-01-29 RX ORDER — HYDROCHLOROTHIAZIDE 25 MG/1
25 TABLET ORAL DAILY
Qty: 90 TABLET | Refills: 1 | Status: SHIPPED | OUTPATIENT
Start: 2024-01-29 | End: 2025-01-28

## 2024-01-29 RX ORDER — METFORMIN HYDROCHLORIDE 500 MG/1
500 TABLET ORAL 2 TIMES DAILY WITH MEALS
COMMUNITY
End: 2024-01-29 | Stop reason: SDUPTHER

## 2024-01-29 RX ORDER — ERGOCALCIFEROL 1.25 MG/1
50000 CAPSULE ORAL
COMMUNITY
End: 2024-01-29 | Stop reason: SDUPTHER

## 2024-01-29 RX ORDER — ERGOCALCIFEROL 1.25 MG/1
50000 CAPSULE ORAL
Qty: 12 CAPSULE | Refills: 3 | Status: SHIPPED | OUTPATIENT
Start: 2024-01-29 | End: 2025-01-28

## 2024-01-29 RX ORDER — LEVOTHYROXINE SODIUM 50 UG/1
50 TABLET ORAL
COMMUNITY
End: 2024-05-07

## 2024-01-29 RX ORDER — ATORVASTATIN CALCIUM 10 MG/1
10 TABLET, FILM COATED ORAL DAILY
Qty: 30 TABLET | Refills: 5 | Status: SHIPPED | OUTPATIENT
Start: 2024-01-29 | End: 2025-01-28

## 2024-01-29 RX ORDER — METFORMIN HYDROCHLORIDE 500 MG/1
500 TABLET ORAL 2 TIMES DAILY WITH MEALS
Qty: 180 TABLET | Refills: 1 | Status: SHIPPED | OUTPATIENT
Start: 2024-01-29 | End: 2024-01-31

## 2024-01-29 RX ORDER — DICLOFENAC POTASSIUM 50 MG/1
50 TABLET, FILM COATED ORAL 2 TIMES DAILY
Qty: 60 TABLET | Refills: 2 | Status: SHIPPED | OUTPATIENT
Start: 2024-01-29

## 2024-01-29 RX ORDER — HYDROCHLOROTHIAZIDE 12.5 MG/1
12.5 TABLET ORAL DAILY
COMMUNITY
End: 2024-01-29 | Stop reason: SDUPTHER

## 2024-01-29 NOTE — PATIENT INSTRUCTIONS
Ricki Harrell,     If you are due for any health screening(s) below please notify me so we can arrange them to be ordered and scheduled. Most healthy patients at your age complete them, but you are free to accept or refuse.     If you can't do it, I'll definitely understand. If you can, I'd certainly appreciate it!    Tests to Keep You Healthy    Mammogram: ORDERED BUT NOT SCHEDULED  Colon Cancer Screening: Met on 9/11/2020      Schedule your breast cancer screening today     Breast cancer is the second most common cancer in women,  and the second leading cause of death from cancer. Mammograms can detect breast cancer early, which significantly increases the chances of curing the cancer.       Our records indicate that you may be overdue for breast cancer screening. Cancer screenings save lives, so schedule yours today to stay healthy.     If you recently had a mammogram performed outside of [facility], please let your Health care team know so that they can update your health record.
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
## 2024-01-31 ENCOUNTER — NURSE TRIAGE (OUTPATIENT)
Dept: ADMINISTRATIVE | Facility: CLINIC | Age: 64
End: 2024-01-31
Payer: COMMERCIAL

## 2024-01-31 ENCOUNTER — TELEPHONE (OUTPATIENT)
Dept: FAMILY MEDICINE | Facility: CLINIC | Age: 64
End: 2024-01-31
Payer: COMMERCIAL

## 2024-01-31 DIAGNOSIS — R73.03 PREDIABETES: ICD-10-CM

## 2024-01-31 RX ORDER — METFORMIN HYDROCHLORIDE 500 MG/1
500 TABLET ORAL
Qty: 90 TABLET | Refills: 1 | Status: SHIPPED | OUTPATIENT
Start: 2024-01-31 | End: 2025-01-30

## 2024-01-31 NOTE — TELEPHONE ENCOUNTER
Spoke with pt who reports that her metformin dose was doubled by provider recently. States she took 1 dose in the morning, and took another dose around 8pm last night, and states around 9:30 she became nausea, with diarrhea, shaking, and sweats. Pt states she is unable to check BG due to not having glucometer. Pt advised to be seen in ED/UC. Verbalized understanding    Reason for Disposition   Patient sounds very sick or weak to the triager    Additional Information   Negative: Shock suspected (e.g., cold/pale/clammy skin, too weak to stand, low BP, rapid pulse)   Negative: Sounds like a life-threatening emergency to the triager   Negative: Unable to walk, or can only walk with assistance (e.g., requires support)   Negative: Difficulty breathing   Negative: Insulin-dependent diabetes (Type I) and glucose > 400 mg/dL (22 mmol/L)   Negative: Drinking very little and dehydration suspected (e.g., no urine > 12 hours, very dry mouth, very lightheaded)    Protocols used: Nausea-A-OH

## 2024-01-31 NOTE — TELEPHONE ENCOUNTER
----- Message from Louann Whalen sent at 1/31/2024  9:55 AM CST -----  Type:  Needs Medical Advice    Who Called: pt  Symptoms (please be specific): Nausea, shaking, diarrhea and sweating   How long has patient had these symptoms:  All night  Pharmacy name and phone #:  [id number] - [place], [place] - [address]   Would the patient rather a call back or a response via [facility]? call  Best Call Back Number:  [phone]  Additional Information:  Is this normal side effects of metFORMIN (GLUCOPHAGE) 500 MG tabletor what do she need to do.

## 2024-02-09 ENCOUNTER — OFFICE VISIT (OUTPATIENT)
Dept: FAMILY MEDICINE | Facility: CLINIC | Age: 64
End: 2024-02-09
Payer: COMMERCIAL

## 2024-02-09 VITALS
DIASTOLIC BLOOD PRESSURE: 70 MMHG | OXYGEN SATURATION: 97 % | SYSTOLIC BLOOD PRESSURE: 126 MMHG | HEART RATE: 69 BPM | HEIGHT: 66 IN | BODY MASS INDEX: 35.46 KG/M2 | WEIGHT: 220.63 LBS

## 2024-02-09 DIAGNOSIS — L03.818 CELLULITIS OF OTHER SPECIFIED SITE: Primary | ICD-10-CM

## 2024-02-09 PROCEDURE — 99213 OFFICE O/P EST LOW 20 MIN: CPT | Mod: S$GLB,,, | Performed by: STUDENT IN AN ORGANIZED HEALTH CARE EDUCATION/TRAINING PROGRAM

## 2024-02-09 PROCEDURE — 3044F HG A1C LEVEL LT 7.0%: CPT | Mod: CPTII,S$GLB,, | Performed by: STUDENT IN AN ORGANIZED HEALTH CARE EDUCATION/TRAINING PROGRAM

## 2024-02-09 PROCEDURE — 3074F SYST BP LT 130 MM HG: CPT | Mod: CPTII,S$GLB,, | Performed by: STUDENT IN AN ORGANIZED HEALTH CARE EDUCATION/TRAINING PROGRAM

## 2024-02-09 PROCEDURE — 3008F BODY MASS INDEX DOCD: CPT | Mod: CPTII,S$GLB,, | Performed by: STUDENT IN AN ORGANIZED HEALTH CARE EDUCATION/TRAINING PROGRAM

## 2024-02-09 PROCEDURE — 3078F DIAST BP <80 MM HG: CPT | Mod: CPTII,S$GLB,, | Performed by: STUDENT IN AN ORGANIZED HEALTH CARE EDUCATION/TRAINING PROGRAM

## 2024-02-09 PROCEDURE — 1160F RVW MEDS BY RX/DR IN RCRD: CPT | Mod: CPTII,S$GLB,, | Performed by: STUDENT IN AN ORGANIZED HEALTH CARE EDUCATION/TRAINING PROGRAM

## 2024-02-09 PROCEDURE — 1159F MED LIST DOCD IN RCRD: CPT | Mod: CPTII,S$GLB,, | Performed by: STUDENT IN AN ORGANIZED HEALTH CARE EDUCATION/TRAINING PROGRAM

## 2024-02-09 RX ORDER — MUPIROCIN 20 MG/G
OINTMENT TOPICAL 3 TIMES DAILY
Qty: 22 G | Refills: 0 | Status: SHIPPED | OUTPATIENT
Start: 2024-02-09

## 2024-02-09 RX ORDER — SULFAMETHOXAZOLE AND TRIMETHOPRIM 800; 160 MG/1; MG/1
1 TABLET ORAL 2 TIMES DAILY
Qty: 14 TABLET | Refills: 0 | Status: SHIPPED | OUTPATIENT
Start: 2024-02-09 | End: 2024-02-29

## 2024-02-09 NOTE — PROGRESS NOTES
[facility]  Primary Care Clinic - [place], [place]    Family Medicine Office Visit    Subjective     Patient ID: Sharri House is a 63 y.o. female who presents to clinic today for an acute visit.     Medical history, surgical history, medications, allergies, and social history were reviewed and updated.     Chief Complaint: Follow-up (Pt has a staph infection. Pt states that she has had it for 4 days. )    Follow-up        She reported that she believes she may have a skin infection in her groin area for the last few days.  Reported that she has had this multiple times before.  Reports that this is usually treated with Bactrim and improves.  No reported fever or chills.  No streaking.  Offered to evaluate the area, but patient declined.  She feels that she will be able to control the infection on her own with antibiotics.    Vitals:    02/09/24 1114   BP: 126/70   Pulse: 69      Wt Readings from Last 3 Encounters:   02/09/24 1114 100.1 kg (220 lb 9.6 oz)   01/29/24 0940 103.4 kg (228 lb)   01/27/21 1559 101 kg (222 lb 10.6 oz)      Review of Systems    Review of Systems otherwise negative unless specified above.        Objective     Physical Exam  Vitals reviewed.   Constitutional:       General: She is not in acute distress.     Appearance: Normal appearance. She is obese.   HENT:      Head: Normocephalic and atraumatic.      Nose: Nose normal.      Mouth/Throat:      Mouth: Mucous membranes are moist.   Cardiovascular:      Rate and Rhythm: Normal rate and regular rhythm.      Heart sounds: No murmur heard.  Pulmonary:      Effort: Pulmonary effort is normal. No respiratory distress.      Breath sounds: Normal breath sounds.   Musculoskeletal:         General: Normal range of motion.   Skin:     General: Skin is warm and dry.   Neurological:      General: No focal deficit present.      Mental Status: She is alert and oriented to person, place, and time.   Psychiatric:         Mood and Affect: Mood  normal.         Behavior: Behavior normal.            Assessment and Plan     Current Outpatient Medications   Medication Instructions    atorvastatin (LIPITOR) 10 mg, Oral, Daily    diclofenac (CATAFLAM) 50 mg, Oral, 2 times daily    DULoxetine (CYMBALTA) 30 mg, Oral, Daily    ergocalciferol (VITAMIN D2) 50,000 Units, Oral, Every 7 days    hydroCHLOROthiazide (HYDRODIURIL) 25 mg, Oral, Daily    levothyroxine (SYNTHROID) 50 mcg, Oral, Before breakfast    metFORMIN (GLUCOPHAGE) 500 mg, Oral, With breakfast    mupirocin (BACTROBAN) 2 % ointment Topical (Top), 3 times daily    sulfamethoxazole-trimethoprim 800-160mg (BACTRIM DS) 800-160 mg Tab 1 tablet, Oral, 2 times daily        1. Cellulitis of other specified site  -     mupirocin (BACTROBAN) 2 % ointment; Apply topically 3 (three) times daily.  Dispense: 22 g; Refill: 0  -     sulfamethoxazole-trimethoprim 800-160mg (BACTRIM DS) 800-160 mg Tab; Take 1 tablet by mouth 2 (two) times daily. for 7 days  Dispense: 14 tablet; Refill: 0        We will plan to treat with Bactrim for 7 days as she has been on this previously with good effect.  We will also start Bactroban as a topical treatment.  Discussed that if her symptoms worsen or fail to improve she should return to clinic for further evaluation so that we may be able to observe area.  Also recommended that she discuss her frequent skin infections with Dermatology to see if there are any other options to help prevent them in the future.  We will otherwise plan to keep regularly scheduled follow up.         Follow up if symptoms worsen or fail to improve, for Keep scheduled follow up with Nikita.    Questions were invited and answered. No other acute concerns at this time. Will plan to follow up as above or sooner if needed.     Radha Shelton,   02/09/2024 12:05 PM

## 2024-02-09 NOTE — PATIENT INSTRUCTIONS
Ricki Harrell,     If you are due for any health screening(s) below please notify me so we can arrange them to be ordered and scheduled. Most healthy patients at your age complete them, but you are free to accept or refuse.     If you can't do it, I'll definitely understand. If you can, I'd certainly appreciate it!    Tests to Keep You Healthy    Mammogram: ORDERED BUT NOT SCHEDULED  Colon Cancer Screening: Met on 9/11/2020  Last Blood Pressure <= 139/89 (2/9/2024): Yes      Schedule your breast cancer screening today     Breast cancer is the second most common cancer in women,  and the second leading cause of death from cancer. Mammograms can detect breast cancer early, which significantly increases the chances of curing the cancer.       Our records indicate that you may be overdue for breast cancer screening. Cancer screenings save lives, so schedule yours today to stay healthy.     If you recently had a mammogram performed outside of [facility], please let your Health care team know so that they can update your health record.

## 2024-02-28 ENCOUNTER — PATIENT MESSAGE (OUTPATIENT)
Dept: FAMILY MEDICINE | Facility: CLINIC | Age: 64
End: 2024-02-28
Payer: COMMERCIAL

## 2024-02-29 ENCOUNTER — OFFICE VISIT (OUTPATIENT)
Dept: FAMILY MEDICINE | Facility: CLINIC | Age: 64
End: 2024-02-29
Payer: COMMERCIAL

## 2024-02-29 VITALS
HEART RATE: 86 BPM | WEIGHT: 211.81 LBS | OXYGEN SATURATION: 97 % | BODY MASS INDEX: 34.04 KG/M2 | SYSTOLIC BLOOD PRESSURE: 136 MMHG | HEIGHT: 66 IN | DIASTOLIC BLOOD PRESSURE: 86 MMHG | TEMPERATURE: 98 F

## 2024-02-29 DIAGNOSIS — E66.09 CLASS 1 OBESITY DUE TO EXCESS CALORIES WITHOUT SERIOUS COMORBIDITY WITH BODY MASS INDEX (BMI) OF 34.0 TO 34.9 IN ADULT: ICD-10-CM

## 2024-02-29 DIAGNOSIS — L03.818 CELLULITIS OF OTHER SPECIFIED SITE: ICD-10-CM

## 2024-02-29 DIAGNOSIS — R11.0 NAUSEA: Primary | ICD-10-CM

## 2024-02-29 DIAGNOSIS — M15.9 PRIMARY OSTEOARTHRITIS INVOLVING MULTIPLE JOINTS: ICD-10-CM

## 2024-02-29 PROCEDURE — 3044F HG A1C LEVEL LT 7.0%: CPT | Mod: CPTII,S$GLB,, | Performed by: STUDENT IN AN ORGANIZED HEALTH CARE EDUCATION/TRAINING PROGRAM

## 2024-02-29 PROCEDURE — 1160F RVW MEDS BY RX/DR IN RCRD: CPT | Mod: CPTII,S$GLB,, | Performed by: STUDENT IN AN ORGANIZED HEALTH CARE EDUCATION/TRAINING PROGRAM

## 2024-02-29 PROCEDURE — 3075F SYST BP GE 130 - 139MM HG: CPT | Mod: CPTII,S$GLB,, | Performed by: STUDENT IN AN ORGANIZED HEALTH CARE EDUCATION/TRAINING PROGRAM

## 2024-02-29 PROCEDURE — 99214 OFFICE O/P EST MOD 30 MIN: CPT | Mod: S$GLB,,, | Performed by: STUDENT IN AN ORGANIZED HEALTH CARE EDUCATION/TRAINING PROGRAM

## 2024-02-29 PROCEDURE — 3008F BODY MASS INDEX DOCD: CPT | Mod: CPTII,S$GLB,, | Performed by: STUDENT IN AN ORGANIZED HEALTH CARE EDUCATION/TRAINING PROGRAM

## 2024-02-29 PROCEDURE — 1159F MED LIST DOCD IN RCRD: CPT | Mod: CPTII,S$GLB,, | Performed by: STUDENT IN AN ORGANIZED HEALTH CARE EDUCATION/TRAINING PROGRAM

## 2024-02-29 PROCEDURE — 3079F DIAST BP 80-89 MM HG: CPT | Mod: CPTII,S$GLB,, | Performed by: STUDENT IN AN ORGANIZED HEALTH CARE EDUCATION/TRAINING PROGRAM

## 2024-02-29 RX ORDER — DOXYCYCLINE 100 MG/1
100 CAPSULE ORAL 2 TIMES DAILY
Qty: 14 CAPSULE | Refills: 0 | Status: SHIPPED | OUTPATIENT
Start: 2024-02-29 | End: 2024-03-07

## 2024-02-29 RX ORDER — DULOXETIN HYDROCHLORIDE 30 MG/1
30 CAPSULE, DELAYED RELEASE ORAL DAILY
Qty: 90 CAPSULE | Refills: 1 | Status: SHIPPED | OUTPATIENT
Start: 2024-02-29 | End: 2024-05-07

## 2024-02-29 NOTE — PROGRESS NOTES
[facility]  Primary Care Clinic - [place], [place]    Family Medicine Office Visit    Subjective     Patient ID: Sharri House is a 63 y.o. female who presents to clinic today to follow up.     Medical history, surgical history, medications, allergies, and social history were reviewed and updated.     Chief Complaint: Follow-up (Patient here to discuss medication )    HPI    Nausea  She reported some nausea over the last few weeks.  She is concerned that this has related to her recent initiation of Cymbalta.  Reported that she was taking this medication in the past around 2020 and did not have this side effect.  She reported that she is planning on holding this medication for the next few days to see if it improves her symptoms.  We discussed that metformin can sometimes cause this symptom as well.  Discussed that should her symptoms not improve with holding her Cymbalta I would recommend letting me know so that way we can try her on metformin extended release.  Discussed that her symptoms worsen or fail to improve, she should return to clinic for further evaluation.    Cellulitis of groin  She had presented to clinic on 02/09/2024 with cellulitis of the groin.  I treated her with Bactrim for 1 week.  She reported initial improvement in this location but it was not fully resolve.  She was requesting a different antibiotic to fully treat her symptoms.  Reports she is continued to use Bactroban with minimal improvement.  We will plan to send in doxycycline.  Discussed that should this not resolve again, would recommend evaluation of the area.    Obesity   She has a history of obesity with current weight of 211 lb and BMI of 34.2.  She is lost 17 lb since January of this year.  Reported that she is working on her diet and physical activity.  Congratulated her on her weight loss, and recommended that she continue her current regimen.    Vitals:    02/29/24 0903   BP: 136/86   Pulse: 86   Temp: 98.2 °F (36.8  °C)      Wt Readings from Last 3 Encounters:   02/29/24 0903 96.1 kg (211 lb 12.8 oz)   02/09/24 1114 100.1 kg (220 lb 9.6 oz)   01/29/24 0940 103.4 kg (228 lb)      Review of Systems    Review of Systems otherwise negative unless specified above.        Objective     Physical Exam  Vitals reviewed.   Constitutional:       General: She is not in acute distress.     Appearance: Normal appearance. She is obese.   HENT:      Head: Normocephalic and atraumatic.      Nose: Nose normal.      Mouth/Throat:      Mouth: Mucous membranes are moist.   Cardiovascular:      Rate and Rhythm: Normal rate and regular rhythm.      Heart sounds: No murmur heard.  Pulmonary:      Effort: Pulmonary effort is normal. No respiratory distress.      Breath sounds: Normal breath sounds.   Musculoskeletal:         General: Normal range of motion.   Skin:     General: Skin is warm and dry.   Neurological:      General: No focal deficit present.      Mental Status: She is alert and oriented to person, place, and time.   Psychiatric:         Mood and Affect: Mood normal.         Behavior: Behavior normal.            Assessment and Plan     Current Outpatient Medications   Medication Instructions    atorvastatin (LIPITOR) 10 mg, Oral, Daily    diclofenac (CATAFLAM) 50 mg, Oral, 2 times daily    doxycycline (VIBRAMYCIN) 100 mg, Oral, 2 times daily    DULoxetine (CYMBALTA) 30 mg, Oral, Daily    ergocalciferol (VITAMIN D2) 50,000 Units, Oral, Every 7 days    hydroCHLOROthiazide (HYDRODIURIL) 25 mg, Oral, Daily    levothyroxine (SYNTHROID) 50 mcg, Oral, Before breakfast    metFORMIN (GLUCOPHAGE) 500 mg, Oral, With breakfast    mupirocin (BACTROBAN) 2 % ointment Topical (Top), 3 times daily        1. Nausea    2. Primary osteoarthritis involving multiple joints  -     DULoxetine (CYMBALTA) 30 MG capsule; Take 1 capsule (30 mg total) by mouth once daily.  Dispense: 90 capsule; Refill: 1    3. Cellulitis of other specified site  -     doxycycline  (VIBRAMYCIN) 100 MG Cap; Take 1 capsule (100 mg total) by mouth 2 (two) times daily. for 7 days  Dispense: 14 capsule; Refill: 0    4. Class 1 obesity due to excess calories without serious comorbidity with body mass index (BMI) of 34.0 to 34.9 in adult        She will hold her Cymbalta on her own for the next few days and will let me know if I need to discontinue this medication if it is the culprit for her symptoms.  Should her symptoms not improve, recommended that she continue the Cymbalta and let me know and we can switch her from metformin to metformin extended release.  I will start her on doxycycline twice daily to treat her cellulitis.  Discussed that if again fails to improve, would recommend further evaluation by myself or Dermatology.  Recommend that she continue her current regimen for her obesity as her weight is improving.  Otherwise, we will plan to have her follow up in 5 months or sooner if needed.         Follow up in about 5 months (around 7/29/2024) for Follow up with Nikita.    Questions were invited and answered. No other acute concerns at this time. Will plan to follow up as above or sooner if needed.     Radha Shelton, DO  02/29/2024 9:23 AM

## 2024-02-29 NOTE — PATIENT INSTRUCTIONS
Ricki Harrell,     If you are due for any health screening(s) below please notify me so we can arrange them to be ordered and scheduled. Most healthy patients at your age complete them, but you are free to accept or refuse.     If you can't do it, I'll definitely understand. If you can, I'd certainly appreciate it!    Tests to Keep You Healthy    Mammogram: ORDERED BUT NOT SCHEDULED  Colon Cancer Screening: Met on 9/11/2020  Last Blood Pressure <= 139/89 (2/29/2024): Yes      Schedule your breast cancer screening today     Breast cancer is the second most common cancer in women,  and the second leading cause of death from cancer. Mammograms can detect breast cancer early, which significantly increases the chances of curing the cancer.       Our records indicate that you may be overdue for breast cancer screening. Cancer screenings save lives, so schedule yours today to stay healthy.     If you recently had a mammogram performed outside of [facility], please let your Health care team know so that they can update your health record.

## 2024-03-08 ENCOUNTER — TELEPHONE (OUTPATIENT)
Dept: FAMILY MEDICINE | Facility: CLINIC | Age: 64
End: 2024-03-08
Payer: COMMERCIAL

## 2024-03-08 NOTE — TELEPHONE ENCOUNTER
Order for Mammo has been faxed to [facility]. Fax number [phone],

## 2024-03-08 NOTE — TELEPHONE ENCOUNTER
----- Message from Lizz Coleman sent at 3/8/2024  8:59 AM CST -----  Regarding: mammo outside order request  Type: Needs Medical Advice    Who Called:  prabhakar from [facility] [place]    Best Call Back Number: [phone]    Additional Information: patient is coming in monday to [facility] for mammo and needs an order faxed over to [phone], please put ATTN PRABHAKAR. Thank you!

## 2024-03-14 ENCOUNTER — PATIENT MESSAGE (OUTPATIENT)
Dept: FAMILY MEDICINE | Facility: CLINIC | Age: 64
End: 2024-03-14
Payer: COMMERCIAL

## 2024-03-20 ENCOUNTER — PATIENT MESSAGE (OUTPATIENT)
Dept: ADMINISTRATIVE | Facility: HOSPITAL | Age: 64
End: 2024-03-20
Payer: COMMERCIAL

## 2024-03-22 ENCOUNTER — PATIENT OUTREACH (OUTPATIENT)
Dept: ADMINISTRATIVE | Facility: HOSPITAL | Age: 64
End: 2024-03-22
Payer: COMMERCIAL

## 2024-03-22 NOTE — LETTER
AUTHORIZATION FOR RELEASE OF   CONFIDENTIAL INFORMATION    Dear [facility],    We are seeing Sharri House, date of birth 1960, in the clinic at [facility]. Radha Shelton DO is the patient's PCP. Sharri House has an outstanding lab/procedure at the time we reviewed her chart. In order to help keep her health information updated, she has authorized us to request the following medical record(s):        ( X )  MAMMOGRAM                                      (  )  COLONOSCOPY      (  )  PAP SMEAR                                          (  )  OUTSIDE LAB RESULTS     (  )  DEXA SCAN                                          (  )  EYE EXAM            (  )  FOOT EXAM                                          (  )  ENTIRE RECORD     (  )  OUTSIDE IMMUNIZATIONS                 (  )  _______________         Please fax records to [facility] [place], [place], DO at [phone]    Thanks so much and have a great day!    Keiko Pearl LPN [facility]  [address] [place] [address]  P- [phone]  F [phone]           Patient Name: Sharri House  : 1960  Patient Phone #: [phone]

## 2024-03-22 NOTE — PROGRESS NOTES
Population Health Chart Review & Patient Outreach Details      Additional [facility] Health Notes:               Updates Requested / Reviewed:      Updated Care Coordination Note and Care Everywhere         Health Maintenance Topics Overdue:      VB Score: 1     Mammogram    Shingles/Zoster Vaccine  RSV Vaccine                  Health Maintenance Topic(s) Outreach Outcomes & Actions Taken:    Breast Cancer Screening - Outreach Outcomes & Actions Taken  : External Records Requested & Care Team Updated if Applicable

## 2024-04-03 ENCOUNTER — PATIENT MESSAGE (OUTPATIENT)
Dept: ADMINISTRATIVE | Facility: HOSPITAL | Age: 64
End: 2024-04-03
Payer: COMMERCIAL

## 2024-04-04 ENCOUNTER — PATIENT OUTREACH (OUTPATIENT)
Dept: ADMINISTRATIVE | Facility: HOSPITAL | Age: 64
End: 2024-04-04
Payer: COMMERCIAL

## 2024-04-04 NOTE — PROGRESS NOTES
Population Health Chart Review & Patient Outreach Details      Additional [facility] Health Notes:               Updates Requested / Reviewed:      Updated Care Coordination Note and Care Everywhere         Health Maintenance Topics Overdue:      VB Score: 1     Mammogram    Shingles/Zoster Vaccine  RSV Vaccine                  Health Maintenance Topic(s) Outreach Outcomes & Actions Taken:    Breast Cancer Screening - Outreach Outcomes & Actions Taken  : External Records Requested & Care Team Updated if Applicable

## 2024-04-04 NOTE — LETTER
AUTHORIZATION FOR RELEASE OF   CONFIDENTIAL INFORMATION    Dear [facility],    We are seeing Sharri House, date of birth 1960, in the clinic at [facility]. Radha Shelton DO is the patient's PCP. Sharri House has an outstanding lab/procedure at the time we reviewed her chart. In order to help keep her health information updated, she has authorized us to request the following medical record(s):        ( X )  MAMMOGRAM                                      (  )  COLONOSCOPY      (  )  PAP SMEAR                                          (  )  OUTSIDE LAB RESULTS     (  )  DEXA SCAN                                          (  )  EYE EXAM            (  )  FOOT EXAM                                          (  )  ENTIRE RECORD     (  )  OUTSIDE IMMUNIZATIONS                 (  )  _______________         Please fax records to [facility], [place], DO at [phone]    Thanks so much and have a great day!    Keiko Pearl LPN [facility]  [address] [place] [address]  P- [phone]  F- [phone]           Patient Name: Sharri House  : 1960  Patient Phone #: [phone]

## 2024-05-07 ENCOUNTER — PATIENT MESSAGE (OUTPATIENT)
Dept: FAMILY MEDICINE | Facility: CLINIC | Age: 64
End: 2024-05-07

## 2024-05-07 ENCOUNTER — OFFICE VISIT (OUTPATIENT)
Dept: FAMILY MEDICINE | Facility: CLINIC | Age: 64
End: 2024-05-07
Payer: COMMERCIAL

## 2024-05-07 VITALS
TEMPERATURE: 98 F | SYSTOLIC BLOOD PRESSURE: 128 MMHG | DIASTOLIC BLOOD PRESSURE: 65 MMHG | HEIGHT: 66 IN | WEIGHT: 220 LBS | HEART RATE: 64 BPM | BODY MASS INDEX: 35.36 KG/M2 | OXYGEN SATURATION: 97 %

## 2024-05-07 DIAGNOSIS — R42 VERTIGO: ICD-10-CM

## 2024-05-07 DIAGNOSIS — G62.9 NEUROPATHY: ICD-10-CM

## 2024-05-07 DIAGNOSIS — M79.89 SWELLING OF LOWER EXTREMITY: Primary | ICD-10-CM

## 2024-05-07 PROCEDURE — 1159F MED LIST DOCD IN RCRD: CPT | Mod: CPTII,S$GLB,, | Performed by: STUDENT IN AN ORGANIZED HEALTH CARE EDUCATION/TRAINING PROGRAM

## 2024-05-07 PROCEDURE — 3008F BODY MASS INDEX DOCD: CPT | Mod: CPTII,S$GLB,, | Performed by: STUDENT IN AN ORGANIZED HEALTH CARE EDUCATION/TRAINING PROGRAM

## 2024-05-07 PROCEDURE — 99214 OFFICE O/P EST MOD 30 MIN: CPT | Mod: S$GLB,,, | Performed by: STUDENT IN AN ORGANIZED HEALTH CARE EDUCATION/TRAINING PROGRAM

## 2024-05-07 PROCEDURE — 3044F HG A1C LEVEL LT 7.0%: CPT | Mod: CPTII,S$GLB,, | Performed by: STUDENT IN AN ORGANIZED HEALTH CARE EDUCATION/TRAINING PROGRAM

## 2024-05-07 PROCEDURE — 1160F RVW MEDS BY RX/DR IN RCRD: CPT | Mod: CPTII,S$GLB,, | Performed by: STUDENT IN AN ORGANIZED HEALTH CARE EDUCATION/TRAINING PROGRAM

## 2024-05-07 PROCEDURE — 3074F SYST BP LT 130 MM HG: CPT | Mod: CPTII,S$GLB,, | Performed by: STUDENT IN AN ORGANIZED HEALTH CARE EDUCATION/TRAINING PROGRAM

## 2024-05-07 PROCEDURE — G2211 COMPLEX E/M VISIT ADD ON: HCPCS | Mod: S$GLB,,, | Performed by: STUDENT IN AN ORGANIZED HEALTH CARE EDUCATION/TRAINING PROGRAM

## 2024-05-07 PROCEDURE — 3078F DIAST BP <80 MM HG: CPT | Mod: CPTII,S$GLB,, | Performed by: STUDENT IN AN ORGANIZED HEALTH CARE EDUCATION/TRAINING PROGRAM

## 2024-05-07 RX ORDER — AMITRIPTYLINE HYDROCHLORIDE 10 MG/1
10 TABLET, FILM COATED ORAL NIGHTLY PRN
Qty: 30 TABLET | Refills: 2 | Status: SHIPPED | OUTPATIENT
Start: 2024-05-07 | End: 2025-05-07

## 2024-05-07 RX ORDER — FUROSEMIDE 20 MG/1
20 TABLET ORAL DAILY PRN
Qty: 30 TABLET | Refills: 1 | Status: SHIPPED | OUTPATIENT
Start: 2024-05-07 | End: 2025-05-07

## 2024-05-07 RX ORDER — HYDROGEN PEROXIDE 3 %
20 SOLUTION, NON-ORAL MISCELLANEOUS
COMMUNITY

## 2024-05-07 NOTE — PATIENT INSTRUCTIONS
Ricki Harrell,     If you are due for any health screening(s) below please notify me so we can arrange them to be ordered and scheduled. Most healthy patients at your age complete them, but you are free to accept or refuse.     If you can't do it, I'll definitely understand. If you can, I'd certainly appreciate it!    All of your core healthy metrics are met.

## 2024-05-07 NOTE — PROGRESS NOTES
[facility]  Primary Care Clinic - [place], [place]    Family Medicine Office Visit    Subjective     Patient ID: Sharri House is a 63 y.o. female who presents to clinic today to follow up.     Medical history, surgical history, medications, allergies, and social history were reviewed and updated.     Chief Complaint: Dizziness    HPI    Bilateral foot swelling  Has had a history of this for years. Has been evaluated by Cardiology and Pulmonlogy in the past for this. Has been working on her diet and has been trying to lose weight. About 2 weeks ago, she felt lethargic and fatigued. Was forcing herself to work and was napping in the afternoons. This was going on for days and son became concerned. She stopped the cymbalta and felt better.  She was requesting a prescription of Lasix as needed.  We discussed the importance of not taking this medication regularly as it can lead to dehydration.  Recommended that she only take this medication for 2-3 days as needed in a row and then stop to prevent risk of dehydration.    Neuropathy   She reports a history of neuropathy that was previously well-controlled with duloxetine, but she discontinued due to complications as above.  We discussed a trial of Elavil at low dose to see if this will improve her symptoms as she was not able to tolerate gabapentin in her insurance refused to pay for were pregabalin until she did another trial of gabapentin.  She was agreeable with this plan.  We will initiate 10 mg dose.  She will touch base with me in a few weeks to discuss titration if needed.    Vertigo   She reported episodes of dizziness with the room spinning associated with laying on her left side or turning to the left too quickly.  We discussed that this is likely vertigo as she has inner ear issues as well.  She has not interested in getting on a medication for this at this time.  We discussed that meclizine could only be used as needed when she was having an  episode as well.  We did discuss the Epley maneuver and I provided her documentation on how to perform this at home.  She reports that she will try this and let me know if her symptoms are not improving.    Vitals:    05/07/24 1556   BP: 128/65   Pulse: 64   Temp: 98.2 °F (36.8 °C)      Wt Readings from Last 3 Encounters:   05/07/24 1556 99.8 kg (220 lb)   02/29/24 0903 96.1 kg (211 lb 12.8 oz)   02/09/24 1114 100.1 kg (220 lb 9.6 oz)      Review of Systems    Review of Systems otherwise negative unless specified above.        Objective     Physical Exam  Vitals reviewed.   Constitutional:       General: She is not in acute distress.     Appearance: Normal appearance. She is obese.   HENT:      Head: Normocephalic and atraumatic.      Nose: Nose normal.      Mouth/Throat:      Mouth: Mucous membranes are moist.   Cardiovascular:      Rate and Rhythm: Normal rate and regular rhythm.      Heart sounds: No murmur heard.  Pulmonary:      Effort: Pulmonary effort is normal. No respiratory distress.      Breath sounds: Normal breath sounds.   Musculoskeletal:         General: Normal range of motion.   Skin:     General: Skin is warm and dry.   Neurological:      General: No focal deficit present.      Mental Status: She is alert and oriented to person, place, and time.   Psychiatric:         Mood and Affect: Mood normal.         Behavior: Behavior normal.            Assessment and Plan     Current Outpatient Medications   Medication Instructions    amitriptyline (ELAVIL) 10 mg, Oral, Nightly PRN    atorvastatin (LIPITOR) 10 mg, Oral, Daily    diclofenac (CATAFLAM) 50 mg, Oral, 2 times daily    ergocalciferol (VITAMIN D2) 50,000 Units, Oral, Every 7 days    esomeprazole (NEXIUM) 20 mg, Oral, Before breakfast    furosemide (LASIX) 20 mg, Oral, Daily PRN    hydroCHLOROthiazide (HYDRODIURIL) 25 mg, Oral, Daily    metFORMIN (GLUCOPHAGE) 500 mg, Oral, With breakfast    mupirocin (BACTROBAN) 2 % ointment Topical (Top), 3 times  daily        1. Swelling of lower extremity  -     furosemide (LASIX) 20 MG tablet; Take 1 tablet (20 mg total) by mouth daily as needed (swelling).  Dispense: 30 tablet; Refill: 1    2. Neuropathy  -     amitriptyline (ELAVIL) 10 MG tablet; Take 1 tablet (10 mg total) by mouth nightly as needed for Pain.  Dispense: 30 tablet; Refill: 2    3. Vertigo        We will provide Lasix 20 mg daily as needed for swelling.  I will stop her duloxetine and start her on Elavil 10 mg daily.  She will let me know if she needs titration in his medication regimen.  For her vertigo, she was provided documentation on the Epley maneuver.  She was scheduled follow up with me in July but will let me know if she needs a sooner appointment and she will schedule it over the phone.    Visit today included increased complexity associated with the care of the episodic problem Neuropathy addressed and managing the longitudinal care of the patient due to the serious and/or complex managed problem(s) neuropathy.         Follow up if symptoms worsen or fail to improve.    Questions were invited and answered. No other acute concerns at this time. Will plan to follow up as above or sooner if needed.     Radha Shelton DO  05/07/2024 4:03 PM       This dictation has been generated using Modal Fluency Dictation. Some phonetic errors may occur. Please contact author for clarification if needed.

## 2024-06-04 NOTE — PROGRESS NOTES
Physician Progress Note      PATIENT:               LORAINE GALINDO  CSN #:                  [id number]  :                       1951  ADMIT DATE:       2024 10:55 AM  DISCH DATE:        2024 1:43 PM  RESPONDING  PROVIDER #:        Adair Rviera MD          QUERY TEXT:    Patient admitted with Cellulitis BLE with DM2 as well. Noted documentation of   cellulitis associated with DM, however A1c 5.5, with glucose 86 on admission,   and 105 max.  Please clarify the following:    The medical record reflects the following:  Risk Factors: DM2    Clinical Indicators:   A1c-5.5, glucose-86   glucose 97   glucose 105   glucose 103    H&P- BLE venous stasis w/ superimposed cellulitis   PN- BLE edema w/ superimposed cellulitis   pre-diabetes    Treatment: Zosyn/ Vanc, Bumex, regular low fat/ low chol diet    Thank you,  Shaista Castellon RN  Clinical Documentation  [phone], or via [employer]  Options provided:  -- BLE cellulitis associated with Diabetes, Please document evidence.  -- BLE cellulitis unrelated to Diabetes  -- Other - I will add my own diagnosis  -- Disagree - Not applicable / Not valid  -- Disagree - Clinically unable to determine / Unknown  -- Refer to Clinical Documentation Reviewer    PROVIDER RESPONSE TEXT:    BLE cellulitis unrelated to Diabetes.    Query created by: Shaista Castellon on 6/3/2024 10:39 AM      Electronically signed by:  Adair Rivera MD 2024 9:45 AM           Spirometry without bronchodilator completed.

## 2024-07-25 ENCOUNTER — LAB VISIT (OUTPATIENT)
Dept: LAB | Facility: CLINIC | Age: 64
End: 2024-07-25
Payer: COMMERCIAL

## 2024-07-25 ENCOUNTER — OFFICE VISIT (OUTPATIENT)
Dept: FAMILY MEDICINE | Facility: CLINIC | Age: 64
End: 2024-07-25
Payer: COMMERCIAL

## 2024-07-25 VITALS
SYSTOLIC BLOOD PRESSURE: 116 MMHG | WEIGHT: 220.81 LBS | BODY MASS INDEX: 35.49 KG/M2 | HEIGHT: 66 IN | TEMPERATURE: 98 F | DIASTOLIC BLOOD PRESSURE: 70 MMHG

## 2024-07-25 DIAGNOSIS — E66.01 SEVERE OBESITY (BMI 35.0-39.9) WITH COMORBIDITY: ICD-10-CM

## 2024-07-25 DIAGNOSIS — G62.9 NEUROPATHY: ICD-10-CM

## 2024-07-25 DIAGNOSIS — I10 PRIMARY HYPERTENSION: ICD-10-CM

## 2024-07-25 DIAGNOSIS — R73.03 PREDIABETES: Primary | ICD-10-CM

## 2024-07-25 DIAGNOSIS — E78.5 DYSLIPIDEMIA: ICD-10-CM

## 2024-07-25 DIAGNOSIS — R73.03 PREDIABETES: ICD-10-CM

## 2024-07-25 DIAGNOSIS — M79.89 SWELLING OF LOWER EXTREMITY: ICD-10-CM

## 2024-07-25 LAB
ALBUMIN SERPL BCP-MCNC: 3.2 G/DL (ref 3.5–5.2)
ANION GAP SERPL CALC-SCNC: 8 MMOL/L (ref 8–16)
BUN SERPL-MCNC: 17 MG/DL (ref 8–23)
CALCIUM SERPL-MCNC: 9 MG/DL (ref 8.7–10.5)
CHLORIDE SERPL-SCNC: 101 MMOL/L (ref 95–110)
CHOLEST SERPL-MCNC: 186 MG/DL (ref 120–199)
CHOLEST/HDLC SERPL: 3.5 {RATIO} (ref 2–5)
CO2 SERPL-SCNC: 30 MMOL/L (ref 23–29)
CREAT SERPL-MCNC: 0.8 MG/DL (ref 0.5–1.4)
EST. GFR  (NO RACE VARIABLE): >60 ML/MIN/1.73 M^2
ESTIMATED AVG GLUCOSE: 128 MG/DL (ref 68–131)
GLUCOSE SERPL-MCNC: 121 MG/DL (ref 70–110)
HBA1C MFR BLD: 6.1 % (ref 4–5.6)
HDLC SERPL-MCNC: 53 MG/DL (ref 40–75)
HDLC SERPL: 28.5 % (ref 20–50)
LDLC SERPL CALC-MCNC: 101.2 MG/DL (ref 63–159)
NONHDLC SERPL-MCNC: 133 MG/DL
PHOSPHATE SERPL-MCNC: 4 MG/DL (ref 2.7–4.5)
POTASSIUM SERPL-SCNC: 4.6 MMOL/L (ref 3.5–5.1)
SODIUM SERPL-SCNC: 139 MMOL/L (ref 136–145)
TRIGL SERPL-MCNC: 159 MG/DL (ref 30–150)

## 2024-07-25 PROCEDURE — 80069 RENAL FUNCTION PANEL: CPT | Performed by: STUDENT IN AN ORGANIZED HEALTH CARE EDUCATION/TRAINING PROGRAM

## 2024-07-25 PROCEDURE — 1159F MED LIST DOCD IN RCRD: CPT | Mod: CPTII,S$GLB,, | Performed by: STUDENT IN AN ORGANIZED HEALTH CARE EDUCATION/TRAINING PROGRAM

## 2024-07-25 PROCEDURE — 99214 OFFICE O/P EST MOD 30 MIN: CPT | Mod: S$GLB,,, | Performed by: STUDENT IN AN ORGANIZED HEALTH CARE EDUCATION/TRAINING PROGRAM

## 2024-07-25 PROCEDURE — 83036 HEMOGLOBIN GLYCOSYLATED A1C: CPT | Performed by: STUDENT IN AN ORGANIZED HEALTH CARE EDUCATION/TRAINING PROGRAM

## 2024-07-25 PROCEDURE — G2211 COMPLEX E/M VISIT ADD ON: HCPCS | Mod: S$GLB,,, | Performed by: STUDENT IN AN ORGANIZED HEALTH CARE EDUCATION/TRAINING PROGRAM

## 2024-07-25 PROCEDURE — 1160F RVW MEDS BY RX/DR IN RCRD: CPT | Mod: CPTII,S$GLB,, | Performed by: STUDENT IN AN ORGANIZED HEALTH CARE EDUCATION/TRAINING PROGRAM

## 2024-07-25 PROCEDURE — 3078F DIAST BP <80 MM HG: CPT | Mod: CPTII,S$GLB,, | Performed by: STUDENT IN AN ORGANIZED HEALTH CARE EDUCATION/TRAINING PROGRAM

## 2024-07-25 PROCEDURE — 3008F BODY MASS INDEX DOCD: CPT | Mod: CPTII,S$GLB,, | Performed by: STUDENT IN AN ORGANIZED HEALTH CARE EDUCATION/TRAINING PROGRAM

## 2024-07-25 PROCEDURE — 3074F SYST BP LT 130 MM HG: CPT | Mod: CPTII,S$GLB,, | Performed by: STUDENT IN AN ORGANIZED HEALTH CARE EDUCATION/TRAINING PROGRAM

## 2024-07-25 PROCEDURE — 80061 LIPID PANEL: CPT | Performed by: STUDENT IN AN ORGANIZED HEALTH CARE EDUCATION/TRAINING PROGRAM

## 2024-07-25 PROCEDURE — 36415 COLL VENOUS BLD VENIPUNCTURE: CPT | Mod: ,,, | Performed by: STUDENT IN AN ORGANIZED HEALTH CARE EDUCATION/TRAINING PROGRAM

## 2024-07-25 PROCEDURE — 3044F HG A1C LEVEL LT 7.0%: CPT | Mod: CPTII,S$GLB,, | Performed by: STUDENT IN AN ORGANIZED HEALTH CARE EDUCATION/TRAINING PROGRAM

## 2024-07-25 RX ORDER — METFORMIN HYDROCHLORIDE 500 MG/1
500 TABLET ORAL
Qty: 90 TABLET | Refills: 1 | Status: SHIPPED | OUTPATIENT
Start: 2024-07-25 | End: 2025-07-25

## 2024-07-25 RX ORDER — DULOXETIN HYDROCHLORIDE 30 MG/1
30 CAPSULE, DELAYED RELEASE ORAL DAILY
Qty: 90 CAPSULE | Refills: 1 | Status: SHIPPED | OUTPATIENT
Start: 2024-07-25 | End: 2025-07-25

## 2024-07-25 RX ORDER — ATORVASTATIN CALCIUM 10 MG/1
10 TABLET, FILM COATED ORAL DAILY
Qty: 90 TABLET | Refills: 1 | Status: SHIPPED | OUTPATIENT
Start: 2024-07-25 | End: 2025-07-25

## 2024-07-25 RX ORDER — HYDROCHLOROTHIAZIDE 25 MG/1
25 TABLET ORAL DAILY
Qty: 90 TABLET | Refills: 1 | Status: SHIPPED | OUTPATIENT
Start: 2024-07-25 | End: 2025-07-25

## 2024-07-25 RX ORDER — FUROSEMIDE 20 MG/1
20 TABLET ORAL DAILY PRN
Qty: 30 TABLET | Refills: 1 | Status: SHIPPED | OUTPATIENT
Start: 2024-07-25 | End: 2025-07-25

## 2024-07-25 NOTE — PROGRESS NOTES
"  [facility]  [facility] Clinic - [place], [place]    Family Medicine Office Visit    Subjective     Patient ID: Sharri House is a 63 y.o. female who presents to clinic today to follow up.     Medical history, surgical history, medications, allergies, and social history were reviewed and updated.     Chief Complaint: Follow-up and Medication Refill    Follow-up    Medication Refill        Prediabetes  She has a history of prediabetes with most recent A1c shown below.  Current medication regimen includes metformin 500 mg daily.  Requesting repeat A1c today for monitoring.  Reported that she has been trying to work on diet and exercise, but has been struggling.  She reported that she was also interested initiating Ozempic.  Discussed that this is likely not be covered by her insurance, but she reported that she was happy to pay cash price if needed.    Lab Results   Component Value Date    HGBA1C 5.8 (H) 01/29/2024     Hypertension  History of hypertension with current medication regimen of hydrochlorothiazide 25 mg daily. Most recent blood pressures shown below. denies headaches, changes in vision, chest pain, and shortness of breath.  Blood pressure at goal today.    BP Readings from Last 3 Encounters:   07/25/24 116/70   05/07/24 128/65   02/29/24 136/86      Dyslipidemia  History of dyslipidemia with current medication regimen of Lipitor 10 mg daily.  Did not report muscle cramping or aches. Most recent lipid panel shown below.     Lab Results   Component Value Date    CHOL 214 (H) 01/29/2024    CHOL 206 (H) 02/04/2020     Lab Results   Component Value Date    HDL 61 01/29/2024    HDL 46 02/04/2020     Lab Results   Component Value Date    LDLCALC 139.0 01/29/2024    LDLCALC 117.2 02/04/2020     No results found for: "DLDL"  Lab Results   Component Value Date    TRIG 70 01/29/2024    TRIG 214 (H) 02/04/2020       f1   Lab Results   Component Value Date    CHOLHDL 28.5 01/29/2024    CHOLHDL 22.3 " 02/04/2020      Obesity  She has a history of obesity with current weight of 220 lb and BMI of 35.6.  Reports she was interested initiating Ozempic as above.  No history of medullary thyroid cancer or pancreatitis.  We did discuss side effects of this medication, including nausea, vomiting, constipation.    Neuropathy  She has a history of neuropathy.  She was previously on gabapentin, but did not tolerate this medication.  Unable to get pregabalin as this will not be authorized through her insurance without another trial of gabapentin.  We have completed a trial of Elavil without improvement.  I had previously had her on Cymbalta, and she reported that she restarted this because it works better for her nerve pain.  Also feels that it improves her mood.  Requesting refill of his medication today.    Swelling of lower extremities  She reported some bilateral lower extremity swelling.  She has been taking Lasix 20 mg as needed.  We also discussed increasing physical activity, weight loss, wearing compression stockings, and keeping her legs elevated as much as possible.  She reported that she will work on this as well.    Vitals:    07/25/24 0827   BP: 116/70   Temp: 97.6 °F (36.4 °C)      Wt Readings from Last 3 Encounters:   07/25/24 0827 100.2 kg (220 lb 12.8 oz)   05/07/24 1556 99.8 kg (220 lb)   02/29/24 0903 96.1 kg (211 lb 12.8 oz)      Review of Systems    Review of Systems otherwise negative unless specified above.        Objective     Physical Exam  Vitals reviewed.   Constitutional:       General: She is not in acute distress.     Appearance: Normal appearance. She is obese.   HENT:      Head: Normocephalic and atraumatic.      Nose: Nose normal.      Mouth/Throat:      Mouth: Mucous membranes are moist.   Cardiovascular:      Rate and Rhythm: Normal rate and regular rhythm.      Heart sounds: No murmur heard.  Pulmonary:      Effort: Pulmonary effort is normal. No respiratory distress.      Breath sounds:  Normal breath sounds.   Musculoskeletal:         General: Normal range of motion.   Skin:     General: Skin is warm and dry.   Neurological:      General: No focal deficit present.      Mental Status: She is alert and oriented to person, place, and time.   Psychiatric:         Mood and Affect: Mood normal.         Behavior: Behavior normal.            Assessment and Plan     Current Outpatient Medications   Medication Instructions    atorvastatin (LIPITOR) 10 mg, Oral, Daily    diclofenac (CATAFLAM) 50 mg, Oral, 2 times daily    DULoxetine (CYMBALTA) 30 mg, Oral, Daily    ergocalciferol (VITAMIN D2) 50,000 Units, Oral, Every 7 days    esomeprazole (NEXIUM) 20 mg, Oral, Before breakfast    furosemide (LASIX) 20 mg, Oral, Daily PRN    hydroCHLOROthiazide (HYDRODIURIL) 25 mg, Oral, Daily    metFORMIN (GLUCOPHAGE) 500 mg, Oral, With breakfast    mupirocin (BACTROBAN) 2 % ointment Topical (Top), 3 times daily    semaglutide, weight loss, 0.25 mg/0.5 mL PnIj Inject 0.25 mg into the skin every 7 days for 30 days, THEN 0.5 mg every 7 days.        1. Prediabetes  -     Hemoglobin A1C; Future  -     Renal Function Panel; Future  -     semaglutide, weight loss, 0.25 mg/0.5 mL PnIj; Inject 0.25 mg into the skin every 7 days for 30 days, THEN 0.5 mg every 7 days.  Dispense: 10 mL; Refill: 0  -     metFORMIN (GLUCOPHAGE) 500 MG tablet; Take 1 tablet (500 mg total) by mouth daily with breakfast.  Dispense: 90 tablet; Refill: 1    2. Primary hypertension  -     hydroCHLOROthiazide (HYDRODIURIL) 25 MG tablet; Take 1 tablet (25 mg total) by mouth once daily.  Dispense: 90 tablet; Refill: 1    3. Dyslipidemia  -     Lipid Panel; Future  -     semaglutide, weight loss, 0.25 mg/0.5 mL PnIj; Inject 0.25 mg into the skin every 7 days for 30 days, THEN 0.5 mg every 7 days.  Dispense: 10 mL; Refill: 0  -     atorvastatin (LIPITOR) 10 MG tablet; Take 1 tablet (10 mg total) by mouth once daily.  Dispense: 90 tablet; Refill: 1    4. Severe  obesity (BMI 35.0-39.9) with comorbidity    5. Primary osteoarthritis involving multiple joints  -     DULoxetine (CYMBALTA) 30 MG capsule; Take 1 capsule (30 mg total) by mouth once daily.  Dispense: 90 capsule; Refill: 1    6. Swelling of lower extremity  -     furosemide (LASIX) 20 MG tablet; Take 1 tablet (20 mg total) by mouth daily as needed (swelling).  Dispense: 30 tablet; Refill: 1  -     hydroCHLOROthiazide (HYDRODIURIL) 25 MG tablet; Take 1 tablet (25 mg total) by mouth once daily.  Dispense: 90 tablet; Refill: 1        We will continue chronic medications as above.  We will start a trial of Ozempic per her request for weight loss and prediabetes.  We will otherwise plan to have her follow up in 3 months for monitoring her weight and further titration if needed.    Visit today included increased complexity associated with the care of the episodic problem prediabetes, hypertension, dyslipidemia addressed and managing the longitudinal care of the patient due to the serious and/or complex managed problem(s) prediabetes, hypertension, dyslipidemia.         Follow up in about 3 months (around 10/25/2024) for Follow up with Nikita.    Questions were invited and answered. No other acute concerns at this time. Will plan to follow up as above or sooner if needed.     Radha Shelton, DO  07/25/2024 9:19 AM       This dictation has been generated using Modal Fluency Dictation. Some phonetic errors may occur. Please contact author for clarification if needed.

## 2024-08-25 ENCOUNTER — PATIENT MESSAGE (OUTPATIENT)
Dept: FAMILY MEDICINE | Facility: CLINIC | Age: 64
End: 2024-08-25
Payer: COMMERCIAL

## 2024-08-25 DIAGNOSIS — E78.5 DYSLIPIDEMIA: ICD-10-CM

## 2024-08-25 DIAGNOSIS — R73.03 PREDIABETES: ICD-10-CM

## 2024-08-25 NOTE — TELEPHONE ENCOUNTER
No care due was identified.  [facility] Embedded Care Due Messages. Reference number: [id number].   8/25/2024 2:12:48 PM CDT

## 2024-08-26 ENCOUNTER — TELEPHONE (OUTPATIENT)
Dept: FAMILY MEDICINE | Facility: CLINIC | Age: 64
End: 2024-08-26
Payer: COMMERCIAL

## 2024-08-26 DIAGNOSIS — E66.01 SEVERE OBESITY (BMI 35.0-39.9) WITH COMORBIDITY: Primary | ICD-10-CM

## 2024-08-26 DIAGNOSIS — R73.03 PREDIABETES: ICD-10-CM

## 2024-08-26 RX ORDER — SEMAGLUTIDE 0.5 MG/.5ML
0.5 INJECTION, SOLUTION SUBCUTANEOUS
Qty: 2 ML | Refills: 2 | Status: SHIPPED | OUTPATIENT
Start: 2024-08-26 | End: 2024-11-24

## 2024-08-26 NOTE — TELEPHONE ENCOUNTER
Sherry Shelton,  Please review this message below from [facility].  State the Rx was change to Ozempic and patient do not want to change from [facility].  Please review.  Thank you.  Signed:  Kojo Gilmore LPN  ----- Message from Perla Rodriguez sent at 8/26/2024  9:49 AM CDT -----  Contact: [facility]  Type:  Patient Returning Call    Who Called:  [facility]  Who Left Message for Patient:  Dr. Shelton  Does the patient know what this is regarding?:  yes  Best Call Back Number:  [phone]  Additional Information:

## 2024-08-26 NOTE — TELEPHONE ENCOUNTER
Pharmacy contacted and medication updated as requested.    Radha Shelton DO  08/26/2024 9:39 AM

## 2024-08-26 NOTE — TELEPHONE ENCOUNTER
Sherry Shelton,  Please review message below from [facility] due to requesting clarity on the directions for the Semaglutide 0.25 mg/0.5 ml  DirSig - Route: Inject 0.5 mg into the skin every 7 days. - Subcutaneous injections reads  Please review and advise.  Thank you.  Signed:  Kojo Gilmore LPN  ----- Message from Marcos Ramos sent at 8/26/2024  8:42 AM CDT -----  Regarding: pharmacy  Contact: [facility]  Type:  Pharmacy Calling to Clarify an RX    Name of Caller:  [facility] - [place], [place] - [address]  [address] [place] [address]  Phone: [phone] Fax: [phone]  Pharmacy Name:  Prescription Name:semaglutide, weight loss, 0.25 mg/0.5 mL PnIj  What do they need to clarify?:directions  Best Call Back Number:  Additional Information:

## 2024-09-26 ENCOUNTER — PATIENT MESSAGE (OUTPATIENT)
Dept: FAMILY MEDICINE | Facility: CLINIC | Age: 64
End: 2024-09-26
Payer: COMMERCIAL

## 2024-09-26 DIAGNOSIS — R73.03 PREDIABETES: ICD-10-CM

## 2024-09-26 DIAGNOSIS — E78.5 DYSLIPIDEMIA: ICD-10-CM

## 2024-09-26 DIAGNOSIS — E66.01 SEVERE OBESITY (BMI 35.0-39.9) WITH COMORBIDITY: Primary | ICD-10-CM

## 2024-09-30 RX ORDER — TIRZEPATIDE 7.5 MG/.5ML
7.5 INJECTION, SOLUTION SUBCUTANEOUS
Qty: 2 ML | Refills: 2 | Status: SHIPPED | OUTPATIENT
Start: 2024-09-30 | End: 2024-12-29

## 2024-10-30 ENCOUNTER — OFFICE VISIT (OUTPATIENT)
Dept: FAMILY MEDICINE | Facility: CLINIC | Age: 64
End: 2024-10-30
Payer: COMMERCIAL

## 2024-10-30 ENCOUNTER — PATIENT MESSAGE (OUTPATIENT)
Dept: FAMILY MEDICINE | Facility: CLINIC | Age: 64
End: 2024-10-30

## 2024-10-30 VITALS
SYSTOLIC BLOOD PRESSURE: 124 MMHG | OXYGEN SATURATION: 98 % | HEIGHT: 66 IN | HEART RATE: 85 BPM | WEIGHT: 199.31 LBS | BODY MASS INDEX: 32.03 KG/M2 | DIASTOLIC BLOOD PRESSURE: 62 MMHG

## 2024-10-30 DIAGNOSIS — G62.9 NEUROPATHY: ICD-10-CM

## 2024-10-30 DIAGNOSIS — Z23 NEED FOR INFLUENZA VACCINATION: ICD-10-CM

## 2024-10-30 DIAGNOSIS — K43.9 VENTRAL HERNIA WITHOUT OBSTRUCTION OR GANGRENE: ICD-10-CM

## 2024-10-30 DIAGNOSIS — R73.03 PREDIABETES: ICD-10-CM

## 2024-10-30 DIAGNOSIS — F41.1 GAD (GENERALIZED ANXIETY DISORDER): ICD-10-CM

## 2024-10-30 DIAGNOSIS — E66.811 CLASS 1 OBESITY DUE TO EXCESS CALORIES WITH SERIOUS COMORBIDITY AND BODY MASS INDEX (BMI) OF 32.0 TO 32.9 IN ADULT: Primary | ICD-10-CM

## 2024-10-30 DIAGNOSIS — E66.09 CLASS 1 OBESITY DUE TO EXCESS CALORIES WITH SERIOUS COMORBIDITY AND BODY MASS INDEX (BMI) OF 32.0 TO 32.9 IN ADULT: Primary | ICD-10-CM

## 2024-10-30 PROCEDURE — 3074F SYST BP LT 130 MM HG: CPT | Mod: CPTII,S$GLB,, | Performed by: STUDENT IN AN ORGANIZED HEALTH CARE EDUCATION/TRAINING PROGRAM

## 2024-10-30 PROCEDURE — 1160F RVW MEDS BY RX/DR IN RCRD: CPT | Mod: CPTII,S$GLB,, | Performed by: STUDENT IN AN ORGANIZED HEALTH CARE EDUCATION/TRAINING PROGRAM

## 2024-10-30 PROCEDURE — 99214 OFFICE O/P EST MOD 30 MIN: CPT | Mod: 25,S$GLB,, | Performed by: STUDENT IN AN ORGANIZED HEALTH CARE EDUCATION/TRAINING PROGRAM

## 2024-10-30 PROCEDURE — 99499 UNLISTED E&M SERVICE: CPT | Mod: S$GLB,,, | Performed by: STUDENT IN AN ORGANIZED HEALTH CARE EDUCATION/TRAINING PROGRAM

## 2024-10-30 PROCEDURE — 3008F BODY MASS INDEX DOCD: CPT | Mod: CPTII,S$GLB,, | Performed by: STUDENT IN AN ORGANIZED HEALTH CARE EDUCATION/TRAINING PROGRAM

## 2024-10-30 PROCEDURE — 3044F HG A1C LEVEL LT 7.0%: CPT | Mod: CPTII,S$GLB,, | Performed by: STUDENT IN AN ORGANIZED HEALTH CARE EDUCATION/TRAINING PROGRAM

## 2024-10-30 PROCEDURE — 90656 IIV3 VACC NO PRSV 0.5 ML IM: CPT | Mod: S$GLB,,, | Performed by: STUDENT IN AN ORGANIZED HEALTH CARE EDUCATION/TRAINING PROGRAM

## 2024-10-30 PROCEDURE — 1159F MED LIST DOCD IN RCRD: CPT | Mod: CPTII,S$GLB,, | Performed by: STUDENT IN AN ORGANIZED HEALTH CARE EDUCATION/TRAINING PROGRAM

## 2024-10-30 PROCEDURE — 3078F DIAST BP <80 MM HG: CPT | Mod: CPTII,S$GLB,, | Performed by: STUDENT IN AN ORGANIZED HEALTH CARE EDUCATION/TRAINING PROGRAM

## 2024-10-30 PROCEDURE — 90471 IMMUNIZATION ADMIN: CPT | Mod: S$GLB,,, | Performed by: STUDENT IN AN ORGANIZED HEALTH CARE EDUCATION/TRAINING PROGRAM

## 2024-10-30 RX ORDER — DULOXETIN HYDROCHLORIDE 60 MG/1
60 CAPSULE, DELAYED RELEASE ORAL DAILY
Qty: 90 CAPSULE | Refills: 1 | Status: SHIPPED | OUTPATIENT
Start: 2024-10-30 | End: 2025-10-30

## 2024-11-05 ENCOUNTER — OFFICE VISIT (OUTPATIENT)
Facility: CLINIC | Age: 64
End: 2024-11-05
Payer: COMMERCIAL

## 2024-11-05 VITALS
DIASTOLIC BLOOD PRESSURE: 78 MMHG | HEIGHT: 66 IN | SYSTOLIC BLOOD PRESSURE: 124 MMHG | WEIGHT: 199.31 LBS | BODY MASS INDEX: 32.03 KG/M2

## 2024-11-05 DIAGNOSIS — K43.9 VENTRAL HERNIA WITHOUT OBSTRUCTION OR GANGRENE: Primary | ICD-10-CM

## 2024-11-05 PROCEDURE — 1160F RVW MEDS BY RX/DR IN RCRD: CPT | Mod: CPTII,S$GLB,, | Performed by: SPECIALIST

## 2024-11-05 PROCEDURE — 99204 OFFICE O/P NEW MOD 45 MIN: CPT | Mod: S$GLB,,, | Performed by: SPECIALIST

## 2024-11-05 PROCEDURE — 3074F SYST BP LT 130 MM HG: CPT | Mod: CPTII,S$GLB,, | Performed by: SPECIALIST

## 2024-11-05 PROCEDURE — 3008F BODY MASS INDEX DOCD: CPT | Mod: CPTII,S$GLB,, | Performed by: SPECIALIST

## 2024-11-05 PROCEDURE — 3078F DIAST BP <80 MM HG: CPT | Mod: CPTII,S$GLB,, | Performed by: SPECIALIST

## 2024-11-05 PROCEDURE — 1159F MED LIST DOCD IN RCRD: CPT | Mod: CPTII,S$GLB,, | Performed by: SPECIALIST

## 2024-11-05 PROCEDURE — 3044F HG A1C LEVEL LT 7.0%: CPT | Mod: CPTII,S$GLB,, | Performed by: SPECIALIST

## 2024-11-05 RX ORDER — SODIUM CHLORIDE 9 MG/ML
INJECTION, SOLUTION INTRAVENOUS CONTINUOUS
OUTPATIENT
Start: 2024-11-05
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
## 2024-11-05 NOTE — PATIENT INSTRUCTIONS
Pre-operative Instructions     Date of procedure:  December 4, 2024      Do not take the following Medications for 3 days prior to your procedure:   Aspirin, Excedrin, BC powder or goodies powders   Ibuprofen or Motrin  Naproxen or Aleve  Fish oils  Vitamin E    Pre-OP Instructions  On the night of 12/03/24 Scrub the surgical area with Hibiclens for several minutes  On the morning of 12/04/24 scrub the surgical area with Hibiclens for several minutes  Do not shave or clip hair at the site of your surgery  Do not wear jewelry to the hospital.  You will have to remove it.  Leave at home so that it is not lost.    Food/Drink   Do not eat or drink after Midnight     Location of Department:   [facility] [address], [place] [address]    Parking:    Use parking lot 1  Enter at the main hospital entrance  Check in with outpatient registration    Contact:   Someone from surgery will call you with a time of arrival.   If you surgery is on Monday, someone will contact you on Friday.  If you do not receive a call from surgery by 2pm the business day (12/3/2024) before your procedure, please call [phone].     Medications:   You may take your blood pressure/thyroid/seizure medications with a small sip of water. Take all other morning medications after the procedure        Transportation:   You will NOT be able to drive yourself.  You are required to have someone drive you home from the hospital due to the anesthesia.          Miralax Bowel Prep Instructions       Bowel Prep Instructions:  You will need to purchase at the store the following from the pharmacy:  One (1) box laxative tablets, (NOT STOOL SOFTNERS)  8.3 oz bottle of Miralax (or generic)   Two (2) quarts (64oz) of liquid to drink. We suggest Gatorade or Powerade.  Do not follow packaging instructions on either of these items  Mix the 2 qts of liquid and the 8.3 oz bottle of Miralax together to make your prep drink.      Do  not take the following Medications for 3 days prior to your procedure:   Aspirin, Excedrin, BC powder or goodies powders   Ibuprofen or Motrin  Naproxen or Aleve  Fish oils  Vitamin E      Day 1 December 3, 2024   All day you will be on a Clear Liquid Diet   You may have the following chicken, beef or bone broth, Jell-O, Popsicles, Sprite, Water, Gatorade, Powerade, and Black Coffee.   Do not eat or drink anything RED OR PURPLE IN COLOR     At Noon 12:00 pm, you will take two laxative tablets.      At 2:00 pm, you will drink half of your Miralax prep Drink     At 6:00 pm, you will drink the remaining half of your prep drink and two laxative tablets.      Do not eat or drink after Midnight                     Post-operative Instructions      RESTRICTIONS:   During your procedure today, you received medications for sedation.     These medications may affect your judgement, balance, and coordination.     DO NOT drive a car, operate machinery, make legal/financial decisions, sign important papers or drink alcohol on day of procedure.     ACTIVITY:   After your surgery you CANNOT LIFT anything over 10 pounds, no pushing, no pulling, no bending or no strenuous exercise UNTIL released by doctor.  Do not bath until your sutures or staples are removed.  You may take a shower. Keep surgical areas clean and dry, re-bandage areas as needed.     DIET AND MEDICATIONS    May eat and drink normally unless instructed otherwise.     Continue present medications unless otherwise instructed     TREATMENT FOR COMMON SIDE EFFECTS:   Pain medication is prescribed to be taken as needed, NOT on a schedule. Use pain medication for periods of high activity and before sleep. Providers have limits on amounts of medications that they may prescribe. Use pain medication properly to assure availability.     Because air was used during your procedure you may experience mild abdominal pain, nausea, belching, bloating or excessive gas: walking, rest,  eat lightly and use a heating pad to help alleviate discomfort.     Sore throat: treat with throat lozenges and/or gargle with warm salt water.     If a bowel prep was taken, you may not have a bowel movement for 1-3 days. This is normal.     IF YOU HAVE ANY OF THE SYMPTOMS BELOW, REPORT TO YOUR PHYSICIAN:     1. Excessive or unexpected pain in back or abdomen     2. Signs of infection such as: chills or fever occurring within 24 hours after the procedure.     3. Rectal bleeding, which would show as bright red, maroon, or black stools. (A tablespoon of blood from the rectum is not serious, especially if hemorrhoids are present.)     4. Vomiting     5. Weakness or dizziness.     IF YOU EXPERIENCE ANY OF THE FOLLOWING, GO DIRECTLY TO THE NEAREST EMERGENCY ROOM:  1. Difficulty breathing     2. Chills and/or fever over 101 F     3. Persistent vomiting and/or vomiting blood     4. Severe abdominal pain     5. Chest pain     6. Black, tarry stools     7. Bleeding-more than one tablespoon     8. Any other symptoms or condition that you feel may need urgent attention.       YOUR DOCTOR RECOMMENDS THESE ADDITIONAL INSTRUCTIONS:     1. If any biopsies were taken, your results will be discussed at your follow up appointment     2. Further recommendations will depend on how you are recovering from you procedure

## 2024-11-06 ENCOUNTER — TELEPHONE (OUTPATIENT)
Facility: CLINIC | Age: 64
End: 2024-11-06
Payer: COMMERCIAL

## 2024-11-06 NOTE — TELEPHONE ENCOUNTER
Writer spoke to pt and informed pt per surgical Nurse Param. Pt can take weight loss injection up to 11/25/24. Pt needs to hold weightloss injection on 12/02/24. This was stated to pt multiple times in conversation. Pt expressed understanding and agreed with plan of care. Pt informed to call us if she needed anything.

## 2024-11-19 ENCOUNTER — ANESTHESIA EVENT (OUTPATIENT)
Dept: SURGERY | Facility: HOSPITAL | Age: 64
End: 2024-11-19
Payer: COMMERCIAL

## 2024-11-19 ENCOUNTER — PATIENT MESSAGE (OUTPATIENT)
Facility: CLINIC | Age: 64
End: 2024-11-19
Payer: COMMERCIAL

## 2024-11-19 DIAGNOSIS — M79.89 SWELLING OF LOWER EXTREMITY: ICD-10-CM

## 2024-11-19 RX ORDER — FUROSEMIDE 20 MG/1
20 TABLET ORAL DAILY PRN
Qty: 30 TABLET | Refills: 1 | Status: SHIPPED | OUTPATIENT
Start: 2024-11-19 | End: 2025-11-19

## 2024-11-19 NOTE — TELEPHONE ENCOUNTER
Care Due:                  Date            Visit Type   Department     Provider  --------------------------------------------------------------------------------                                EP -                              PRIMARY      [facility] FAMILY  Last Visit: 10-      CARE ([facility])   FRANKIE Shelton                              EP -                              PRIMARY      [facility] FAMILY  Next Visit: 01-      CARE ([facility])   [facility]       Radha Shelton                                                            Last  Test          Frequency    Reason                     Performed    Due Date  --------------------------------------------------------------------------------    CMP.........  12 months..  atorvastatin.............  01- 01-    HBA1C.......  6 months...  metFORMIN, tirzepatide,..  07- 01-    Vitamin D...  12 months..  ergocalciferol...........  Not Found    Overdue    [employer] Embedded Care Due Messages. Reference number: [id number].   11/19/2024 7:31:52 AM CST

## 2024-11-19 NOTE — ANESTHESIA PREPROCEDURE EVALUATION
2024  Sharri House is a 64 y.o., female.   has a past surgical history that includes Hysterectomy;  section; Hip surgery (Left); Abdominal surgery; Colonoscopy (N/A, 2020); Injection of anesthetic agent around medial branch nerves innervating lumbar facet joint (Bilateral, 2020); and Epidural steroid injection (N/A, 2020).       Pre-op Assessment    I have reviewed the Patient Summary Reports.     I have reviewed the Nursing Notes. I have reviewed the NPO Status.   I have reviewed the Medications.     Review of Systems  Anesthesia Hx:  No problems with previous Anesthesia             Denies Family Hx of Anesthesia complications.    Denies Personal Hx of Anesthesia complications.                    Social:  Former Smoker       Hematology/Oncology:  Hematology Normal   Oncology Normal                                   EENT/Dental:  EENT/Dental Normal           Cardiovascular:     Hypertension              ECG has been reviewed.  ECG- NSR   stress echo- Normal study. There are no significant perfusion defects on rest or stress.    Volumes    Rest:  EF: 77%.     Stress:  EF: 68%.                               Pulmonary:  Pulmonary Normal                       Renal/:  Renal/ Normal                 Hepatic/GI:       Hepatitis, C History of Hep C- treated             Musculoskeletal:  Arthritis               Neurological:    Neuromuscular Disease,       Chronic low back pain with right sided sciatica      Chronic Pain Syndrome                         Endocrine:   Hypothyroidism  Pre DM      Obesity / BMI > 30  Psych:  Psychiatric History anxiety               Physical Exam  General: Well nourished, Cooperative, Alert and Oriented    Airway:  Mallampati: II   Mouth Opening: Normal  TM Distance: Normal  Tongue: Normal  Neck ROM: Normal  ROM    Dental:  Intact    Chest/Lungs:  Clear to auscultation, Normal Respiratory Rate    Heart:  Rate: Normal  Rhythm: Regular Rhythm    Anesthesia Plan  Type of Anesthesia, risks & benefits discussed:    Anesthesia Type: Gen ETT  Intra-op Monitoring Plan: Standard ASA Monitors  Post Op Pain Control Plan: IV/PO Opioids PRN  Induction:  IV  Airway Plan: Video  Informed Consent: Informed consent signed with the Patient and all parties understand the risks and agree with anesthesia plan.  All questions answered.   ASA Score: 2  Day of Surgery Review of History & Physical: H&P Update referred to the surgeon/provider.    Ready For Surgery From Anesthesia Perspective.   .

## 2024-12-02 ENCOUNTER — HOSPITAL ENCOUNTER (OUTPATIENT)
Dept: PREADMISSION TESTING | Facility: HOSPITAL | Age: 64
Discharge: HOME | End: 2024-12-02
Attending: SPECIALIST
Payer: COMMERCIAL

## 2024-12-02 VITALS
BODY MASS INDEX: 32.03 KG/M2 | WEIGHT: 199.31 LBS | HEART RATE: 72 BPM | SYSTOLIC BLOOD PRESSURE: 123 MMHG | TEMPERATURE: 97 F | RESPIRATION RATE: 16 BRPM | HEIGHT: 66 IN | OXYGEN SATURATION: 99 % | DIASTOLIC BLOOD PRESSURE: 62 MMHG

## 2024-12-02 PROCEDURE — 99900103 DSU ONLY-NO CHARGE-INITIAL HR (STAT)

## 2024-12-04 ENCOUNTER — ANESTHESIA (OUTPATIENT)
Dept: SURGERY | Facility: HOSPITAL | Age: 64
End: 2024-12-04
Payer: COMMERCIAL

## 2024-12-04 ENCOUNTER — PATIENT MESSAGE (OUTPATIENT)
Dept: SURGERY | Facility: HOSPITAL | Age: 64
End: 2024-12-04
Payer: COMMERCIAL

## 2024-12-04 ENCOUNTER — HOSPITAL ENCOUNTER (OUTPATIENT)
Facility: HOSPITAL | Age: 64
Discharge: HOME | End: 2024-12-04
Attending: SPECIALIST | Admitting: SPECIALIST
Payer: COMMERCIAL

## 2024-12-04 DIAGNOSIS — K43.9 VENTRAL HERNIA WITHOUT OBSTRUCTION OR GANGRENE: Primary | ICD-10-CM

## 2024-12-04 PROCEDURE — 63600175 PHARM REV CODE 636 W HCPCS: Performed by: NURSE ANESTHETIST, CERTIFIED REGISTERED

## 2024-12-04 PROCEDURE — C1781 MESH (IMPLANTABLE): HCPCS | Performed by: SPECIALIST

## 2024-12-04 PROCEDURE — 37000008 HC ANESTHESIA 1ST 15 MINUTES: Performed by: SPECIALIST

## 2024-12-04 PROCEDURE — 71000015 HC POSTOP RECOV 1ST HR: Performed by: SPECIALIST

## 2024-12-04 PROCEDURE — 27201423 OPTIME MED/SURG SUP & DEVICES STERILE SUPPLY: Performed by: SPECIALIST

## 2024-12-04 PROCEDURE — 36000708 HC OR TIME LEV III 1ST 15 MIN: Performed by: SPECIALIST

## 2024-12-04 PROCEDURE — 71000039 HC RECOVERY, EACH ADD'L HOUR: Performed by: SPECIALIST

## 2024-12-04 PROCEDURE — 36000709 HC OR TIME LEV III EA ADD 15 MIN: Performed by: SPECIALIST

## 2024-12-04 PROCEDURE — 49596 RPR AA HRN 1ST > 10 NCR/STRN: CPT | Mod: ,,, | Performed by: SPECIALIST

## 2024-12-04 PROCEDURE — 63600175 PHARM REV CODE 636 W HCPCS: Performed by: SPECIALIST

## 2024-12-04 PROCEDURE — 25000003 PHARM REV CODE 250: Performed by: SPECIALIST

## 2024-12-04 PROCEDURE — 37000009 HC ANESTHESIA EA ADD 15 MINS: Performed by: SPECIALIST

## 2024-12-04 PROCEDURE — 25000003 PHARM REV CODE 250: Performed by: NURSE ANESTHETIST, CERTIFIED REGISTERED

## 2024-12-04 PROCEDURE — 71000033 HC RECOVERY, INTIAL HOUR: Performed by: SPECIALIST

## 2024-12-04 DEVICE — COMPOSITE MESH,MONOFILAMENT POLYESTER WITH ABSORBABLE COLLAGEN FILM AND MARKING
Type: IMPLANTABLE DEVICE | Site: ABDOMEN | Status: FUNCTIONAL
Brand: SYMBOTEX

## 2024-12-04 RX ORDER — LIDOCAINE HYDROCHLORIDE 20 MG/ML
INJECTION INTRAVENOUS
Status: DISCONTINUED | OUTPATIENT
Start: 2024-12-04 | End: 2024-12-04

## 2024-12-04 RX ORDER — MIDAZOLAM HYDROCHLORIDE 1 MG/ML
INJECTION INTRAMUSCULAR; INTRAVENOUS
Status: DISCONTINUED | OUTPATIENT
Start: 2024-12-04 | End: 2024-12-04

## 2024-12-04 RX ORDER — CEFAZOLIN 2 G/1
2 INJECTION, POWDER, FOR SOLUTION INTRAMUSCULAR; INTRAVENOUS
Status: COMPLETED | OUTPATIENT
Start: 2024-12-04 | End: 2024-12-04

## 2024-12-04 RX ORDER — GLUCAGON 1 MG
1 KIT INJECTION
Status: DISCONTINUED | OUTPATIENT
Start: 2024-12-04 | End: 2024-12-04 | Stop reason: HOSPADM

## 2024-12-04 RX ORDER — HYDROMORPHONE HYDROCHLORIDE 2 MG/ML
0.5 INJECTION, SOLUTION INTRAMUSCULAR; INTRAVENOUS; SUBCUTANEOUS EVERY 5 MIN PRN
Status: DISCONTINUED | OUTPATIENT
Start: 2024-12-04 | End: 2024-12-04 | Stop reason: HOSPADM

## 2024-12-04 RX ORDER — ONDANSETRON HYDROCHLORIDE 2 MG/ML
4 INJECTION, SOLUTION INTRAVENOUS DAILY PRN
Status: DISCONTINUED | OUTPATIENT
Start: 2024-12-04 | End: 2024-12-04 | Stop reason: HOSPADM

## 2024-12-04 RX ORDER — ROCURONIUM BROMIDE 10 MG/ML
INJECTION, SOLUTION INTRAVENOUS
Status: DISCONTINUED | OUTPATIENT
Start: 2024-12-04 | End: 2024-12-04

## 2024-12-04 RX ORDER — SODIUM CHLORIDE, SODIUM LACTATE, POTASSIUM CHLORIDE, CALCIUM CHLORIDE 600; 310; 30; 20 MG/100ML; MG/100ML; MG/100ML; MG/100ML
125 INJECTION, SOLUTION INTRAVENOUS CONTINUOUS
Status: DISCONTINUED | OUTPATIENT
Start: 2024-12-04 | End: 2024-12-04 | Stop reason: HOSPADM

## 2024-12-04 RX ORDER — LIDOCAINE HYDROCHLORIDE 10 MG/ML
1 INJECTION, SOLUTION EPIDURAL; INFILTRATION; INTRACAUDAL; PERINEURAL ONCE
Status: DISCONTINUED | OUTPATIENT
Start: 2024-12-04 | End: 2024-12-04 | Stop reason: HOSPADM

## 2024-12-04 RX ORDER — PROPOFOL 10 MG/ML
VIAL (ML) INTRAVENOUS
Status: DISCONTINUED | OUTPATIENT
Start: 2024-12-04 | End: 2024-12-04

## 2024-12-04 RX ORDER — DEXAMETHASONE SODIUM PHOSPHATE 4 MG/ML
INJECTION, SOLUTION INTRA-ARTICULAR; INTRALESIONAL; INTRAMUSCULAR; INTRAVENOUS; SOFT TISSUE
Status: DISCONTINUED | OUTPATIENT
Start: 2024-12-04 | End: 2024-12-04

## 2024-12-04 RX ORDER — DIPHENHYDRAMINE HYDROCHLORIDE 50 MG/ML
INJECTION INTRAMUSCULAR; INTRAVENOUS
Status: DISCONTINUED | OUTPATIENT
Start: 2024-12-04 | End: 2024-12-04

## 2024-12-04 RX ORDER — SODIUM CHLORIDE, SODIUM LACTATE, POTASSIUM CHLORIDE, CALCIUM CHLORIDE 600; 310; 30; 20 MG/100ML; MG/100ML; MG/100ML; MG/100ML
INJECTION, SOLUTION INTRAVENOUS CONTINUOUS
Status: DISCONTINUED | OUTPATIENT
Start: 2024-12-04 | End: 2024-12-04 | Stop reason: HOSPADM

## 2024-12-04 RX ORDER — SODIUM CHLORIDE 9 MG/ML
INJECTION, SOLUTION INTRAVENOUS CONTINUOUS
Status: DISCONTINUED | OUTPATIENT
Start: 2024-12-04 | End: 2024-12-04 | Stop reason: HOSPADM

## 2024-12-04 RX ORDER — MEPERIDINE HYDROCHLORIDE 50 MG/ML
12.5 INJECTION INTRAMUSCULAR; INTRAVENOUS; SUBCUTANEOUS EVERY 10 MIN PRN
Status: DISCONTINUED | OUTPATIENT
Start: 2024-12-04 | End: 2024-12-04 | Stop reason: HOSPADM

## 2024-12-04 RX ORDER — OXYCODONE AND ACETAMINOPHEN 5; 325 MG/1; MG/1
1 TABLET ORAL EVERY 4 HOURS PRN
Qty: 30 TABLET | Refills: 0 | Status: SHIPPED | OUTPATIENT
Start: 2024-12-04

## 2024-12-04 RX ORDER — HYDROMORPHONE HYDROCHLORIDE 2 MG/ML
INJECTION, SOLUTION INTRAMUSCULAR; INTRAVENOUS; SUBCUTANEOUS
Status: DISCONTINUED | OUTPATIENT
Start: 2024-12-04 | End: 2024-12-04

## 2024-12-04 RX ORDER — ONDANSETRON HYDROCHLORIDE 2 MG/ML
INJECTION, SOLUTION INTRAVENOUS
Status: DISCONTINUED | OUTPATIENT
Start: 2024-12-04 | End: 2024-12-04

## 2024-12-04 RX ORDER — OXYCODONE HYDROCHLORIDE 5 MG/1
5 TABLET ORAL ONCE AS NEEDED
Status: COMPLETED | OUTPATIENT
Start: 2024-12-04 | End: 2024-12-04

## 2024-12-04 RX ORDER — FENTANYL CITRATE 50 UG/ML
INJECTION, SOLUTION INTRAMUSCULAR; INTRAVENOUS
Status: DISCONTINUED | OUTPATIENT
Start: 2024-12-04 | End: 2024-12-04

## 2024-12-04 RX ADMIN — SUGAMMADEX 200 MG: 100 INJECTION, SOLUTION INTRAVENOUS at 11:12

## 2024-12-04 RX ADMIN — ONDANSETRON 4 MG: 2 INJECTION INTRAMUSCULAR; INTRAVENOUS at 11:12

## 2024-12-04 RX ADMIN — FENTANYL CITRATE 100 MCG: 50 INJECTION INTRAMUSCULAR; INTRAVENOUS at 10:12

## 2024-12-04 RX ADMIN — MIDAZOLAM HYDROCHLORIDE 2 MG: 1 INJECTION, SOLUTION INTRAMUSCULAR; INTRAVENOUS at 09:12

## 2024-12-04 RX ADMIN — HYDROMORPHONE HYDROCHLORIDE 1 MG: 2 INJECTION INTRAMUSCULAR; INTRAVENOUS; SUBCUTANEOUS at 11:12

## 2024-12-04 RX ADMIN — CEFAZOLIN 2 G: 2 INJECTION, POWDER, FOR SOLUTION INTRAMUSCULAR; INTRAVENOUS at 10:12

## 2024-12-04 RX ADMIN — DIPHENHYDRAMINE HYDROCHLORIDE 25 MG: 50 INJECTION INTRAMUSCULAR; INTRAVENOUS at 10:12

## 2024-12-04 RX ADMIN — SODIUM CHLORIDE, POTASSIUM CHLORIDE, SODIUM LACTATE AND CALCIUM CHLORIDE: 600; 310; 30; 20 INJECTION, SOLUTION INTRAVENOUS at 08:12

## 2024-12-04 RX ADMIN — ROCURONIUM BROMIDE 10 MG: 10 INJECTION, SOLUTION INTRAVENOUS at 11:12

## 2024-12-04 RX ADMIN — DEXAMETHASONE SODIUM PHOSPHATE 8 MG: 4 INJECTION, SOLUTION INTRAMUSCULAR; INTRAVENOUS at 10:12

## 2024-12-04 RX ADMIN — ROCURONIUM BROMIDE 50 MG: 10 INJECTION, SOLUTION INTRAVENOUS at 10:12

## 2024-12-04 RX ADMIN — HYDROMORPHONE HYDROCHLORIDE 0.5 MG: 2 INJECTION INTRAMUSCULAR; INTRAVENOUS; SUBCUTANEOUS at 12:12

## 2024-12-04 RX ADMIN — LIDOCAINE HYDROCHLORIDE 50 MG: 20 INJECTION, SOLUTION INTRAVENOUS at 10:12

## 2024-12-04 RX ADMIN — PROPOFOL 120 MG: 10 INJECTION, EMULSION INTRAVENOUS at 10:12

## 2024-12-04 RX ADMIN — OXYCODONE HYDROCHLORIDE 5 MG: 5 TABLET ORAL at 01:12

## 2024-12-04 NOTE — DISCHARGE SUMMARY
[facility] Surgery  Discharge Note  Short Stay    Procedure(s) (LRB):  REPAIR, HERNIA, INCISIONAL, LAPAROSCOPIC (N/A)      OUTCOME: Patient tolerated treatment/procedure well without complication and is now ready for discharge.    DISPOSITION: Home or Self Care    FINAL DIAGNOSIS:  Multiple polyps multiple incarcerated incisional hernias.  Significant adhesions requiring lysis of adhesions    FOLLOWUP: In clinic in 14 days    DISCHARGE INSTRUCTIONS:  No heavy lifting     Clinical Reference Documents Added to Patient Instructions         Document    ABDOMINAL HERNIA REPAIR, LAPAROSCOPIC SURGERY (ENGLISH)            TIME SPENT ON DISCHARGE:  10 minutes

## 2024-12-04 NOTE — TRANSFER OF CARE
"Anesthesia Transfer of Care Note    Patient: Sharri House    Procedure(s) Performed: Procedure(s) (LRB):  REPAIR, HERNIA, INCISIONAL, LAPAROSCOPIC (N/A)    Patient location: PACU    Anesthesia Type: general    Transport from OR: Transported from OR on room air with adequate spontaneous ventilation    Post pain: adequate analgesia    Post assessment: no apparent anesthetic complications and tolerated procedure well    Post vital signs: stable    Level of consciousness: awake, alert and oriented    Nausea/Vomiting: no nausea/vomiting    Complications: none    Transfer of care protocol was followed      Last vitals: Visit Vitals  /62 (Patient Position: Lying)   Pulse 73   Temp 36.6 °C (97.9 °F) (Temporal)   Resp 16   Ht 5' 6" (1.676 m)   Wt 90 kg (198 lb 6.6 oz)   SpO2 97%   Breastfeeding No   BMI 32.02 kg/m²     "

## 2024-12-04 NOTE — ANESTHESIA PROCEDURE NOTES
Intubation    Date/Time: 12/4/2024 10:12 AM    Performed by: Patricia Marti CRNA  Authorized by: rBandt Henry MD    Intubation:     Induction:  Intravenous    Intubated:  Postinduction    Mask Ventilation:  Easy mask    Attempts:  1    Attempted By:  CRNA    Method of Intubation:  Video laryngoscopy    Blade:  Ochoa 3    Laryngeal View Grade: Grade I - full view of cords      Difficult Airway Encountered?: No      Complications:  None    Airway Device:  Oral endotracheal tube    Airway Device Size:  7.0    Style/Cuff Inflation:  Cuffed (inflated to minimal occlusive pressure)    Tube secured:  21    Secured at:  The lips    Placement Verified By:  Capnometry    Complicating Factors:  None    Findings Post-Intubation:  BS equal bilateral and atraumatic/condition of teeth unchanged

## 2024-12-04 NOTE — OP NOTE
Patient: Sharri House     Date of Procedure: 12/4/2024    Procedure:  Laparoscopic incarcerated ventral hernia repair with lysis of dense intra-abdominal adhesions from previous operations    Surgeon: Ar Teague MD    Assistant: None    Pre-op Diagnosis:  Multiple incarcerated Ventral hernia without obstruction or gangrene [K43.9]     Post-op Diagnosis:  Multiple Ventral hernia without obstruction or gangrene [K43.9], severe dense adhesions necessitating increased length of operation and marked increased degree of difficulty    Procedure in Detail:  After informed consent was obtained, consent form signed, and questions answered, the surgical site was identified and marked appropriately.  The patient was then taken to the operating room where general endotracheal anesthesia was induced. Prophylactic IV antibiotics were administered.  The patient was positioned and the surgical field was prepped and draped in the usual sterile fashion. A thorough time-out procedure was performed with the surgical team.    Using an Optiview technique and 11 mm mm trocar was placed in the left subcostal region laterally without complication.  Abdomen was insufflated to 15 mm of mercury with CO2.  Under direct visualization 2 separate 5 mm trocar was placed in the left anterior axillary line.  No visceral injuries were noted.  On general inspection of the peritoneal cavity there were dense adhesions along the midline.  These were and from the falciform ligament inferiorly.  All adhesions were taken down using blunt dissection and harmonic scalpel.  Multiple incarcerated hernia defects were identified in the abdominal wall along the incision line.  Had the appearance of Swiss cheese.  This in was encountered this at the apex of the incision all the way to the umbilical region were 2 large defects 1 on each side of the umbilical region was identified.  Total length of all incarcerated hernias was a proximally 13 cm.  There were several  small Swiss cheese defects inferior to this.  After all adhesions were taken down the bowel was run and there was no evidence of an enterotomy that was appreciated.  Secondary to this Covidien Symbotex mesh was placed in the peritoneal cavity.  Measured approximately 15 cm x 20 cm.  It was anchored with the rough side towards the peritoneal lining smooth side towards bowel with 2 separate rows of a sober tacks placed 2 cm apart in 2 separate circumferential linear lines this is circumferentially.  There was excellent overlay over the defects and the largest defects were centered within the mesh with a proximally 4-5 cm overlying peripherally around the defects on each side.  Hemostasis was noted in the peritoneal cavity.  The abdomen was generally explored bowel was re-evaluated there was no evidence of enterotomies.  Peritoneal cavity was deflated under direct visualization the trocar was removed during this process at the end.  Skin was closed with staples at all sites.  The end the procedure all counts were correct x2 and patient was brought to the recovery room, extubated in hemodynamically stable condition.    Dressings were applied and the patient was awakened and transferred to the recovery room in stable condition. There were no immediate complications.    Specimen:  None    EBL:  Less than 10 cc    Complications: None    This note was created using [employer] direct voice recognition software. Note may have occasional typographical errors that may not have been identified and edited despite initial review prior to signing.

## 2024-12-04 NOTE — PLAN OF CARE
1147 Received patient from procedure room.  Report received, placed patient on monitors, VSS.  Will continue to monitor patient per protocol until discharge.

## 2024-12-04 NOTE — ANESTHESIA POSTPROCEDURE EVALUATION
Anesthesia Post Evaluation    Patient: Sharri House    Procedure(s) Performed: Procedure(s) (LRB):  REPAIR, HERNIA, INCISIONAL, LAPAROSCOPIC (N/A)    Final Anesthesia Type: general      Patient location during evaluation: PACU  Patient participation: Yes- Able to Participate  Level of consciousness: awake and alert and oriented  Post-procedure vital signs: reviewed and stable  Pain management: adequate  Airway patency: patent    PONV status at discharge: No PONV  Anesthetic complications: no      Cardiovascular status: blood pressure returned to baseline and hemodynamically stable  Respiratory status: spontaneous ventilation and room air  Hydration status: euvolemic  Follow-up not needed.          Vitals Value Taken Time   /58 12/04/24 1301   Temp 36.2 °C (97.1 °F) 12/04/24 1147   Pulse 85 12/04/24 1312   Resp 11 12/04/24 1312   SpO2 95 % 12/04/24 1312   Vitals shown include unfiled device data.      No case tracking events are documented in the log.      Pain/Vicente Score: Pain Rating Prior to Med Admin: 8 (12/4/2024 12:22 PM)  Pain Rating Post Med Admin: 2 (12/4/2024 12:35 PM)  Vicente Score: 8 (12/4/2024  1:00 PM)

## 2024-12-04 NOTE — PLAN OF CARE
Discharge instructions given to patient and her son, both verbalized understanding and voiced no questions or concerns at this time. IV discontinued, and patient changing for discharge.

## 2024-12-05 ENCOUNTER — PATIENT MESSAGE (OUTPATIENT)
Facility: CLINIC | Age: 64
End: 2024-12-05
Payer: COMMERCIAL

## 2024-12-05 NOTE — TELEPHONE ENCOUNTER
Writer spoke to pt, per Dr. Teague pt notified that she could take her zepbound medication today. Pt informed to eat lightly for the next couple of days. Pt asked about brace. Pt informed per Dr. Teague to wear abdominal binder till the post op visit. Pt expressed understanding.

## 2024-12-09 VITALS
SYSTOLIC BLOOD PRESSURE: 97 MMHG | BODY MASS INDEX: 31.89 KG/M2 | OXYGEN SATURATION: 92 % | WEIGHT: 198.44 LBS | HEIGHT: 66 IN | DIASTOLIC BLOOD PRESSURE: 55 MMHG | RESPIRATION RATE: 7 BRPM | TEMPERATURE: 97 F | HEART RATE: 84 BPM

## 2024-12-17 ENCOUNTER — OFFICE VISIT (OUTPATIENT)
Facility: CLINIC | Age: 64
End: 2024-12-17
Payer: COMMERCIAL

## 2024-12-17 VITALS
HEIGHT: 66 IN | SYSTOLIC BLOOD PRESSURE: 122 MMHG | WEIGHT: 198.44 LBS | DIASTOLIC BLOOD PRESSURE: 66 MMHG | BODY MASS INDEX: 31.89 KG/M2

## 2024-12-17 DIAGNOSIS — K43.9 VENTRAL HERNIA WITHOUT OBSTRUCTION OR GANGRENE: Primary | ICD-10-CM

## 2024-12-17 PROCEDURE — 99212 OFFICE O/P EST SF 10 MIN: CPT | Mod: S$GLB,,, | Performed by: SPECIALIST

## 2024-12-17 PROCEDURE — 3044F HG A1C LEVEL LT 7.0%: CPT | Mod: CPTII,S$GLB,, | Performed by: SPECIALIST

## 2024-12-17 PROCEDURE — 3074F SYST BP LT 130 MM HG: CPT | Mod: CPTII,S$GLB,, | Performed by: SPECIALIST

## 2024-12-17 PROCEDURE — 3078F DIAST BP <80 MM HG: CPT | Mod: CPTII,S$GLB,, | Performed by: SPECIALIST

## 2024-12-17 PROCEDURE — 1159F MED LIST DOCD IN RCRD: CPT | Mod: CPTII,S$GLB,, | Performed by: SPECIALIST

## 2024-12-17 PROCEDURE — 1160F RVW MEDS BY RX/DR IN RCRD: CPT | Mod: CPTII,S$GLB,, | Performed by: SPECIALIST

## 2024-12-17 NOTE — LETTER
December 17, 2024      [facility] - General Surgery  [address], [address]  [place] [address]  Phone: [phone]  Fax: [phone]       Patient: Sharri House   YOB: 1960  Date of Visit: 12/17/2024    To Whom It May Concern:    Jenifer House  was at [facility] on 12/17/2024. The patient may return to work/school on 1/13/2025. If you have any questions or concerns, or if I can be of further assistance, please do not hesitate to contact me.    Sincerely,    Letty Barber MA

## 2024-12-17 NOTE — PROGRESS NOTES
"Patient is a 64 y.o. female who presents for postoperative evaluation following  repair complex incisional hernias    Chief Complaint   Patient presents with    Post-op Evaluation        Vitals:    12/17/24 1325   BP: 122/66   Weight: 90 kg (198 lb 6.6 oz)   Height: 5' 6" (1.676 m)        Subjective      Physical Exam     Incision clean dry and intact    Drains none    Pathology none    Assessment & Plan     Doing well status post repair of multiple ventral hernias laparoscopic  Recommendations remove staples    No orders of the defined types were placed in this encounter.       Follow-up p.r.vero.    Ar Teague MD  General Surgery  [address] [address]      Patient instructed that best way to communicate with my office staff is for patient to get on the [facility] patient portal to expedite communication and communication issues that may occur.  Patient was given instructions on how to get on the portal.  I encouraged patient to obtain portal access as well.  Ultimately it is up to the patient to obtain access.  Patient voiced understanding.    "

## 2025-01-07 ENCOUNTER — OFFICE VISIT (OUTPATIENT)
Facility: CLINIC | Age: 65
End: 2025-01-07
Payer: COMMERCIAL

## 2025-01-07 ENCOUNTER — OFFICE VISIT (OUTPATIENT)
Dept: FAMILY MEDICINE | Facility: CLINIC | Age: 65
End: 2025-01-07
Payer: COMMERCIAL

## 2025-01-07 VITALS
WEIGHT: 183.63 LBS | BODY MASS INDEX: 29.51 KG/M2 | HEIGHT: 66 IN | SYSTOLIC BLOOD PRESSURE: 110 MMHG | DIASTOLIC BLOOD PRESSURE: 62 MMHG

## 2025-01-07 VITALS
HEIGHT: 66 IN | SYSTOLIC BLOOD PRESSURE: 108 MMHG | HEART RATE: 81 BPM | OXYGEN SATURATION: 98 % | DIASTOLIC BLOOD PRESSURE: 70 MMHG | BODY MASS INDEX: 29.51 KG/M2 | WEIGHT: 183.63 LBS

## 2025-01-07 DIAGNOSIS — G89.29 CHRONIC LEFT SHOULDER PAIN: Primary | ICD-10-CM

## 2025-01-07 DIAGNOSIS — E66.3 OVERWEIGHT (BMI 25.0-29.9): ICD-10-CM

## 2025-01-07 DIAGNOSIS — Z12.31 ENCOUNTER FOR SCREENING MAMMOGRAM FOR MALIGNANT NEOPLASM OF BREAST: ICD-10-CM

## 2025-01-07 DIAGNOSIS — M25.512 CHRONIC LEFT SHOULDER PAIN: Primary | ICD-10-CM

## 2025-01-07 DIAGNOSIS — K43.9 VENTRAL HERNIA WITHOUT OBSTRUCTION OR GANGRENE: ICD-10-CM

## 2025-01-07 PROCEDURE — 1159F MED LIST DOCD IN RCRD: CPT | Mod: CPTII,S$GLB,, | Performed by: SPECIALIST

## 2025-01-07 PROCEDURE — 99213 OFFICE O/P EST LOW 20 MIN: CPT | Mod: S$GLB,,, | Performed by: SPECIALIST

## 2025-01-07 PROCEDURE — 1160F RVW MEDS BY RX/DR IN RCRD: CPT | Mod: CPTII,S$GLB,, | Performed by: SPECIALIST

## 2025-01-07 PROCEDURE — 3074F SYST BP LT 130 MM HG: CPT | Mod: CPTII,S$GLB,, | Performed by: SPECIALIST

## 2025-01-07 PROCEDURE — 3078F DIAST BP <80 MM HG: CPT | Mod: CPTII,S$GLB,, | Performed by: STUDENT IN AN ORGANIZED HEALTH CARE EDUCATION/TRAINING PROGRAM

## 2025-01-07 PROCEDURE — 3074F SYST BP LT 130 MM HG: CPT | Mod: CPTII,S$GLB,, | Performed by: STUDENT IN AN ORGANIZED HEALTH CARE EDUCATION/TRAINING PROGRAM

## 2025-01-07 PROCEDURE — 3078F DIAST BP <80 MM HG: CPT | Mod: CPTII,S$GLB,, | Performed by: SPECIALIST

## 2025-01-07 PROCEDURE — 99214 OFFICE O/P EST MOD 30 MIN: CPT | Mod: S$GLB,,, | Performed by: STUDENT IN AN ORGANIZED HEALTH CARE EDUCATION/TRAINING PROGRAM

## 2025-01-07 PROCEDURE — 3008F BODY MASS INDEX DOCD: CPT | Mod: CPTII,S$GLB,, | Performed by: STUDENT IN AN ORGANIZED HEALTH CARE EDUCATION/TRAINING PROGRAM

## 2025-01-07 PROCEDURE — G2211 COMPLEX E/M VISIT ADD ON: HCPCS | Mod: S$GLB,,, | Performed by: STUDENT IN AN ORGANIZED HEALTH CARE EDUCATION/TRAINING PROGRAM

## 2025-01-07 NOTE — PROGRESS NOTES
Patient is a 64 y.o. female who presents for postoperative evaluation following  laparoscopic repair of complex ventral hernia.  Patient with complaints of new bulges in the hernia repair site.  Is losing weight but is having discomfort at the hernia repair site    No chief complaint on file.       There were no vitals filed for this visit.     Subjective      Physical Exam     Incision clean dry and intact    Drains none    Pathology none    Assessment & Plan     Patient with complaints of new bulge is at a hernia repair site.  No evidence of bulging on Valsalva or coughing but consolidation of the hernia sac fluid collection.  Recommendations CT scan abdomen and pelvis to evaluate these areas    No orders of the defined types were placed in this encounter.       Follow-up return to clinic following CT scan    Ar Teague MD  General Surgery  [address]   [place] [address]      Patient instructed that best way to communicate with my office staff is for patient to get on the [facility] patient portal to expedite communication and communication issues that may occur.  Patient was given instructions on how to get on the portal.  I encouraged patient to obtain portal access as well.  Ultimately it is up to the patient to obtain access.  Patient voiced understanding.

## 2025-01-07 NOTE — PROGRESS NOTES
[facility]  Primary Care Clinic - [place], [place]    Family Medicine Office Visit    Subjective     Patient ID: Sharri House is a 64 y.o. female who presents to clinic today to follow up.     Medical history, surgical history, medications, allergies, and social history were reviewed and updated.     Chief Complaint: Shoulder Pain (Left shoulder x 1 year)    HPI    Overweight  She has a history of obesity and she is currently taking Wegovy for this.  She started this in July of 2024 and her weight at that time was 220 lb.  Weight today is 183 lb with BMI of 29.6.  Congratulated her on her weight loss.  Her goal is to be at 160 lb.  Recommended that she continue working on diet and exercise.    Left shoulder pain  Had a left shoulder about 9 months ago. She was handed something behind  her and she felt something rip or tug from that shoulder. Reports tightness and tenderness over her anterior shoulder. Had no imaging or evaluation completed at the time of injury. Reported that her pain came and went for several months, but it was exacerbated after her hernia repair.     Vitals:    01/07/25 1327   BP: 108/70   Pulse: 81      Wt Readings from Last 3 Encounters:   01/07/25 1427 83.3 kg (183 lb 10.3 oz)   01/07/25 1327 83.3 kg (183 lb 9.6 oz)   12/17/24 1325 90 kg (198 lb 6.6 oz)      Review of Systems    Review of Systems otherwise negative unless specified above.        Objective     Physical Exam  Vitals reviewed.   Constitutional:       General: She is not in acute distress.     Appearance: Normal appearance.   HENT:      Head: Normocephalic and atraumatic.      Nose: Nose normal.      Mouth/Throat:      Mouth: Mucous membranes are moist.   Cardiovascular:      Rate and Rhythm: Normal rate and regular rhythm.      Heart sounds: No murmur heard.  Pulmonary:      Effort: Pulmonary effort is normal. No respiratory distress.      Breath sounds: Normal breath sounds.   Musculoskeletal:      Left shoulder:  Tenderness present. No deformity or bony tenderness. Decreased range of motion.   Skin:     General: Skin is warm and dry.   Neurological:      General: No focal deficit present.      Mental Status: She is alert and oriented to person, place, and time.   Psychiatric:         Mood and Affect: Mood normal.         Behavior: Behavior normal.     Dictation #1  MRN:[id number]  CSN:[id number]        Assessment and Plan     Current Outpatient Medications   Medication Instructions    DULoxetine (CYMBALTA) 60 mg, Oral, Daily    ergocalciferol (VITAMIN D2) 50,000 Units, Oral, Every 7 days    esomeprazole (NEXIUM) 20 mg, Before breakfast    furosemide (LASIX) 20 mg, Oral, Daily PRN    metFORMIN (GLUCOPHAGE) 500 mg, Oral, With breakfast    mupirocin (BACTROBAN) 2 % ointment Topical (Top), 3 times daily    oxyCODONE-acetaminophen (PERCOCET) 5-325 mg per tablet 1 tablet, Oral, Every 4 hours PRN        1. Chronic left shoulder pain  -     X-Ray Shoulder 2 or More Views Left; Future; Expected date: 01/07/2025  -     Ambulatory referral/consult to Orthopedics; Future; Expected date: 01/14/2025    2. Overweight (BMI 25.0-29.9)    3. Encounter for screening mammogram for malignant neoplasm of breast  -     Mammo Digital Screening Bilat w/ Fabricio; Future; Expected date: 03/11/2025        We will obtain x-ray of her left shoulder for further evaluation.  I am concerned that she may have a rotator cuff tear.  Also placing referral to local orthopedics per her request.  She is interested in getting injection and further evaluation for further management.  Congratulated her on her weight loss.  Recommend that she continue working on diet and exercise.  We will also schedule mammogram for her to complete in March of this year.  We will otherwise plan to keep scheduled follow up with me at the end of this month.    Visit today included increased complexity associated with the care of the episodic problem overweight, left shoulder pain addressed  and managing the longitudinal care of the patient due to the serious and/or complex managed problem(s) overweight, left shoulder pain.         Follow up if symptoms worsen or fail to improve, for Keep scheduled follow up with Nikita this month.    Questions were invited and answered. No other acute concerns at this time. Will plan to follow up as above or sooner if needed.     Radha Shelton DO  01/07/2025 1:34 PM       This dictation has been generated using [employer] Dictation. Some phonetic errors may occur. Please contact author for clarification if needed.

## 2025-01-08 ENCOUNTER — PATIENT MESSAGE (OUTPATIENT)
Dept: FAMILY MEDICINE | Facility: CLINIC | Age: 65
End: 2025-01-08
Payer: COMMERCIAL

## 2025-01-08 ENCOUNTER — PATIENT MESSAGE (OUTPATIENT)
Facility: CLINIC | Age: 65
End: 2025-01-08
Payer: COMMERCIAL

## 2025-01-08 ENCOUNTER — HOSPITAL ENCOUNTER (OUTPATIENT)
Dept: RADIOLOGY | Facility: HOSPITAL | Age: 65
Discharge: HOME | End: 2025-01-08
Attending: STUDENT IN AN ORGANIZED HEALTH CARE EDUCATION/TRAINING PROGRAM
Payer: COMMERCIAL

## 2025-01-08 DIAGNOSIS — M25.512 CHRONIC LEFT SHOULDER PAIN: ICD-10-CM

## 2025-01-08 DIAGNOSIS — M25.512 CHRONIC LEFT SHOULDER PAIN: Primary | ICD-10-CM

## 2025-01-08 DIAGNOSIS — G89.29 CHRONIC LEFT SHOULDER PAIN: ICD-10-CM

## 2025-01-08 DIAGNOSIS — G89.29 CHRONIC LEFT SHOULDER PAIN: Primary | ICD-10-CM

## 2025-01-08 PROCEDURE — 73030 X-RAY EXAM OF SHOULDER: CPT | Mod: 26,LT,, | Performed by: RADIOLOGY

## 2025-01-08 PROCEDURE — 73030 X-RAY EXAM OF SHOULDER: CPT | Mod: TC,LT

## 2025-01-08 RX ORDER — MELOXICAM 7.5 MG/1
7.5 TABLET ORAL DAILY PRN
Qty: 30 TABLET | Refills: 1 | Status: SHIPPED | OUTPATIENT
Start: 2025-01-08

## 2025-01-08 RX ORDER — METHOCARBAMOL 750 MG/1
750 TABLET, FILM COATED ORAL 2 TIMES DAILY PRN
Qty: 60 TABLET | Refills: 1 | Status: SHIPPED | OUTPATIENT
Start: 2025-01-08

## 2025-01-16 ENCOUNTER — HOSPITAL ENCOUNTER (OUTPATIENT)
Dept: RADIOLOGY | Facility: HOSPITAL | Age: 65
Discharge: HOME | End: 2025-01-16
Attending: SPECIALIST
Payer: COMMERCIAL

## 2025-01-16 DIAGNOSIS — K43.9 VENTRAL HERNIA WITHOUT OBSTRUCTION OR GANGRENE: ICD-10-CM

## 2025-01-16 PROCEDURE — 74176 CT ABD & PELVIS W/O CONTRAST: CPT | Mod: TC

## 2025-01-16 PROCEDURE — 25500020 PHARM REV CODE 255: Performed by: SPECIALIST

## 2025-01-16 PROCEDURE — A9698 NON-RAD CONTRAST MATERIALNOC: HCPCS | Performed by: SPECIALIST

## 2025-01-16 PROCEDURE — 74176 CT ABD & PELVIS W/O CONTRAST: CPT | Mod: 26,,, | Performed by: RADIOLOGY

## 2025-01-16 RX ADMIN — IOHEXOL 1000 ML: 12 SOLUTION ORAL at 02:01

## 2025-01-28 ENCOUNTER — OFFICE VISIT (OUTPATIENT)
Facility: CLINIC | Age: 65
End: 2025-01-28
Payer: COMMERCIAL

## 2025-01-28 VITALS
HEART RATE: 78 BPM | DIASTOLIC BLOOD PRESSURE: 68 MMHG | OXYGEN SATURATION: 98 % | HEIGHT: 66 IN | SYSTOLIC BLOOD PRESSURE: 118 MMHG | WEIGHT: 186 LBS | BODY MASS INDEX: 29.89 KG/M2

## 2025-01-28 DIAGNOSIS — K43.9 VENTRAL HERNIA WITHOUT OBSTRUCTION OR GANGRENE: Primary | ICD-10-CM

## 2025-01-28 PROCEDURE — 99213 OFFICE O/P EST LOW 20 MIN: CPT | Mod: S$GLB,,, | Performed by: SPECIALIST

## 2025-01-28 PROCEDURE — 1160F RVW MEDS BY RX/DR IN RCRD: CPT | Mod: CPTII,S$GLB,, | Performed by: SPECIALIST

## 2025-01-28 PROCEDURE — 3078F DIAST BP <80 MM HG: CPT | Mod: CPTII,S$GLB,, | Performed by: SPECIALIST

## 2025-01-28 PROCEDURE — 3074F SYST BP LT 130 MM HG: CPT | Mod: CPTII,S$GLB,, | Performed by: SPECIALIST

## 2025-01-28 PROCEDURE — 3008F BODY MASS INDEX DOCD: CPT | Mod: CPTII,S$GLB,, | Performed by: SPECIALIST

## 2025-01-28 PROCEDURE — 1159F MED LIST DOCD IN RCRD: CPT | Mod: CPTII,S$GLB,, | Performed by: SPECIALIST
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
## 2025-01-30 ENCOUNTER — LAB VISIT (OUTPATIENT)
Dept: LAB | Facility: CLINIC | Age: 65
End: 2025-01-30
Payer: COMMERCIAL

## 2025-01-30 ENCOUNTER — OFFICE VISIT (OUTPATIENT)
Dept: FAMILY MEDICINE | Facility: CLINIC | Age: 65
End: 2025-01-30
Payer: COMMERCIAL

## 2025-01-30 VITALS
HEIGHT: 66 IN | BODY MASS INDEX: 29.73 KG/M2 | WEIGHT: 185 LBS | HEART RATE: 74 BPM | DIASTOLIC BLOOD PRESSURE: 60 MMHG | SYSTOLIC BLOOD PRESSURE: 110 MMHG | OXYGEN SATURATION: 97 %

## 2025-01-30 DIAGNOSIS — E66.3 OVERWEIGHT (BMI 25.0-29.9): Primary | ICD-10-CM

## 2025-01-30 DIAGNOSIS — F41.1 GAD (GENERALIZED ANXIETY DISORDER): ICD-10-CM

## 2025-01-30 DIAGNOSIS — G89.29 CHRONIC LEFT SHOULDER PAIN: ICD-10-CM

## 2025-01-30 DIAGNOSIS — M79.89 SWELLING OF LOWER EXTREMITY: ICD-10-CM

## 2025-01-30 DIAGNOSIS — Z79.899 HIGH RISK MEDICATION USE: ICD-10-CM

## 2025-01-30 DIAGNOSIS — M25.512 CHRONIC LEFT SHOULDER PAIN: ICD-10-CM

## 2025-01-30 DIAGNOSIS — R73.03 PREDIABETES: ICD-10-CM

## 2025-01-30 DIAGNOSIS — G62.9 NEUROPATHY: ICD-10-CM

## 2025-01-30 DIAGNOSIS — Z13.220 SCREENING FOR LIPID DISORDERS: ICD-10-CM

## 2025-01-30 DIAGNOSIS — E55.9 VITAMIN D DEFICIENCY: ICD-10-CM

## 2025-01-30 LAB
ALBUMIN SERPL BCP-MCNC: 3.7 G/DL (ref 3.5–5.2)
ALP SERPL-CCNC: 54 U/L (ref 40–150)
ALT SERPL W/O P-5'-P-CCNC: 13 U/L (ref 10–44)
ANION GAP SERPL CALC-SCNC: 11 MMOL/L (ref 8–16)
AST SERPL-CCNC: 13 U/L (ref 10–40)
BASOPHILS # BLD AUTO: 0.09 K/UL (ref 0–0.2)
BASOPHILS NFR BLD: 1.6 % (ref 0–1.9)
BILIRUB SERPL-MCNC: 0.6 MG/DL (ref 0.1–1)
BUN SERPL-MCNC: 15 MG/DL (ref 8–23)
CALCIUM SERPL-MCNC: 9.3 MG/DL (ref 8.7–10.5)
CHLORIDE SERPL-SCNC: 100 MMOL/L (ref 95–110)
CHOLEST SERPL-MCNC: 245 MG/DL (ref 120–199)
CHOLEST/HDLC SERPL: 4.3 {RATIO} (ref 2–5)
CO2 SERPL-SCNC: 25 MMOL/L (ref 23–29)
CREAT SERPL-MCNC: 0.8 MG/DL (ref 0.5–1.4)
DIFFERENTIAL METHOD BLD: NORMAL
EOSINOPHIL # BLD AUTO: 0.1 K/UL (ref 0–0.5)
EOSINOPHIL NFR BLD: 2.3 % (ref 0–8)
ERYTHROCYTE [DISTWIDTH] IN BLOOD BY AUTOMATED COUNT: 12.8 % (ref 11.5–14.5)
EST. GFR  (NO RACE VARIABLE): >60 ML/MIN/1.73 M^2
ESTIMATED AVG GLUCOSE: 111 MG/DL (ref 68–131)
GLUCOSE SERPL-MCNC: 92 MG/DL (ref 70–110)
HBA1C MFR BLD: 5.5 % (ref 4–5.6)
HCT VFR BLD AUTO: 40.5 % (ref 37–48.5)
HDLC SERPL-MCNC: 57 MG/DL (ref 40–75)
HDLC SERPL: 23.3 % (ref 20–50)
HGB BLD-MCNC: 13.1 G/DL (ref 12–16)
IMM GRANULOCYTES # BLD AUTO: 0.01 K/UL (ref 0–0.04)
IMM GRANULOCYTES NFR BLD AUTO: 0.2 % (ref 0–0.5)
LDLC SERPL CALC-MCNC: 167.2 MG/DL (ref 63–159)
LYMPHOCYTES # BLD AUTO: 2 K/UL (ref 1–4.8)
LYMPHOCYTES NFR BLD: 35.5 % (ref 18–48)
MCH RBC QN AUTO: 27.4 PG (ref 27–31)
MCHC RBC AUTO-ENTMCNC: 32.3 G/DL (ref 32–36)
MCV RBC AUTO: 85 FL (ref 82–98)
MONOCYTES # BLD AUTO: 0.4 K/UL (ref 0.3–1)
MONOCYTES NFR BLD: 7.8 % (ref 4–15)
NEUTROPHILS # BLD AUTO: 3 K/UL (ref 1.8–7.7)
NEUTROPHILS NFR BLD: 52.6 % (ref 38–73)
NONHDLC SERPL-MCNC: 188 MG/DL
NRBC BLD-RTO: 0 /100 WBC
PLATELET # BLD AUTO: 291 K/UL (ref 150–450)
PMV BLD AUTO: 10.6 FL (ref 9.2–12.9)
POTASSIUM SERPL-SCNC: 4 MMOL/L (ref 3.5–5.1)
PROT SERPL-MCNC: 6.9 G/DL (ref 6–8.4)
RBC # BLD AUTO: 4.78 M/UL (ref 4–5.4)
SODIUM SERPL-SCNC: 136 MMOL/L (ref 136–145)
TRIGL SERPL-MCNC: 104 MG/DL (ref 30–150)
WBC # BLD AUTO: 5.64 K/UL (ref 3.9–12.7)

## 2025-01-30 PROCEDURE — 80053 COMPREHEN METABOLIC PANEL: CPT | Performed by: STUDENT IN AN ORGANIZED HEALTH CARE EDUCATION/TRAINING PROGRAM

## 2025-01-30 PROCEDURE — 36415 COLL VENOUS BLD VENIPUNCTURE: CPT | Mod: ,,, | Performed by: STUDENT IN AN ORGANIZED HEALTH CARE EDUCATION/TRAINING PROGRAM

## 2025-01-30 PROCEDURE — 80061 LIPID PANEL: CPT | Performed by: STUDENT IN AN ORGANIZED HEALTH CARE EDUCATION/TRAINING PROGRAM

## 2025-01-30 PROCEDURE — 85025 COMPLETE CBC W/AUTO DIFF WBC: CPT | Performed by: STUDENT IN AN ORGANIZED HEALTH CARE EDUCATION/TRAINING PROGRAM

## 2025-01-30 PROCEDURE — 83036 HEMOGLOBIN GLYCOSYLATED A1C: CPT | Performed by: STUDENT IN AN ORGANIZED HEALTH CARE EDUCATION/TRAINING PROGRAM

## 2025-01-30 RX ORDER — METFORMIN HYDROCHLORIDE 500 MG/1
500 TABLET ORAL
Qty: 90 TABLET | Refills: 1 | Status: SHIPPED | OUTPATIENT
Start: 2025-01-30 | End: 2025-01-30

## 2025-01-30 RX ORDER — MELOXICAM 7.5 MG/1
7.5 TABLET ORAL DAILY PRN
Qty: 30 TABLET | Refills: 2 | Status: SHIPPED | OUTPATIENT
Start: 2025-01-30

## 2025-01-30 RX ORDER — TIRZEPATIDE 10 MG/.5ML
10 INJECTION, SOLUTION SUBCUTANEOUS
Qty: 2 ML | Refills: 2 | Status: SHIPPED | OUTPATIENT
Start: 2025-01-30 | End: 2025-04-30

## 2025-01-30 RX ORDER — DULOXETIN HYDROCHLORIDE 60 MG/1
60 CAPSULE, DELAYED RELEASE ORAL DAILY
Qty: 90 CAPSULE | Refills: 1 | Status: SHIPPED | OUTPATIENT
Start: 2025-01-30 | End: 2026-01-30

## 2025-01-30 RX ORDER — ERGOCALCIFEROL 1.25 MG/1
50000 CAPSULE ORAL
Qty: 12 CAPSULE | Refills: 3 | Status: SHIPPED | OUTPATIENT
Start: 2025-01-30 | End: 2026-01-30

## 2025-01-30 RX ORDER — TIRZEPATIDE 7.5 MG/.5ML
7.5 INJECTION, SOLUTION SUBCUTANEOUS WEEKLY
COMMUNITY
Start: 2025-01-14 | End: 2025-01-30

## 2025-01-30 RX ORDER — FUROSEMIDE 20 MG/1
20 TABLET ORAL DAILY PRN
Qty: 30 TABLET | Refills: 1 | Status: SHIPPED | OUTPATIENT
Start: 2025-01-30 | End: 2026-01-30

## 2025-01-30 NOTE — PROGRESS NOTES
[facility]  Primary Care Clinic - [place], [place]    Family Medicine Office Visit    Subjective     Patient ID: Sharri House is a 64 y.o. female who presents to clinic today to follow up.     Medical history, surgical history, medications, allergies, and social history were reviewed and updated.     Chief Complaint: Health Maintenance    HPI    History of being overweight and prediabetes.  Current medication regimen includes that bound 7.5 mg weekly.  She is also taking metformin.  She would like to discontinue this medication as she has lost a good amount of weight and plans to continue this at bound for now.  She is paying for this out of pocket as her insurance will not cover it.  She would like to increase her that bound to 10 mg weekly.  No adverse side effects reported.    She has a history of generalized anxiety disorder and neuropathy.  She also has some chronic shoulder pain.  This is currently managed with Cymbalta 60 mg daily and Mobic 7.5 mg daily as needed.  She reported that this regimen is currently controlling her pain well and would like to continue her medications.    She has a history of intermittent lower extremity swelling and she currently takes Lasix 20 mg daily as needed.  Reported this has working well for her and keeping her symptoms under control.    She also has a history of vitamin-D deficiency.  Currently taking vitamin-D weekly supplement without issue.    Vitals:    01/30/25 1304   BP: 110/60   Pulse: 74      Wt Readings from Last 3 Encounters:   01/30/25 1304 83.9 kg (185 lb)   01/28/25 1352 84.4 kg (186 lb)   01/07/25 1427 83.3 kg (183 lb 10.3 oz)      Review of Systems    Review of Systems otherwise negative unless specified above.        Objective     Physical Exam  Vitals reviewed.   Constitutional:       General: She is not in acute distress.     Appearance: Normal appearance.   HENT:      Head: Normocephalic and atraumatic.      Nose: Nose normal.       Mouth/Throat:      Mouth: Mucous membranes are moist.   Cardiovascular:      Rate and Rhythm: Normal rate and regular rhythm.      Heart sounds: No murmur heard.  Pulmonary:      Effort: Pulmonary effort is normal. No respiratory distress.      Breath sounds: Normal breath sounds.   Musculoskeletal:         General: Normal range of motion.   Skin:     General: Skin is warm and dry.   Neurological:      General: No focal deficit present.      Mental Status: She is alert and oriented to person, place, and time.   Psychiatric:         Mood and Affect: Mood normal.         Behavior: Behavior normal.            Assessment and Plan     Current Outpatient Medications   Medication Instructions    DULoxetine (CYMBALTA) 60 mg, Oral, Daily    ergocalciferol (VITAMIN D2) 50,000 Units, Oral, Every 7 days    esomeprazole (NEXIUM) 20 mg, Before breakfast    furosemide (LASIX) 20 mg, Oral, Daily PRN    meloxicam (MOBIC) 7.5 mg, Oral, Daily PRN    methocarbamoL (ROBAXIN) 750 mg, Oral, 2 times daily PRN    mupirocin (BACTROBAN) 2 % ointment Topical (Top), 3 times daily    ZEPBOUND 10 mg, Subcutaneous, Every 7 days        1. Overweight (BMI 25.0-29.9)  -     tirzepatide, weight loss, (ZEPBOUND) 10 mg/0.5 mL PnIj; Inject 10 mg into the skin every 7 days.  Dispense: 2 mL; Refill: 2    2. Prediabetes  -     Discontinue: metFORMIN (GLUCOPHAGE) 500 MG tablet; Take 1 tablet (500 mg total) by mouth daily with breakfast.  Dispense: 90 tablet; Refill: 1    3. BOB (generalized anxiety disorder)  -     DULoxetine (CYMBALTA) 60 MG capsule; Take 1 capsule (60 mg total) by mouth once daily.  Dispense: 90 capsule; Refill: 1    4. Neuropathy  -     DULoxetine (CYMBALTA) 60 MG capsule; Take 1 capsule (60 mg total) by mouth once daily.  Dispense: 90 capsule; Refill: 1    5. Chronic left shoulder pain  -     meloxicam (MOBIC) 7.5 MG tablet; Take 1 tablet (7.5 mg total) by mouth daily as needed for Pain.  Dispense: 30 tablet; Refill: 2    6. Swelling of  lower extremity  -     furosemide (LASIX) 20 MG tablet; Take 1 tablet (20 mg total) by mouth daily as needed (swelling).  Dispense: 30 tablet; Refill: 1    7. Vitamin D deficiency  -     ergocalciferol (VITAMIN D2) 50,000 unit Cap; Take 1 capsule (50,000 Units total) by mouth every 7 days.  Dispense: 12 capsule; Refill: 3    8. High risk medication use  -     Hemoglobin A1C; Future  -     Comprehensive Metabolic Panel; Future  -     CBC auto differential; Future; Expected date: 01/30/2025    9. Screening for lipid disorders  -     Lipid Panel; Future        Continuing current medication regimen and increasing her that bound to 10 mg weekly.  She is scheduled to complete her mammogram in March.  We will complete blood work today for monitoring.  We will otherwise plan to have her follow up in 3 months for weight check.    Visit today included increased complexity associated with the care of the episodic problem overweight, prediabetes, anxiety addressed and managing the longitudinal care of the patient due to the serious and/or complex managed problem(s) overweight, prediabetes, anxiety.         Follow up in about 3 months (around 4/30/2025) for Follow up with Nikita.    Questions were invited and answered. No other acute concerns at this time. Will plan to follow up as above or sooner if needed.     Radha Shelton, DO  01/30/2025 1:38 PM       This dictation has been generated using Modal Fluency Dictation. Some phonetic errors may occur. Please contact author for clarification if needed.

## 2025-01-31 ENCOUNTER — PATIENT MESSAGE (OUTPATIENT)
Dept: FAMILY MEDICINE | Facility: CLINIC | Age: 65
End: 2025-01-31
Payer: COMMERCIAL

## 2025-01-31 DIAGNOSIS — E78.5 DYSLIPIDEMIA: Primary | ICD-10-CM

## 2025-01-31 RX ORDER — ATORVASTATIN CALCIUM 10 MG/1
10 TABLET, FILM COATED ORAL DAILY
Qty: 90 TABLET | Refills: 3 | Status: SHIPPED | OUTPATIENT
Start: 2025-01-31 | End: 2026-01-31

## 2025-02-05 ENCOUNTER — PATIENT MESSAGE (OUTPATIENT)
Dept: FAMILY MEDICINE | Facility: CLINIC | Age: 65
End: 2025-02-05
Payer: COMMERCIAL

## 2025-03-07 ENCOUNTER — OFFICE VISIT (OUTPATIENT)
Dept: FAMILY MEDICINE | Facility: CLINIC | Age: 65
End: 2025-03-07
Payer: COMMERCIAL

## 2025-03-07 VITALS
HEIGHT: 66 IN | OXYGEN SATURATION: 99 % | BODY MASS INDEX: 27.78 KG/M2 | DIASTOLIC BLOOD PRESSURE: 62 MMHG | HEART RATE: 83 BPM | WEIGHT: 172.88 LBS | SYSTOLIC BLOOD PRESSURE: 112 MMHG

## 2025-03-07 DIAGNOSIS — Z01.818 VISIT FOR PRE-OPERATIVE EXAMINATION: Primary | ICD-10-CM

## 2025-03-07 DIAGNOSIS — Z87.891 PERSONAL HISTORY OF TOBACCO USE: ICD-10-CM

## 2025-03-07 RX ORDER — HYDROCODONE BITARTRATE AND ACETAMINOPHEN 7.5; 325 MG/1; MG/1
1 TABLET ORAL EVERY 8 HOURS PRN
COMMUNITY
Start: 2025-03-02

## 2025-03-07 RX ORDER — CHLORHEXIDINE GLUCONATE 40 MG/ML
4 SOLUTION TOPICAL ONCE
COMMUNITY
Start: 2025-03-02

## 2025-03-07 NOTE — PROGRESS NOTES
[facility]  Primary Care Clinic - [place], [place]    Family Medicine Office Visit    Subjective     Patient ID: Sharri House is a 64 y.o. female who presents to clinic today for pre-op visit.     Medical history, surgical history, medications, allergies, and social history were reviewed and updated.     Chief Complaint: Pre-op Exam    HPI    Pre-op visit  She is scheduled to complete a left shoulder manipulation under anesthesia with Dep injection with Dr. Combs at [facility] on 3/11/2025.  She reports having a shoulder injection with him recently without improvement.  They did attempt some manipulation of her shoulder which she reported greatly improved her pain, but it was very painful at the time.  They are planning to complete this under anesthesia to reduce symptoms.    Vitals:    03/07/25 1238   BP: 112/62   Pulse: 83      Wt Readings from Last 3 Encounters:   03/07/25 1238 78.4 kg (172 lb 14.4 oz)   01/30/25 1304 83.9 kg (185 lb)   01/28/25 1352 84.4 kg (186 lb)      Review of Systems    Review of Systems otherwise negative unless specified above.        Objective     Physical Exam  Vitals reviewed.   Constitutional:       General: She is not in acute distress.     Appearance: Normal appearance.   HENT:      Head: Normocephalic and atraumatic.      Nose: Nose normal.      Mouth/Throat:      Mouth: Mucous membranes are moist.   Cardiovascular:      Rate and Rhythm: Normal rate and regular rhythm.      Heart sounds: No murmur heard.  Pulmonary:      Effort: Pulmonary effort is normal. No respiratory distress.      Breath sounds: Normal breath sounds.   Musculoskeletal:         General: Normal range of motion.   Skin:     General: Skin is warm and dry.   Neurological:      General: No focal deficit present.      Mental Status: She is alert and oriented to person, place, and time.   Psychiatric:         Mood and Affect: Mood normal.         Behavior: Behavior normal.            Assessment  and Plan     Current Outpatient Medications   Medication Instructions    atorvastatin (LIPITOR) 10 mg, Oral, Daily    chlorhexidine (HIBICLENS) 4 %, Once    DULoxetine (CYMBALTA) 60 mg, Oral, Daily    ergocalciferol (VITAMIN D2) 50,000 Units, Oral, Every 7 days    esomeprazole (NEXIUM) 20 mg, Before breakfast    furosemide (LASIX) 20 mg, Oral, Daily PRN    HYDROcodone-acetaminophen (NORCO) 7.5-325 mg per tablet 1 tablet, Every 8 hours PRN    mupirocin (BACTROBAN) 2 % ointment Topical (Top), 3 times daily    ZEPBOUND 10 mg, Subcutaneous, Every 7 days        1. Visit for pre-operative examination    2. Personal history of tobacco use  -     CT Chest Lung Screening Low Dose; Future; Expected date: 03/07/2025        Completed her preop exam paperwork and we will fax a copy over to her surgeon.  Discussed that she is due for CT lung screen so we will place orders for this as well as orders for her mammogram to be completed in April.  We will otherwise plan to have her follow up with me in 2 months.    Visit today included increased complexity associated with the care of the episodic problem preop visit addressed and managing the longitudinal care of the patient due to the serious and/or complex managed problem(s) preop visit.         Follow up in about 2 months (around 5/7/2025) for Follow up with Nikita.    Questions were invited and answered. No other acute concerns at this time. Will plan to follow up as above or sooner if needed.     Radha Shelton, DO  03/07/2025 12:45 PM       This dictation has been generated using Modal Fluency Dictation. Some phonetic errors may occur. Please contact author for clarification if needed.

## 2025-05-02 DIAGNOSIS — E78.5 DYSLIPIDEMIA: ICD-10-CM

## 2025-05-02 DIAGNOSIS — M79.89 SWELLING OF LOWER EXTREMITY: ICD-10-CM

## 2025-05-02 RX ORDER — FUROSEMIDE 20 MG/1
20 TABLET ORAL DAILY PRN
Qty: 30 TABLET | Refills: 1 | Status: SHIPPED | OUTPATIENT
Start: 2025-05-02 | End: 2026-05-02

## 2025-05-02 RX ORDER — ATORVASTATIN CALCIUM 10 MG/1
10 TABLET, FILM COATED ORAL DAILY
Qty: 90 TABLET | Refills: 3 | Status: SHIPPED | OUTPATIENT
Start: 2025-05-02 | End: 2026-05-02

## 2025-05-02 NOTE — TELEPHONE ENCOUNTER
Care Due:                  Date            Visit Type   Department     Provider  --------------------------------------------------------------------------------                                EP -                              PRIMARY      [facility] FAMILY  Last Visit: 03-      CARE ([facility])   MEDICINE       Radha Shelton  Next Visit: None Scheduled  None         None Found                                                            Last  Test          Frequency    Reason                     Performed    Due Date  --------------------------------------------------------------------------------    Vitamin D...  12 months..  ergocalciferol...........  Not Found    Overdue    [employer] Embedded Care Due Messages. Reference number: [id number].   5/02/2025 10:10:59 AM CDT

## 2025-05-14 ENCOUNTER — HOSPITAL ENCOUNTER (OUTPATIENT)
Dept: RADIOLOGY | Facility: HOSPITAL | Age: 65
Discharge: HOME | End: 2025-05-14
Attending: STUDENT IN AN ORGANIZED HEALTH CARE EDUCATION/TRAINING PROGRAM
Payer: COMMERCIAL

## 2025-05-14 DIAGNOSIS — Z12.31 ENCOUNTER FOR SCREENING MAMMOGRAM FOR MALIGNANT NEOPLASM OF BREAST: ICD-10-CM

## 2025-05-14 PROCEDURE — 77063 BREAST TOMOSYNTHESIS BI: CPT | Mod: 26,,, | Performed by: RADIOLOGY

## 2025-05-14 PROCEDURE — 77063 BREAST TOMOSYNTHESIS BI: CPT | Mod: TC

## 2025-05-14 PROCEDURE — 77067 SCR MAMMO BI INCL CAD: CPT | Mod: 26,,, | Performed by: RADIOLOGY

## 2025-05-15 ENCOUNTER — RESULTS FOLLOW-UP (OUTPATIENT)
Dept: FAMILY MEDICINE | Facility: CLINIC | Age: 65
End: 2025-05-15

## 2025-05-28 ENCOUNTER — HOSPITAL ENCOUNTER (OUTPATIENT)
Dept: RADIOLOGY | Facility: HOSPITAL | Age: 65
Discharge: HOME | End: 2025-05-28
Attending: STUDENT IN AN ORGANIZED HEALTH CARE EDUCATION/TRAINING PROGRAM
Payer: COMMERCIAL

## 2025-05-28 DIAGNOSIS — Z87.891 PERSONAL HISTORY OF TOBACCO USE: ICD-10-CM

## 2025-05-28 PROCEDURE — 71271 CT THORAX LUNG CANCER SCR C-: CPT | Mod: TC

## 2025-05-29 ENCOUNTER — RESULTS FOLLOW-UP (OUTPATIENT)
Dept: FAMILY MEDICINE | Facility: CLINIC | Age: 65
End: 2025-05-29

## 2025-05-29 ENCOUNTER — OFFICE VISIT (OUTPATIENT)
Dept: FAMILY MEDICINE | Facility: CLINIC | Age: 65
End: 2025-05-29
Payer: COMMERCIAL

## 2025-05-29 ENCOUNTER — PATIENT MESSAGE (OUTPATIENT)
Dept: FAMILY MEDICINE | Facility: CLINIC | Age: 65
End: 2025-05-29

## 2025-05-29 VITALS
OXYGEN SATURATION: 99 % | WEIGHT: 181.31 LBS | BODY MASS INDEX: 29.14 KG/M2 | DIASTOLIC BLOOD PRESSURE: 66 MMHG | HEART RATE: 67 BPM | HEIGHT: 66 IN | SYSTOLIC BLOOD PRESSURE: 122 MMHG

## 2025-05-29 DIAGNOSIS — R73.03 PREDIABETES: ICD-10-CM

## 2025-05-29 DIAGNOSIS — F41.1 GAD (GENERALIZED ANXIETY DISORDER): ICD-10-CM

## 2025-05-29 DIAGNOSIS — L03.119 CELLULITIS OF AXILLA, UNSPECIFIED LATERALITY: Primary | ICD-10-CM

## 2025-05-29 DIAGNOSIS — E78.5 DYSLIPIDEMIA: Primary | ICD-10-CM

## 2025-05-29 DIAGNOSIS — E66.3 OVERWEIGHT (BMI 25.0-29.9): ICD-10-CM

## 2025-05-29 DIAGNOSIS — R91.8 OTHER NONSPECIFIC ABNORMAL FINDING OF LUNG FIELD: Primary | ICD-10-CM

## 2025-05-29 DIAGNOSIS — G62.9 NEUROPATHY: ICD-10-CM

## 2025-05-29 PROCEDURE — 80061 LIPID PANEL: CPT | Performed by: STUDENT IN AN ORGANIZED HEALTH CARE EDUCATION/TRAINING PROGRAM

## 2025-05-29 PROCEDURE — 3074F SYST BP LT 130 MM HG: CPT | Mod: CPTII,S$GLB,, | Performed by: STUDENT IN AN ORGANIZED HEALTH CARE EDUCATION/TRAINING PROGRAM

## 2025-05-29 PROCEDURE — 1159F MED LIST DOCD IN RCRD: CPT | Mod: CPTII,S$GLB,, | Performed by: STUDENT IN AN ORGANIZED HEALTH CARE EDUCATION/TRAINING PROGRAM

## 2025-05-29 PROCEDURE — G2211 COMPLEX E/M VISIT ADD ON: HCPCS | Mod: S$GLB,,, | Performed by: STUDENT IN AN ORGANIZED HEALTH CARE EDUCATION/TRAINING PROGRAM

## 2025-05-29 PROCEDURE — 3044F HG A1C LEVEL LT 7.0%: CPT | Mod: CPTII,S$GLB,, | Performed by: STUDENT IN AN ORGANIZED HEALTH CARE EDUCATION/TRAINING PROGRAM

## 2025-05-29 PROCEDURE — 80053 COMPREHEN METABOLIC PANEL: CPT | Performed by: STUDENT IN AN ORGANIZED HEALTH CARE EDUCATION/TRAINING PROGRAM

## 2025-05-29 PROCEDURE — 3078F DIAST BP <80 MM HG: CPT | Mod: CPTII,S$GLB,, | Performed by: STUDENT IN AN ORGANIZED HEALTH CARE EDUCATION/TRAINING PROGRAM

## 2025-05-29 PROCEDURE — 99214 OFFICE O/P EST MOD 30 MIN: CPT | Mod: S$GLB,,, | Performed by: STUDENT IN AN ORGANIZED HEALTH CARE EDUCATION/TRAINING PROGRAM

## 2025-05-29 PROCEDURE — 1160F RVW MEDS BY RX/DR IN RCRD: CPT | Mod: CPTII,S$GLB,, | Performed by: STUDENT IN AN ORGANIZED HEALTH CARE EDUCATION/TRAINING PROGRAM

## 2025-05-29 PROCEDURE — 85025 COMPLETE CBC W/AUTO DIFF WBC: CPT | Performed by: STUDENT IN AN ORGANIZED HEALTH CARE EDUCATION/TRAINING PROGRAM

## 2025-05-29 PROCEDURE — 3008F BODY MASS INDEX DOCD: CPT | Mod: CPTII,S$GLB,, | Performed by: STUDENT IN AN ORGANIZED HEALTH CARE EDUCATION/TRAINING PROGRAM

## 2025-05-29 PROCEDURE — 83036 HEMOGLOBIN GLYCOSYLATED A1C: CPT | Performed by: STUDENT IN AN ORGANIZED HEALTH CARE EDUCATION/TRAINING PROGRAM

## 2025-05-29 RX ORDER — TIRZEPATIDE 5 MG/.5ML
5 INJECTION, SOLUTION SUBCUTANEOUS
Qty: 2 ML | Refills: 2 | Status: SHIPPED | OUTPATIENT
Start: 2025-05-29 | End: 2025-08-27

## 2025-05-29 RX ORDER — DOXYCYCLINE 100 MG/1
100 CAPSULE ORAL 2 TIMES DAILY
Qty: 14 CAPSULE | Refills: 0 | Status: SHIPPED | OUTPATIENT
Start: 2025-05-29 | End: 2025-06-05

## 2025-05-29 RX ORDER — DULOXETIN HYDROCHLORIDE 60 MG/1
60 CAPSULE, DELAYED RELEASE ORAL DAILY
Qty: 90 CAPSULE | Refills: 1 | Status: SHIPPED | OUTPATIENT
Start: 2025-05-29 | End: 2026-05-29

## 2025-05-29 RX ORDER — ATORVASTATIN CALCIUM 10 MG/1
10 TABLET, FILM COATED ORAL DAILY
Qty: 90 TABLET | Refills: 3 | Status: SHIPPED | OUTPATIENT
Start: 2025-05-29 | End: 2026-05-29

## 2025-05-29 NOTE — PROGRESS NOTES
[facility]  Primary Care Clinic - [place], [place]    Family Medicine Office Visit    Subjective     Patient ID: Sharri House is a 64 y.o. female who presents to clinic today to follow up.     Medical history, surgical history, medications, allergies, and social history were reviewed and updated.     Chief Complaint: Health Maintenance    HPI    She stopped her tirzepatide after she bought a house. She is trying to take care of her son. In 2 weeks she has gained 12 lbs. Her feet are now swollen.  She is very upset about this and feels that she is back sliding rapidly.  She reported that she has stopped taking all of her other medications and feels that this has making things worse as well.  She is requesting that we complete labs today and reorder her tirzepatide for her to restart.    She has a history of dyslipidemia.  She has previously taking Lipitor 10 mg daily.  We will restart this today and obtain a lipid panel.    She has a history of prediabetes.  He is not able to tolerate metformin that was previously taking tirzepatide for this and this has been well-controlled.  She is requesting an A1c and for us to repeat this today.    She has a history of neuropathy and was taking Cymbalta for this.  She is also taking the Cymbalta for anxiety.  She is requesting that we restart this to better control her symptoms.    She has a history of obesity, but is now overweight.  At her heaviest, she was 220 lb with a BMI of 35.6.  To his appetite was very effective at controlling this.  She is requesting that we restart it.     Vitals:    05/29/25 0835   BP: 122/66   Pulse: 67      Wt Readings from Last 3 Encounters:   05/29/25 0835 82.2 kg (181 lb 5.3 oz)   03/07/25 1238 78.4 kg (172 lb 14.4 oz)   01/30/25 1304 83.9 kg (185 lb)      Review of Systems    Review of Systems otherwise negative unless specified above.        Objective     Physical Exam  Vitals reviewed.   Constitutional:       General: She is not  in acute distress.     Appearance: Normal appearance.   HENT:      Head: Normocephalic and atraumatic.      Nose: Nose normal.      Mouth/Throat:      Mouth: Mucous membranes are moist.   Cardiovascular:      Rate and Rhythm: Normal rate and regular rhythm.      Heart sounds: No murmur heard.  Pulmonary:      Effort: Pulmonary effort is normal. No respiratory distress.      Breath sounds: Normal breath sounds.   Musculoskeletal:         General: Normal range of motion.   Skin:     General: Skin is warm and dry.   Neurological:      General: No focal deficit present.      Mental Status: She is alert and oriented to person, place, and time.   Psychiatric:         Mood and Affect: Mood normal.         Behavior: Behavior normal.            Assessment and Plan     Current Outpatient Medications   Medication Instructions    atorvastatin (LIPITOR) 10 mg, Oral, Daily    chlorhexidine (HIBICLENS) 4 %, Once    DULoxetine (CYMBALTA) 60 mg, Oral, Daily    ergocalciferol (VITAMIN D2) 50,000 Units, Oral, Every 7 days    esomeprazole (NEXIUM) 20 mg, Before breakfast    furosemide (LASIX) 20 mg, Oral, Daily PRN    HYDROcodone-acetaminophen (NORCO) 7.5-325 mg per tablet 1 tablet, Every 8 hours PRN    mupirocin (BACTROBAN) 2 % ointment Topical (Top), 3 times daily    ZEPBOUND 5 mg, Subcutaneous, Every 7 days        1. Dyslipidemia  -     Lipid Panel; Future  -     atorvastatin (LIPITOR) 10 MG tablet; Take 1 tablet (10 mg total) by mouth once daily.  Dispense: 90 tablet; Refill: 3    2. Prediabetes  -     CBC Auto Differential; Future; Expected date: 05/29/2025  -     Comprehensive Metabolic Panel; Future  -     Hemoglobin A1C; Future  -     tirzepatide, weight loss, (ZEPBOUND) 5 mg/0.5 mL PnIj; Inject 5 mg into the skin every 7 days.  Dispense: 2 mL; Refill: 2    3. Neuropathy  -     DULoxetine (CYMBALTA) 60 MG capsule; Take 1 capsule (60 mg total) by mouth once daily.  Dispense: 90 capsule; Refill: 1    4. BOB (generalized anxiety  disorder)  -     DULoxetine (CYMBALTA) 60 MG capsule; Take 1 capsule (60 mg total) by mouth once daily.  Dispense: 90 capsule; Refill: 1    5. Overweight (BMI 25.0-29.9)  -     tirzepatide, weight loss, (ZEPBOUND) 5 mg/0.5 mL PnIj; Inject 5 mg into the skin every 7 days.  Dispense: 2 mL; Refill: 2        Completing screening labs as above and restarting her medications.  We will otherwise plan to have her follow up in 3 months for monitoring.    Visit today included increased complexity associated with the care of the episodic problem dyslipidemia, prediabetes addressed and managing the longitudinal care of the patient due to the serious and/or complex managed problem(s) dyslipidemia, prediabetes.         Follow up in about 3 months (around 8/29/2025) for Follow up with Nikita.    Questions were invited and answered. No other acute concerns at this time. Will plan to follow up as above or sooner if needed.     Radha Shelton, DO  05/29/2025 9:05 AM       This dictation has been generated using Modal Fluency Dictation. Some phonetic errors may occur. Please contact author for clarification if needed.

## 2025-06-18 ENCOUNTER — PATIENT MESSAGE (OUTPATIENT)
Dept: FAMILY MEDICINE | Facility: CLINIC | Age: 65
End: 2025-06-18
Payer: COMMERCIAL

## 2025-06-18 DIAGNOSIS — E66.3 OVERWEIGHT (BMI 25.0-29.9): Primary | ICD-10-CM

## 2025-06-18 DIAGNOSIS — R73.03 PREDIABETES: ICD-10-CM

## 2025-06-19 RX ORDER — TIRZEPATIDE 7.5 MG/.5ML
7.5 INJECTION, SOLUTION SUBCUTANEOUS
Qty: 2 ML | Refills: 2 | Status: SHIPPED | OUTPATIENT
Start: 2025-06-19 | End: 2025-09-17

## 2025-08-06 ENCOUNTER — PATIENT MESSAGE (OUTPATIENT)
Dept: FAMILY MEDICINE | Facility: CLINIC | Age: 65
End: 2025-08-06
Payer: COMMERCIAL

## 2025-08-06 DIAGNOSIS — L03.119 CELLULITIS OF AXILLA, UNSPECIFIED LATERALITY: Primary | ICD-10-CM

## 2025-08-06 NOTE — TELEPHONE ENCOUNTER
Patient is requesting Doxycycline for her recurrent skin infection.  Do you need to see her first?  Not sure as you have nothing available until September and this sounds like a chronic condition.

## 2025-08-07 RX ORDER — DOXYCYCLINE 100 MG/1
100 CAPSULE ORAL 2 TIMES DAILY
Qty: 14 CAPSULE | Refills: 0 | Status: SHIPPED | OUTPATIENT
Start: 2025-08-07 | End: 2025-08-14

## 2025-08-13 ENCOUNTER — PATIENT MESSAGE (OUTPATIENT)
Dept: FAMILY MEDICINE | Facility: CLINIC | Age: 65
End: 2025-08-13
Payer: COMMERCIAL

## 2025-08-13 DIAGNOSIS — M15.9 OSTEOARTHRITIS OF MULTIPLE JOINTS, UNSPECIFIED OSTEOARTHRITIS TYPE: Primary | ICD-10-CM

## 2025-08-13 DIAGNOSIS — M79.89 SWELLING OF LOWER EXTREMITY: ICD-10-CM

## 2025-08-13 DIAGNOSIS — G62.9 NEUROPATHY: ICD-10-CM

## 2025-08-13 DIAGNOSIS — F41.1 GAD (GENERALIZED ANXIETY DISORDER): ICD-10-CM

## 2025-08-13 RX ORDER — DULOXETIN HYDROCHLORIDE 60 MG/1
60 CAPSULE, DELAYED RELEASE ORAL DAILY
Qty: 90 CAPSULE | Refills: 3 | Status: SHIPPED | OUTPATIENT
Start: 2025-08-13 | End: 2026-08-08

## 2025-08-14 RX ORDER — FUROSEMIDE 20 MG/1
20 TABLET ORAL DAILY PRN
Qty: 90 TABLET | Refills: 3 | Status: SHIPPED | OUTPATIENT
Start: 2025-08-14 | End: 2026-08-14

## 2025-08-14 RX ORDER — IBUPROFEN 400 MG/1
400 TABLET, FILM COATED ORAL EVERY 6 HOURS PRN
Qty: 60 TABLET | Refills: 2 | Status: SHIPPED | OUTPATIENT
Start: 2025-08-14

## 2025-08-22 ENCOUNTER — OFFICE VISIT (OUTPATIENT)
Dept: FAMILY MEDICINE | Facility: CLINIC | Age: 65
End: 2025-08-22
Payer: COMMERCIAL

## 2025-08-22 VITALS
BODY MASS INDEX: 28.37 KG/M2 | HEART RATE: 62 BPM | HEIGHT: 66 IN | OXYGEN SATURATION: 99 % | DIASTOLIC BLOOD PRESSURE: 62 MMHG | WEIGHT: 176.5 LBS | SYSTOLIC BLOOD PRESSURE: 106 MMHG

## 2025-08-22 DIAGNOSIS — R73.03 PREDIABETES: ICD-10-CM

## 2025-08-22 DIAGNOSIS — G72.0 STATIN MYOPATHY: Primary | ICD-10-CM

## 2025-08-22 DIAGNOSIS — T46.6X5A STATIN MYOPATHY: Primary | ICD-10-CM

## 2025-08-22 DIAGNOSIS — E66.3 OVERWEIGHT (BMI 25.0-29.9): ICD-10-CM

## 2025-08-22 RX ORDER — TIRZEPATIDE 7.5 MG/.5ML
7.5 INJECTION, SOLUTION SUBCUTANEOUS
Qty: 2 ML | Refills: 2 | Status: SHIPPED | OUTPATIENT
Start: 2025-08-22 | End: 2025-11-20

## (undated) DEVICE — TUBING INSUFFLATOR W/FILTR 10

## (undated) DEVICE — BINDER AB 10IN W/STAVES XLARGE

## (undated) DEVICE — TROCAR BLDELSS 5X100 STABILITY

## (undated) DEVICE — GLOVE BIOGEL ECLIPSE SZ 7.5

## (undated) DEVICE — DEVICE FIXATION ABSORBATACK 5M

## (undated) DEVICE — GLOVE SENSICARE PI SURG 6.5

## (undated) DEVICE — PAD CURAD NONADH 3X4IN

## (undated) DEVICE — DRAPE ABDOMINAL TIBURON 14X11

## (undated) DEVICE — DRESSING TRANS 2X2 TEGADERM

## (undated) DEVICE — CANNULA ENDOPATH XCEL 5X100MM

## (undated) DEVICE — SHEARS HARMONIC CRVD TIP 36CM

## (undated) DEVICE — DEVICE FIXATION ABSTACK 30

## (undated) DEVICE — CANISTER SUCTION RIGID 3000CC

## (undated) DEVICE — Device

## (undated) DEVICE — TROCAR ENDOPATH XCEL 11MM 10CM

## (undated) DEVICE — DRESSING TRANS 4X4 TEGADERM

## (undated) DEVICE — PENCIL ZIP PEN SMOKE EVAC 10FT

## (undated) DEVICE — SOL CLEARIFY VISUALIZATION LAP

## (undated) DEVICE — GAUZE SPONGE BULKEE 6X6.75IN

## (undated) DEVICE — STAPLER SKIN ROTATING HEAD

## (undated) DEVICE — ELECTRODE REM PLYHSV RETURN 9

## (undated) DEVICE — ENDO GRASPER ETHICON 5DSG

## (undated) DEVICE — TUBING LAP SMOKE EVAC 6.5MM

## (undated) DEVICE — GLOVE SENSICARE PI GRN 7